# Patient Record
Sex: FEMALE | Race: BLACK OR AFRICAN AMERICAN | NOT HISPANIC OR LATINO | Employment: OTHER | URBAN - METROPOLITAN AREA
[De-identification: names, ages, dates, MRNs, and addresses within clinical notes are randomized per-mention and may not be internally consistent; named-entity substitution may affect disease eponyms.]

---

## 2017-09-06 ENCOUNTER — HOSPITAL ENCOUNTER (INPATIENT)
Facility: HOSPITAL | Age: 72
LOS: 12 days | Discharge: NURSING, REHAB OR LONG-TERM CARE | DRG: 097 | End: 2017-09-18
Attending: EMERGENCY MEDICINE | Admitting: INTERNAL MEDICINE
Payer: MEDICARE

## 2017-09-06 DIAGNOSIS — R93.0 ABNORMAL CT OF THE HEAD: ICD-10-CM

## 2017-09-06 DIAGNOSIS — I50.9 CONGESTIVE HEART FAILURE, UNSPECIFIED CONGESTIVE HEART FAILURE CHRONICITY, UNSPECIFIED CONGESTIVE HEART FAILURE TYPE: ICD-10-CM

## 2017-09-06 DIAGNOSIS — R41.82 ALTERED MENTAL STATUS, UNSPECIFIED ALTERED MENTAL STATUS TYPE: Primary | ICD-10-CM

## 2017-09-06 DIAGNOSIS — I50.9 CHF (CONGESTIVE HEART FAILURE): ICD-10-CM

## 2017-09-06 DIAGNOSIS — I10 HTN (HYPERTENSION), MALIGNANT: ICD-10-CM

## 2017-09-06 DIAGNOSIS — G93.40 ACUTE ENCEPHALOPATHY: ICD-10-CM

## 2017-09-06 DIAGNOSIS — N39.0 URINARY TRACT INFECTION WITHOUT HEMATURIA, SITE UNSPECIFIED: ICD-10-CM

## 2017-09-06 DIAGNOSIS — R56.9 SEIZURE: ICD-10-CM

## 2017-09-06 DIAGNOSIS — E11.8 TYPE 2 DIABETES MELLITUS WITH COMPLICATION: ICD-10-CM

## 2017-09-06 DIAGNOSIS — E11.8 TYPE 2 DIABETES MELLITUS WITH COMPLICATION, WITHOUT LONG-TERM CURRENT USE OF INSULIN: ICD-10-CM

## 2017-09-06 DIAGNOSIS — N39.0 URINARY TRACT INFECTION WITHOUT HEMATURIA: ICD-10-CM

## 2017-09-06 LAB
ALBUMIN SERPL BCP-MCNC: 3.4 G/DL
ALP SERPL-CCNC: 67 U/L
ALT SERPL W/O P-5'-P-CCNC: 18 U/L
ANION GAP SERPL CALC-SCNC: 10 MMOL/L
AST SERPL-CCNC: 32 U/L
BASOPHILS # BLD AUTO: 0.02 K/UL
BASOPHILS NFR BLD: 0.3 %
BILIRUB SERPL-MCNC: 0.8 MG/DL
BUN SERPL-MCNC: 14 MG/DL
CALCIUM SERPL-MCNC: 9.5 MG/DL
CHLORIDE SERPL-SCNC: 101 MMOL/L
CO2 SERPL-SCNC: 28 MMOL/L
CREAT SERPL-MCNC: 0.8 MG/DL
DIFFERENTIAL METHOD: NORMAL
EOSINOPHIL # BLD AUTO: 0 K/UL
EOSINOPHIL NFR BLD: 0.5 %
ERYTHROCYTE [DISTWIDTH] IN BLOOD BY AUTOMATED COUNT: 14.1 %
EST. GFR  (AFRICAN AMERICAN): >60 ML/MIN/1.73 M^2
EST. GFR  (NON AFRICAN AMERICAN): >60 ML/MIN/1.73 M^2
GLUCOSE SERPL-MCNC: 113 MG/DL
HCT VFR BLD AUTO: 38.5 %
HGB BLD-MCNC: 12.8 G/DL
INR PPP: 1.1
LYMPHOCYTES # BLD AUTO: 1.7 K/UL
LYMPHOCYTES NFR BLD: 23.9 %
MCH RBC QN AUTO: 28.8 PG
MCHC RBC AUTO-ENTMCNC: 33.2 G/DL
MCV RBC AUTO: 87 FL
MONOCYTES # BLD AUTO: 1 K/UL
MONOCYTES NFR BLD: 13 %
NEUTROPHILS # BLD AUTO: 4.5 K/UL
NEUTROPHILS NFR BLD: 62 %
PLATELET # BLD AUTO: 163 K/UL
PMV BLD AUTO: 10.6 FL
POTASSIUM SERPL-SCNC: 3.9 MMOL/L
PROT SERPL-MCNC: 7.9 G/DL
PROTHROMBIN TIME: 12 SEC
RBC # BLD AUTO: 4.45 M/UL
SODIUM SERPL-SCNC: 139 MMOL/L
TROPONIN I SERPL DL<=0.01 NG/ML-MCNC: 0.04 NG/ML
TSH SERPL DL<=0.005 MIU/L-ACNC: 2.34 UIU/ML
WBC # BLD AUTO: 7.29 K/UL

## 2017-09-06 PROCEDURE — 96368 THER/DIAG CONCURRENT INF: CPT | Mod: 59

## 2017-09-06 PROCEDURE — 96365 THER/PROPH/DIAG IV INF INIT: CPT

## 2017-09-06 PROCEDURE — 84443 ASSAY THYROID STIM HORMONE: CPT

## 2017-09-06 PROCEDURE — 96372 THER/PROPH/DIAG INJ SC/IM: CPT

## 2017-09-06 PROCEDURE — 80053 COMPREHEN METABOLIC PANEL: CPT

## 2017-09-06 PROCEDURE — 87040 BLOOD CULTURE FOR BACTERIA: CPT

## 2017-09-06 PROCEDURE — 63600175 PHARM REV CODE 636 W HCPCS: Performed by: STUDENT IN AN ORGANIZED HEALTH CARE EDUCATION/TRAINING PROGRAM

## 2017-09-06 PROCEDURE — 85610 PROTHROMBIN TIME: CPT

## 2017-09-06 PROCEDURE — 12000002 HC ACUTE/MED SURGE SEMI-PRIVATE ROOM

## 2017-09-06 PROCEDURE — 99285 EMERGENCY DEPT VISIT HI MDM: CPT | Mod: 25

## 2017-09-06 PROCEDURE — 63600175 PHARM REV CODE 636 W HCPCS: Performed by: INTERNAL MEDICINE

## 2017-09-06 PROCEDURE — 99285 EMERGENCY DEPT VISIT HI MDM: CPT | Mod: ,,, | Performed by: EMERGENCY MEDICINE

## 2017-09-06 PROCEDURE — 84484 ASSAY OF TROPONIN QUANT: CPT

## 2017-09-06 PROCEDURE — 99223 1ST HOSP IP/OBS HIGH 75: CPT | Mod: ,,, | Performed by: INTERNAL MEDICINE

## 2017-09-06 PROCEDURE — 85025 COMPLETE CBC W/AUTO DIFF WBC: CPT

## 2017-09-06 PROCEDURE — 93010 ELECTROCARDIOGRAM REPORT: CPT | Mod: ,,, | Performed by: INTERNAL MEDICINE

## 2017-09-06 RX ORDER — AMOXICILLIN 250 MG
1 CAPSULE ORAL 2 TIMES DAILY
Status: DISCONTINUED | OUTPATIENT
Start: 2017-09-06 | End: 2017-09-06

## 2017-09-06 RX ORDER — FUROSEMIDE 80 MG/1
80 TABLET ORAL 2 TIMES DAILY
Status: ON HOLD | COMMUNITY
End: 2017-09-25

## 2017-09-06 RX ORDER — ONDANSETRON 2 MG/ML
4 INJECTION INTRAMUSCULAR; INTRAVENOUS EVERY 6 HOURS PRN
Status: DISCONTINUED | OUTPATIENT
Start: 2017-09-07 | End: 2017-09-18 | Stop reason: HOSPADM

## 2017-09-06 RX ORDER — ALLOPURINOL 100 MG/1
100 TABLET ORAL 3 TIMES DAILY
COMMUNITY

## 2017-09-06 RX ORDER — IPRATROPIUM BROMIDE AND ALBUTEROL SULFATE 2.5; .5 MG/3ML; MG/3ML
3 SOLUTION RESPIRATORY (INHALATION) EVERY 4 HOURS PRN
Status: DISCONTINUED | OUTPATIENT
Start: 2017-09-06 | End: 2017-09-18 | Stop reason: HOSPADM

## 2017-09-06 RX ORDER — POLYETHYLENE GLYCOL 3350 17 G/17G
17 POWDER, FOR SOLUTION ORAL 2 TIMES DAILY PRN
Status: DISCONTINUED | OUTPATIENT
Start: 2017-09-06 | End: 2017-09-06

## 2017-09-06 RX ORDER — CARVEDILOL 3.12 MG/1
6.25 TABLET ORAL 2 TIMES DAILY
Status: DISCONTINUED | OUTPATIENT
Start: 2017-09-06 | End: 2017-09-07

## 2017-09-06 RX ORDER — ALLOPURINOL 100 MG/1
100 TABLET ORAL 3 TIMES DAILY
Status: DISCONTINUED | OUTPATIENT
Start: 2017-09-06 | End: 2017-09-06

## 2017-09-06 RX ORDER — ALBUTEROL SULFATE 90 UG/1
2 AEROSOL, METERED RESPIRATORY (INHALATION) EVERY 6 HOURS PRN
COMMUNITY

## 2017-09-06 RX ORDER — POTASSIUM CHLORIDE 750 MG/1
10 TABLET, EXTENDED RELEASE ORAL DAILY
COMMUNITY

## 2017-09-06 RX ORDER — OMEPRAZOLE 20 MG/1
20 CAPSULE, DELAYED RELEASE ORAL DAILY
COMMUNITY

## 2017-09-06 RX ORDER — BACLOFEN 10 MG/1
10 TABLET ORAL 3 TIMES DAILY
COMMUNITY

## 2017-09-06 RX ORDER — ONDANSETRON 8 MG/1
8 TABLET, ORALLY DISINTEGRATING ORAL EVERY 8 HOURS PRN
Status: DISCONTINUED | OUTPATIENT
Start: 2017-09-06 | End: 2017-09-06

## 2017-09-06 RX ORDER — ENOXAPARIN SODIUM 100 MG/ML
40 INJECTION SUBCUTANEOUS EVERY 24 HOURS
Status: DISCONTINUED | OUTPATIENT
Start: 2017-09-06 | End: 2017-09-18 | Stop reason: HOSPADM

## 2017-09-06 RX ORDER — OXYCODONE HYDROCHLORIDE 5 MG/1
5 TABLET ORAL EVERY 6 HOURS PRN
Status: DISCONTINUED | OUTPATIENT
Start: 2017-09-06 | End: 2017-09-06

## 2017-09-06 RX ORDER — MIDAZOLAM HYDROCHLORIDE 1 MG/ML
1 INJECTION INTRAMUSCULAR; INTRAVENOUS EVERY 4 HOURS PRN
Status: DISCONTINUED | OUTPATIENT
Start: 2017-09-07 | End: 2017-09-18 | Stop reason: HOSPADM

## 2017-09-06 RX ORDER — BACLOFEN 10 MG/1
10 TABLET ORAL 3 TIMES DAILY
Status: DISCONTINUED | OUTPATIENT
Start: 2017-09-06 | End: 2017-09-06

## 2017-09-06 RX ORDER — PANTOPRAZOLE SODIUM 40 MG/1
40 TABLET, DELAYED RELEASE ORAL DAILY
Status: DISCONTINUED | OUTPATIENT
Start: 2017-09-07 | End: 2017-09-06

## 2017-09-06 RX ORDER — LOSARTAN POTASSIUM 100 MG/1
100 TABLET ORAL DAILY
COMMUNITY

## 2017-09-06 RX ORDER — PROMETHAZINE HYDROCHLORIDE 12.5 MG/1
12.5 TABLET ORAL EVERY 6 HOURS PRN
Status: DISCONTINUED | OUTPATIENT
Start: 2017-09-06 | End: 2017-09-06

## 2017-09-06 RX ORDER — ACETAMINOPHEN 500 MG
500 TABLET ORAL EVERY 6 HOURS PRN
Status: DISCONTINUED | OUTPATIENT
Start: 2017-09-06 | End: 2017-09-06

## 2017-09-06 RX ORDER — LEVOTHYROXINE SODIUM 100 UG/1
100 TABLET ORAL DAILY
Status: ON HOLD | COMMUNITY
End: 2017-09-25

## 2017-09-06 RX ORDER — CARVEDILOL PHOSPHATE 20 MG/1
20 CAPSULE, EXTENDED RELEASE ORAL 2 TIMES DAILY
Status: ON HOLD | COMMUNITY
End: 2017-09-25 | Stop reason: HOSPADM

## 2017-09-06 RX ORDER — GLIPIZIDE 5 MG/1
5 TABLET, FILM COATED, EXTENDED RELEASE ORAL
COMMUNITY

## 2017-09-06 RX ORDER — FUROSEMIDE 40 MG/1
80 TABLET ORAL 2 TIMES DAILY
Status: DISCONTINUED | OUTPATIENT
Start: 2017-09-06 | End: 2017-09-06

## 2017-09-06 RX ORDER — LEVOTHYROXINE SODIUM 50 UG/1
100 TABLET ORAL DAILY
Status: DISCONTINUED | OUTPATIENT
Start: 2017-09-07 | End: 2017-09-06

## 2017-09-06 RX ORDER — POTASSIUM CHLORIDE 750 MG/1
10 CAPSULE, EXTENDED RELEASE ORAL DAILY
Status: DISCONTINUED | OUTPATIENT
Start: 2017-09-07 | End: 2017-09-18 | Stop reason: HOSPADM

## 2017-09-06 RX ORDER — LOSARTAN POTASSIUM 50 MG/1
100 TABLET ORAL DAILY
Status: DISCONTINUED | OUTPATIENT
Start: 2017-09-07 | End: 2017-09-06

## 2017-09-06 RX ADMIN — CEFTRIAXONE 1 G: 1 INJECTION, SOLUTION INTRAVENOUS at 07:09

## 2017-09-06 RX ADMIN — ENOXAPARIN SODIUM 40 MG: 100 INJECTION SUBCUTANEOUS at 10:09

## 2017-09-06 NOTE — ED TRIAGE NOTES
Pt presents from [facility] in [facility] with a diagnosis of altered mental status and new onset seizures. Pt was admitted on 9/4/17 with AMS and then developed seizure activity while hospitalized. Pt had approximately - seizures today. No seizures were witnessed during transport. Pt was also recently diagnosed with a UTI.

## 2017-09-06 NOTE — ED PROVIDER NOTES
Encounter Date: 9/6/2017    SCRIBE #1 NOTE: I, Sheila Saleh, am scribing for, and in the presence of,  Dr. Galeana. I have scribed the following portions of the note - the Resident attestation.       History     Chief Complaint   Patient presents with    Altered Mental Status     The patient is 70 y/o F with DM, HTN, GERD, gout, asthma, who was transferred to [facility] from [facility] with new-onset seizures while being hospitalized for AMS.  She presented to the [facility] on 9/4 with a new-onset AMS, in the work up that was done for her, a UTI and subacute R parietal ischemia were found. She was started on IV Rocephin and another brain CT and MRI were repeated, each time with poor quality 2/2 movement artifact. While in the hospital (this morning) she developed focal seizures x2, and was started on IV keppra and transferred to us based on family's demands. Lab results from [facility] did not show any electrolyte abnormality or leukocytosis. INR was 1.1.            Review of patient's allergies indicates:  No Known Allergies  Past Medical History:   Diagnosis Date    CVA (cerebral vascular accident)     Diabetes     GERD (gastroesophageal reflux disease)     Gout     Hypertension      No past surgical history on file.  No family history on file.  Social History   Substance Use Topics    Smoking status: Not on file    Smokeless tobacco: Not on file    Alcohol use Not on file     Review of Systems   Unable to perform ROS: Mental status change       Physical Exam     Initial Vitals   BP Pulse Resp Temp SpO2   -- -- -- -- --      MAP       --         Physical Exam    Vitals reviewed.  Constitutional: She appears lethargic. She is Obese . She is uncooperative. No distress.   HENT:   Head: Normocephalic and atraumatic.   Eyes: EOM are normal. Pupils are equal, round, and reactive to light.   Neck: Normal range of motion.   Cardiovascular: Normal rate, regular rhythm and normal  heart sounds.   No murmur heard.  Pulses:       Radial pulses are 2+ on the right side, and 2+ on the left side.        Dorsalis pedis pulses are 2+ on the right side, and 2+ on the left side.   Pulmonary/Chest: Breath sounds normal. No respiratory distress. She has no wheezes. She has no rales.   Respiratory and cardiovascular is not optimal 2/2 body habitus and uncooperativeness   Abdominal: Soft. There is no tenderness.   Musculoskeletal: She exhibits tenderness (on BL hips).   TTP on BL hips, pain on passive leg raise   Neurological: She appears lethargic. GCS eye subscore is 4. GCS verbal subscore is 3. GCS motor subscore is 6.   The patient is awake, not oriented to time, place, person, acknowledges my presence and questions, responds with incomprehensible words or by repeating yes. She only follows one-step commands. She is uncooperative with the exam, therefore sensation and strength is not assessable, but seems to have weakness in extremities, lower>upper extremities since she is not actively moving them.          ED Course   Procedures  Labs Reviewed   COMPREHENSIVE METABOLIC PANEL - Abnormal; Notable for the following:        Result Value    Glucose 113 (*)     Albumin 3.4 (*)     All other components within normal limits    Narrative:     ADD-ON PT-INR #[id number] PER DENILSON ACOSTA MD 17:43  17:43    09/06/2017 09/06/2017    TROPONIN I - Abnormal; Notable for the following:     Troponin I 0.043 (*)     All other components within normal limits    Narrative:     ADD-ON PT-INR #[id number] PER DENILSON ACOSTA MD 17:43  17:43    09/06/2017 09/06/2017    CULTURE, BLOOD   CULTURE, BLOOD   CBC W/ AUTO DIFFERENTIAL   TSH   PROTIME-INR   PROTIME-INR    Narrative:     ADD-ON PT-INR #[id number] PER DENILSON ACOSTA MD 17:43  17:43    09/06/2017 09/06/2017    URINALYSIS, REFLEX TO URINE CULTURE   TSH             Medical Decision Making:   History:   Old Medical Records: I decided to obtain old medical  records.  Initial Assessment:   The patient is 70 y/o F with DM, HTN, GERD, gout, asthma, who was transferred to [facility] from [facility] with new-onset focal seizures on a base of recent onset AMS probably 2/2 R parietal lobe ischemia and UTI. She has received IV keppra and Rocephin. On exam she is afebrile, lethargic with GCS of 12-13/15. Will obtain EKG, CXR, CBC, electrolytes, and a brain CT scan WO, and re-evaluate after receiving the results.  Clinical Tests:   Lab Tests: Ordered and Reviewed  Radiological Study: Ordered and Reviewed  Medical Tests: Ordered and Reviewed  Other:   I have discussed this case with another health care provider.       APC / Resident Notes:   6:00 PM  The patient is stable  First set of vital signs:  /62  HR 66  satting at 99%     6:26 PM  Troponin is 0.043  CBC and electrolytes are unremarkable  6:49 PM  CXR reveals cardiomegaly and increased interstitial markings suggestive of pulmonary edema     Eliz Sahni MD  PGY-1, Internal Medicine  09/06/2017             Scribe Attestation:   Scribe #1: I performed the above scribed service and the documentation accurately describes the services I performed. I attest to the accuracy of the note.    Attending Attestation:   Physician Attestation Statement for Resident:  As the supervising MD   Physician Attestation Statement: I have personally seen and examined this patient.   I agree with the above history. -: 5:49 PM  72 yo woman hx DM, HTN, asthma, and obesity presents as transfer from outside facility  for AMS. Previous presentation on 9/4 with AMS found to have UTI as well as possible stroke. CT head and MRI revealed subacute right parietal infract, limited secondary to motion artefact. Given rocephin for UTI. This morning had two sz started on keppra at that point. Family concerned over patient's care and recommend transfer to [facility]. Pt arrives to ED with paper charts and no family. Hx limited secondary to  patient's clinical condition.   As the supervising MD I agree with the above PE.   -: Pt is awake and alert, oriented x 0, follows simple commands, limited vocabulary. Abdomen: soft and non tender, bowel sounds present. Cardio: normal heart sounds. Lungs: appropriate respiratory effort, lung sounds diminished secondary to limited body habitus. Neuro: RUE weakness, BLE weakness Eyes: Pupils 3 mm bilaterally and reactive.   As the supervising MD I agree with the above treatment, course, plan, and disposition.   -: 70 yo woman with AMS which I believe is multi factorial, likely subacute stroke as well as UTI. Brief review of records from outside hospital recommend no acute emergent abnormalities. We will continue her treatment with Rocephin, obtain cultures and imaging, and admit to medicine.   I have reviewed and agree with the residents interpretation of the following: EKG, CT scans, x-rays and lab data.  I have reviewed the following: old records at this facility, EKG reports, x-ray reports and CT reports.          Physician Attestation for Scribe:  Physician Attestation Statement for Scribe #1: I, Dr. Galeana, reviewed documentation, as scribed by Sheila Saleh in my presence, and it is both accurate and complete.                 ED Course      Clinical Impression:   The primary encounter diagnosis was Altered mental status, unspecified altered mental status type. A diagnosis of Urinary tract infection without hematuria, site unspecified was also pertinent to this visit.                           Navjot Galeana MD  09/06/17 2028

## 2017-09-06 NOTE — ED NOTES
Patient unable to tell me her name or . Patient is very confused, but still able to follow simple commands. Report on this patient is that she was DX with a UTI and a CVA recently, and she was transferred for further evaluation. Patient in NAD.

## 2017-09-06 NOTE — ED NOTES
Patient is resting on stretcher with call bell within reach, bed locked in the low position, and side rails up x2 for safety. Patient is in NAD. RR spontaneous, even, and unlabored. Will continue to monitor.

## 2017-09-06 NOTE — ED NOTES
Appearance: Patient resting comfortably and in no acute distress.   Cardiac: Heart sounds audible and without abnormalities noted. Patient has a normal rate and regular rhythm.  Neuro/LOC: Patient disoriented x4. Unable to even state name/, location, or time.  Respiratory: Breath sounds audible, equal and clear bilaterally. Airway is open and patent, respirations are spontaneous, patient has a normal effort and rate, no accessory muscle use noted.  Skin: Intact, warm and dry. Color is consistent with ethnicity. Normal skin turgor and moist mucous membranes.  Gastro: Bowel sounds hypoactive, but audible x4 quadrants. No tenderness and no distention are present.  Musculoskeletal: Patient moving all extremities spontaneously    -Patient identifiers verified and correct for this patient.  -Patient resting with bedrails up x2 for safety, bed locked in low position, and call bell within reach.

## 2017-09-07 PROBLEM — E11.8 TYPE 2 DIABETES MELLITUS WITH COMPLICATION: Status: ACTIVE | Noted: 2017-09-07

## 2017-09-07 PROBLEM — G93.40 ACUTE ENCEPHALOPATHY: Status: ACTIVE | Noted: 2017-09-07

## 2017-09-07 PROBLEM — R93.0 ABNORMAL CT OF THE HEAD: Status: ACTIVE | Noted: 2017-09-07

## 2017-09-07 PROBLEM — R56.9 SEIZURE: Status: ACTIVE | Noted: 2017-09-07

## 2017-09-07 PROBLEM — I10 HTN (HYPERTENSION), MALIGNANT: Status: ACTIVE | Noted: 2017-09-07

## 2017-09-07 LAB
AMPHET+METHAMPHET UR QL: NEGATIVE
AORTIC VALVE REGURGITATION: ABNORMAL
BACTERIA #/AREA URNS AUTO: ABNORMAL /HPF
BARBITURATES UR QL SCN>200 NG/ML: NEGATIVE
BENZODIAZ UR QL SCN>200 NG/ML: NORMAL
BILIRUB UR QL STRIP: NEGATIVE
BZE UR QL SCN: NEGATIVE
CANNABINOIDS UR QL SCN: NEGATIVE
CLARITY UR REFRACT.AUTO: CLEAR
COLOR UR AUTO: YELLOW
CREAT UR-MCNC: 129 MG/DL
CRP SERPL-MCNC: 43.6 MG/L
ERYTHROCYTE [SEDIMENTATION RATE] IN BLOOD BY WESTERGREN METHOD: 39 MM/HR
ESTIMATED PA SYSTOLIC PRESSURE: 26.63
ETHANOL UR-MCNC: <10 MG/DL
GLUCOSE UR QL STRIP: NEGATIVE
HGB UR QL STRIP: ABNORMAL
KETONES UR QL STRIP: ABNORMAL
LACTATE SERPL-SCNC: 1.2 MMOL/L
LEUKOCYTE ESTERASE UR QL STRIP: ABNORMAL
METHADONE UR QL SCN>300 NG/ML: NEGATIVE
MICROSCOPIC COMMENT: ABNORMAL
NITRITE UR QL STRIP: NEGATIVE
OPIATES UR QL SCN: NEGATIVE
PCP UR QL SCN>25 NG/ML: NEGATIVE
PH UR STRIP: 5 [PH] (ref 5–8)
POCT GLUCOSE: 127 MG/DL (ref 70–110)
POCT GLUCOSE: 95 MG/DL (ref 70–110)
PROT UR QL STRIP: NEGATIVE
RBC #/AREA URNS AUTO: 1 /HPF (ref 0–4)
RETIRED EF AND QEF - SEE NOTES: 55 (ref 55–65)
SP GR UR STRIP: 1.01 (ref 1–1.03)
SQUAMOUS #/AREA URNS AUTO: 0 /HPF
TOXICOLOGY INFORMATION: NORMAL
URN SPEC COLLECT METH UR: ABNORMAL
UROBILINOGEN UR STRIP-ACNC: NEGATIVE EU/DL
VIT B12 SERPL-MCNC: >2000 PG/ML
WBC #/AREA URNS AUTO: 8 /HPF (ref 0–5)
YEAST UR QL AUTO: ABNORMAL

## 2017-09-07 PROCEDURE — 95813 EEG EXTND MNTR 61-119 MIN: CPT | Mod: 26,,, | Performed by: PSYCHIATRY & NEUROLOGY

## 2017-09-07 PROCEDURE — 25000003 PHARM REV CODE 250: Performed by: INTERNAL MEDICINE

## 2017-09-07 PROCEDURE — 96368 THER/DIAG CONCURRENT INF: CPT | Mod: 59

## 2017-09-07 PROCEDURE — 95813 EEG EXTND MNTR 61-119 MIN: CPT

## 2017-09-07 PROCEDURE — 25000003 PHARM REV CODE 250: Performed by: PHYSICIAN ASSISTANT

## 2017-09-07 PROCEDURE — 82607 VITAMIN B-12: CPT

## 2017-09-07 PROCEDURE — 93306 TTE W/DOPPLER COMPLETE: CPT

## 2017-09-07 PROCEDURE — 11000001 HC ACUTE MED/SURG PRIVATE ROOM

## 2017-09-07 PROCEDURE — 80307 DRUG TEST PRSMV CHEM ANLYZR: CPT

## 2017-09-07 PROCEDURE — 63600175 PHARM REV CODE 636 W HCPCS: Performed by: INTERNAL MEDICINE

## 2017-09-07 PROCEDURE — 84207 ASSAY OF VITAMIN B-6: CPT

## 2017-09-07 PROCEDURE — 99233 SBSQ HOSP IP/OBS HIGH 50: CPT | Mod: ,,, | Performed by: HOSPITALIST

## 2017-09-07 PROCEDURE — 93306 TTE W/DOPPLER COMPLETE: CPT | Mod: 26,,, | Performed by: INTERNAL MEDICINE

## 2017-09-07 PROCEDURE — 96375 TX/PRO/DX INJ NEW DRUG ADDON: CPT | Mod: 59

## 2017-09-07 PROCEDURE — 85651 RBC SED RATE NONAUTOMATED: CPT

## 2017-09-07 PROCEDURE — 96376 TX/PRO/DX INJ SAME DRUG ADON: CPT | Mod: 59

## 2017-09-07 PROCEDURE — 86255 FLUORESCENT ANTIBODY SCREEN: CPT | Mod: 59

## 2017-09-07 PROCEDURE — 92610 EVALUATE SWALLOWING FUNCTION: CPT

## 2017-09-07 PROCEDURE — 84425 ASSAY OF VITAMIN B-1: CPT

## 2017-09-07 PROCEDURE — 51702 INSERT TEMP BLADDER CATH: CPT

## 2017-09-07 PROCEDURE — 86140 C-REACTIVE PROTEIN: CPT

## 2017-09-07 PROCEDURE — 83605 ASSAY OF LACTIC ACID: CPT

## 2017-09-07 PROCEDURE — 81001 URINALYSIS AUTO W/SCOPE: CPT

## 2017-09-07 PROCEDURE — 96365 THER/PROPH/DIAG IV INF INIT: CPT

## 2017-09-07 PROCEDURE — 84484 ASSAY OF TROPONIN QUANT: CPT

## 2017-09-07 PROCEDURE — 99223 1ST HOSP IP/OBS HIGH 75: CPT | Mod: ,,, | Performed by: PSYCHIATRY & NEUROLOGY

## 2017-09-07 PROCEDURE — 63600175 PHARM REV CODE 636 W HCPCS: Performed by: PHYSICIAN ASSISTANT

## 2017-09-07 PROCEDURE — 93005 ELECTROCARDIOGRAM TRACING: CPT

## 2017-09-07 PROCEDURE — 82747 ASSAY OF FOLIC ACID RBC: CPT

## 2017-09-07 PROCEDURE — 36415 COLL VENOUS BLD VENIPUNCTURE: CPT

## 2017-09-07 RX ORDER — METOPROLOL TARTRATE 1 MG/ML
5 INJECTION, SOLUTION INTRAVENOUS EVERY 6 HOURS
Status: COMPLETED | OUTPATIENT
Start: 2017-09-07 | End: 2017-09-07

## 2017-09-07 RX ORDER — MIDAZOLAM HYDROCHLORIDE 1 MG/ML
1 INJECTION INTRAMUSCULAR; INTRAVENOUS ONCE
Status: DISCONTINUED | OUTPATIENT
Start: 2017-09-07 | End: 2017-09-18 | Stop reason: HOSPADM

## 2017-09-07 RX ORDER — MIDAZOLAM HYDROCHLORIDE 1 MG/ML
1 INJECTION INTRAMUSCULAR; INTRAVENOUS ONCE
Status: COMPLETED | OUTPATIENT
Start: 2017-09-07 | End: 2017-09-07

## 2017-09-07 RX ORDER — SODIUM CHLORIDE 450 MG/100ML
INJECTION, SOLUTION INTRAVENOUS CONTINUOUS
Status: DISCONTINUED | OUTPATIENT
Start: 2017-09-07 | End: 2017-09-08

## 2017-09-07 RX ORDER — LORAZEPAM 2 MG/ML
2 INJECTION INTRAMUSCULAR EVERY 4 HOURS PRN
Status: DISCONTINUED | OUTPATIENT
Start: 2017-09-07 | End: 2017-09-18 | Stop reason: HOSPADM

## 2017-09-07 RX ORDER — ASPIRIN 300 MG/1
300 SUPPOSITORY RECTAL DAILY
Status: DISCONTINUED | OUTPATIENT
Start: 2017-09-07 | End: 2017-09-09

## 2017-09-07 RX ORDER — GLUCAGON 1 MG
1 KIT INJECTION
Status: DISCONTINUED | OUTPATIENT
Start: 2017-09-07 | End: 2017-09-18 | Stop reason: HOSPADM

## 2017-09-07 RX ORDER — INSULIN ASPART 100 [IU]/ML
0-5 INJECTION, SOLUTION INTRAVENOUS; SUBCUTANEOUS EVERY 6 HOURS PRN
Status: DISCONTINUED | OUTPATIENT
Start: 2017-09-07 | End: 2017-09-18 | Stop reason: HOSPADM

## 2017-09-07 RX ORDER — LEVETIRACETAM 5 MG/ML
500 INJECTION INTRAVASCULAR EVERY 12 HOURS
Status: DISCONTINUED | OUTPATIENT
Start: 2017-09-07 | End: 2017-09-10

## 2017-09-07 RX ORDER — LEVOTHYROXINE SODIUM ANHYDROUS 100 UG/5ML
50 INJECTION, POWDER, LYOPHILIZED, FOR SOLUTION INTRAVENOUS DAILY
Status: DISCONTINUED | OUTPATIENT
Start: 2017-09-07 | End: 2017-09-09

## 2017-09-07 RX ADMIN — ACYCLOVIR SODIUM 1500 MG: 50 INJECTION, SOLUTION INTRAVENOUS at 02:09

## 2017-09-07 RX ADMIN — ACYCLOVIR SODIUM 1500 MG: 50 INJECTION, SOLUTION INTRAVENOUS at 09:09

## 2017-09-07 RX ADMIN — CEFTRIAXONE 2 G: 2 INJECTION, SOLUTION INTRAVENOUS at 12:09

## 2017-09-07 RX ADMIN — ASPIRIN 300 MG: 300 SUPPOSITORY RECTAL at 08:09

## 2017-09-07 RX ADMIN — MIDAZOLAM HYDROCHLORIDE 1 MG: 1 INJECTION, SOLUTION INTRAMUSCULAR; INTRAVENOUS at 01:09

## 2017-09-07 RX ADMIN — CEFTRIAXONE 2 G: 2 INJECTION, SOLUTION INTRAVENOUS at 06:09

## 2017-09-07 RX ADMIN — METOPROLOL TARTRATE 5 MG: 5 INJECTION INTRAVENOUS at 06:09

## 2017-09-07 RX ADMIN — LEVETIRACETAM 500 MG: 5 INJECTION INTRAVENOUS at 02:09

## 2017-09-07 RX ADMIN — ENOXAPARIN SODIUM 40 MG: 100 INJECTION SUBCUTANEOUS at 05:09

## 2017-09-07 RX ADMIN — LEVOTHYROXINE SODIUM ANHYDROUS 50 MCG: 100 INJECTION, POWDER, LYOPHILIZED, FOR SOLUTION INTRAVENOUS at 08:09

## 2017-09-07 RX ADMIN — POTASSIUM CHLORIDE 10 MEQ: 750 CAPSULE, EXTENDED RELEASE ORAL at 08:09

## 2017-09-07 RX ADMIN — METOPROLOL TARTRATE 5 MG: 5 INJECTION INTRAVENOUS at 05:09

## 2017-09-07 RX ADMIN — METOPROLOL TARTRATE 5 MG: 5 INJECTION INTRAVENOUS at 12:09

## 2017-09-07 RX ADMIN — LEVETIRACETAM 500 MG: 5 INJECTION INTRAVENOUS at 08:09

## 2017-09-07 RX ADMIN — LEVETIRACETAM 500 MG: 5 INJECTION INTRAVENOUS at 09:09

## 2017-09-07 RX ADMIN — ACYCLOVIR SODIUM 1500 MG: 50 INJECTION, SOLUTION INTRAVENOUS at 08:09

## 2017-09-07 RX ADMIN — MIDAZOLAM HYDROCHLORIDE 1 MG: 1 INJECTION, SOLUTION INTRAMUSCULAR; INTRAVENOUS at 12:09

## 2017-09-07 RX ADMIN — SODIUM CHLORIDE: 0.45 INJECTION, SOLUTION INTRAVENOUS at 03:09

## 2017-09-07 NOTE — PT/OT/SLP EVAL
"Speech Language Pathology  Evaluation  Clinical Evaluation of Swallow      Blue Cassidy   MRN: [id number]   Admitting Diagnosis: AMS    Diet recommendations: Solid Diet Level: Dental Soft  Liquid Diet Level: Thin Feed only when awake/alert, HOB to 90 degrees and Meds whole 1 at a time    SLP Treatment Date: 09/07/17  Speech Start Time: 0947     Speech Stop Time: 1001     Speech Total (min): 14 min       TREATMENT BILLABLE MINUTES:  Eval Swallow and Oral Function 14    Diagnosis: AMS      Past Medical History:   Diagnosis Date    CHF (congestive heart failure)     COPD (chronic obstructive pulmonary disease)     CVA (cerebral vascular accident)     Diabetes     GERD (gastroesophageal reflux disease)     Gout     Hypertension     Thyroid disease     UTI (urinary tract infection)      Past Surgical History:   Procedure Laterality Date    HYSTERECTOMY      JOINT REPLACEMENT      right knee    FL REMOVAL GALLBLADDER      Cholecystectomy    THYROID SURGERY         Has the patient been evaluated by SLP for swallowing? : Yes  Keep patient NPO?: No   General Precautions: Standard, aspiration, fall, NPO    Current Respiratory Status: nasal cannula     Social Hx: Patient lives with her daughter.    Prior diet: regular.    Occupational/hobbies/homemaking: none stated.    Communicated w/ RN prior to and following evaluation.    Subjective:  "Come on."  Pt stated when asked if she was agreeable to po trials  Patient goals: to eat and drink.    Pain/Comfort  Pain Rating 1: 0/10  Pain Rating Post-Intervention 1: 0/10    Objective:   Patient found with: peripheral IV, holley catheter, oxygen    Oral Musculature Evaluation  Oral Musculature: unable to assess due to poor participation/comprehension  Dentition: edentulous  Mucosal Quality: adequate  Lingual Strength and Mobility: WFL  Buccal Strength and Mobility: WFL  Volitional Cough: adequate  Volitional Swallow: timely  Voice Prior to PO Intake: clear     Bedside " Swallow Eval:  Consistencies Assessed: Thin liquids single ice chips x2, tsp sips x2, straw sips x4, Puree tsp bites x3 and Solids self fed bites x3 - half of a cracker  Oral Phase: anterior loss, decreased closure around utensil and excess chewing due to pt missing teeth  Pharyngeal Phase: WFL, no overt clinical  signs/symptoms of aspiration and no overt clinical signs/symptoms of pharyngeal dysphagia    Additional Treatment:  Education was provided to pt and family re: SLP role, eval results, diet recs, s/s of aspiration, risk of aspiration, aspiration precautions, and SLP POC.  They indicated good understanding of the information provided.  Pt's whiteboard was updated w/ aspiration precautions.    FIM:                                 Assessment:  Blue Cassidy is a 71 y.o. female with a medical diagnosis of  AMS and presents with a functional oropharyngeal swallow.  She would benefit from ongoing Skilled Speech services for monitoring of diet tolerance and swallow stability.    Do you have any cultural, spiritual, Druze conflicts, given your current situation?: none     Discharge recommendations: Discharge Facility/Level Of Care Needs: other (see comments) (pending pt/ot recs)     Goals:    SLP Goals        Problem: SLP Goal    Goal Priority Disciplines Outcome   SLP Goal     SLP Ongoing (interventions implemented as appropriate)   Description:  Speech Language Pathology Goals  Goals expected to be met by 9/14    1.  Pt will tolerate Dental Soft diet w/thin liquids w/ no overt s/s of aspiration.                         Plan:   Patient to be seen Therapy Frequency: 5 x/week   Plan of Care expires: 10/06/17  Plan of Care reviewed with: patient, family  SLP Follow-up?: Yes              Zoila Santo CCC-SLP  09/07/2017

## 2017-09-07 NOTE — PROGRESS NOTES
"[facility] Hospital Medicine  Progress Note    Patient Name: Blue Cassidy  MRN: [id number]  Patient Class: IP- Inpatient   Admission Date: 9/6/2017  Length of Stay: 1 days  Attending Physician: Lien Alba MD  Primary Care Provider: Primary [facility] Medicine Team: [facility] HOSP MED B Lien Alba MD    Subjective:     Principal Problem:Acute encephalopathy    HPI:  Ms. Cassidy is a 72yo lady with a past medical history of   She now comes as a transfer from [facility] after being admitted there on 9/4/17 with acute altered mental status with agitation.  She had a CT head done there at admission which revealed, "right parietal lobe ischemia."  She was also found to have a, "slightly abnormal UA" and was started on Rocephin 1g iv q24 hours.  She was admitted to the IM service and Neurology was consulted, who recommended an MRI brain.  This was done, but was of such a poor quality that is was un-interpretable.  The night after she was admitted she, "had two focal seizures.  The patient was placed on IV Keppra and an EEG was obtained this morning."  She also had to be given 2mg of iv haldol to control some combativeness.  Since receiving all of these therapies, she has been lethargic and unable to take anything by mouth.       Hospital Course:  9/7 - pt more alert, still not back to baseline mental status accord to family,  Neurology saw pt this am,  2 am, labs collected,  EEG ordered    Interval History: woke up this am and talking, but still confused    Review of Systems   Constitutional: Positive for fatigue. Negative for chills, diaphoresis and fever.   HENT: Positive for trouble swallowing. Negative for congestion and sinus pain.    Respiratory: Negative for cough and shortness of breath.    Cardiovascular: Negative for chest pain and leg swelling.   Gastrointestinal: Negative for abdominal distention, abdominal pain, blood in stool, constipation, " diarrhea and vomiting.   Genitourinary: Negative for difficulty urinating.   Musculoskeletal: Negative for back pain, gait problem, joint swelling, myalgias, neck pain and neck stiffness.   Skin: Negative for rash.   Neurological: Negative for tremors, weakness, numbness and headaches.   Psychiatric/Behavioral: Positive for agitation, behavioral problems and confusion.     Objective:     Vital Signs (Most Recent):  Temp: 98.2 °F (36.8 °C) (09/07/17 1211)  Pulse: 62 (09/07/17 1211)  Resp: 18 (09/07/17 1211)  BP: (!) 153/81 (09/07/17 1211)  SpO2: (!) 92 % (09/07/17 1211) Vital Signs (24h Range):  Temp:  [97.4 °F (36.3 °C)-98.6 °F (37 °C)] 98.2 °F (36.8 °C)  Pulse:  [62-73] 62  Resp:  [16-22] 18  SpO2:  [92 %-100 %] 92 %  BP: (119-191)/(65-96) 153/81     Weight: (!) 143.2 kg (315 lb 11.2 oz)  Body mass index is 54.19 kg/m².    Intake/Output Summary (Last 24 hours) at 09/07/17 1307  Last data filed at 09/07/17 0602   Gross per 24 hour   Intake              150 ml   Output             3050 ml   Net            -2900 ml      Physical Exam   Constitutional: She is oriented to person, place, and time. She appears well-developed and well-nourished. She appears lethargic.   obese   HENT:   Head: Normocephalic and atraumatic.   Mouth/Throat: Oropharynx is clear and moist.   Eyes: Conjunctivae and EOM are normal. Pupils are equal, round, and reactive to light. No scleral icterus.   Neck: Normal range of motion. Neck supple. No thyromegaly present.   Cardiovascular: Normal rate, regular rhythm and normal heart sounds.    Pulmonary/Chest: Effort normal and breath sounds normal. No respiratory distress. She has no wheezes. She has no rales.   Abdominal: Soft. Bowel sounds are normal. She exhibits no distension and no mass. There is no tenderness. There is no rebound and no guarding. No hernia.   Musculoskeletal: Normal range of motion. She exhibits edema and deformity.   Lymphadenopathy:     She has no cervical adenopathy.    Neurological: She is oriented to person, place, and time. She has normal strength. She appears lethargic. GCS eye subscore is 4. GCS verbal subscore is 4. GCS motor subscore is 5.   Psychiatric: She has a normal mood and affect. Her behavior is normal. Her speech is delayed. Cognition and memory are impaired. She exhibits abnormal recent memory and abnormal remote memory. She is inattentive.       Significant Labs:   CBC:   Recent Labs  Lab 09/06/17  1724   WBC 7.29   HGB 12.8   HCT 38.5        CMP:   Recent Labs  Lab 09/06/17  1724      K 3.9      CO2 28   *   BUN 14   CREATININE 0.8   CALCIUM 9.5   PROT 7.9   ALBUMIN 3.4*   BILITOT 0.8   ALKPHOS 67   AST 32   ALT 18   ANIONGAP 10   EGFRNONAA >60.0       Significant Imaging: I have reviewed and interpreted all pertinent imaging results/findings within the past 24 hours.    Assessment/Plan:      * Acute encephalopathy    AMS: (Rapid change within a week)   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) - little improvement per daughters after the start of Acyclovir   - ?? Seizure   - Paraneoplastic process   - Other infection (UTI)  - on empiric acyclovir     On IVFs for hydration, npo     - Fall precaution   - Check B12, MMA, NH3, B1, B6, HIV, RPR, ESR, CRP, TPO, ROXANN  - collected  - Urine drug screen - ordered  - EEG  - ordered  - Autoimmune encephalitis panel in serum - ordered  - MRI brain with and without contrast   - LP w/ CSF analysis:   · Cell count/dif, protein, glucose, ACE    · Infectious panel: arbovirus, CMV, HBV, EBV, HSV, HHV-6, HIV, AMMON Virus(PML), [place] equine, Varicella zoster, West Nile, VDRL CSF, crypto, CJD 14-3-3    · Culture: bacterial/fungal cx  · Paraneoplastic panel   ·   Patient need to be sedated for MRI and LP.   Would need anaesthesia help considering patient need sedation and difficult LP due to body habitus     Abnormal CT head with chronic microvascular ischemic changes  -1.2 cm area  of hypodensity in the right parietal lobe without obvious cytotoxic edema, given irregular margins and confinement to white matter.    -Findings most likely represent components of chronic small vessel ischemic disease.  -Nonspecific hypodensity in the right cerebellar hemisphere.  MRI of the brain without and with contrast obtained for further evaluation        Seizure    New onset  keppra   eeg  Neurology consulted          Type 2 diabetes mellitus with complication    Recent Labs      09/07/17   1214   POCTGLUCOSE  127*     On SS          HTN (hypertension), malignant    -Start iv lopressor 5mg iv q6 hours  -Telemetry  -2D Echo with cfd    9/7 - 153/81        Abnormal CT of the head    Focal finding on the right          Urinary tract infection without hematuria    Rocephin started          Altered mental status    Suspect seizure with viral encephalitis  keppra started              VTE Risk Mitigation         Ordered     enoxaparin injection 40 mg  Daily     Route:  Subcutaneous        09/06/17 1954     Medium Risk of VTE  Once      09/06/17 1954     Place sequential compression device  Until discontinued      09/06/17 1954     Place ROSITA hose  Until discontinued      09/06/17 1954              Lien Alba MD  Department of Hospital Medicine   [facility]

## 2017-09-07 NOTE — PROGRESS NOTES
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 Seizure   - Paraneoplastic process   - Other infection (UTI)     Plan:   - Fall precaution   - Check B12, MMA, NH3, B1, B6, HIV, RPR, ESR, CRP  - Urine drug screen  - EEG   - Autoimmune encephalitis panel in serum   - MRI brain with and without contrast   - LP w/ CSF analysis:   · Cell count/dif, protein, glucose, ACE    · Infectious panel: arbovirus, CMV, HBV, EBV, HSV, HHV-6, HIV, AMMON Virus(PML), [place] equine, Varicella zoster, West Nile, VDRL CSF, crypto, CJD 14-3-3    · Culture: bacterial/fungal cx  · Paraneoplastic panel   - Patient need to be sedated for MRI and LP. Would need anaesthesia help considering patient need sedation and difficult LP due to body habitus   - Case discussed with Dr Maurice  - Will follow          Elliott Rose MD  Neurology Resident   [facility] [address] [place] [address]  Pager: [phone]

## 2017-09-07 NOTE — H&P
"[facility] Medicine  History & Physical    Patient Name: Blue Cassidy  MRN: [id number]  Admission Date: 9/6/2017  Attending Physician: Lien Alba MD  Primary Care Provider: No primary care provider on file.    Hospital Medicine [place] [facility] [place] [facility] [place] MARY ANN Larios MD     Patient information was obtained from relative(s), past medical records and ER records.     Subjective:     Principal Problem:    Chief Complaint:   Chief Complaint   Patient presents with    Altered Mental Status        HPI: Ms. Cassidy is a 70yo lady with a past medical history of   She now comes as a transfer from [facility] after being admitted there on 9/4/17 with acute altered mental status with agitation.  She had a CT head done there at admission which revealed, "right parietal lobe ischemia."  She was also found to have a, "slightly abnormal UA" and was started on Rocephin 1g iv q24 hours.  She was admitted to the IM service and Neurology was consulted, who recommended an MRI brain.  This was done, but was of such a poor quality that is was un-interpretable.  The night after she was admitted she, "had two focal seizures.  The patient was placed on IV Keppra and an EEG was obtained this morning."  She also had to be given 2mg of iv haldol to control some combativeness.  Since receiving all of these therapies, she has been lethargic and unable to take anything by mouth.    Past Medical History:   Diagnosis Date    CVA (cerebral vascular accident)     Diabetes     GERD (gastroesophageal reflux disease)     Gout     Hypertension        No past surgical history on file.    Review of patient's allergies indicates:  No Known Allergies    Home medications:  Coreg 25mg po bid  Ventolin HFA 90mcg inhaler 2 puffs q4 hours prn  KCl 20meq po qday  Omeprazole 20mg po qday  Losartan 100mg po qday  Levothyroxine 100mcg po qday  Glipizide 5mg po qday  Lasix 80mg po qday  Baclofen 10mg po "   Allopurinol 100mg po TID    Hospital Medications  Albuterol nebs q4 prn  Lispro (Humalog) 8U TID  Haldol 2mg IV q4 hours prn   Lovenox 40mg sq qday  Rocephin 1g iv qday  B12 1000mcg IM qday  Keppra 500mg iv bid      Family History     None        Social History Main Topics    Smoking status: Not on file    Smokeless tobacco: Not on file    Alcohol use Not on file    Drug use: Unknown    Sexual activity: Not on file     Review of Systems     Objective:     Vital Signs (Most Recent):  Temp: 98.2 °F (36.8 °C) (09/06/17 1632)  Pulse: 68 (09/07/17 0011)  Resp: 16 (09/07/17 0011)  BP: (!) 185/90 (09/07/17 0011)  SpO2: 97 % (09/07/17 0011) Vital Signs (24h Range):  Temp:  [98.2 °F (36.8 °C)] 98.2 °F (36.8 °C)  Pulse:  [64-68] 68  Resp:  [16-22] 16  SpO2:  [97 %-100 %] 97 %  BP: (146-191)/(65-96) 185/90     Weight: (!) 150 kg (330 lb 11 oz)  There is no height or weight on file to calculate BMI.    Physical Exam   Constitutional: She appears well-developed and well-nourished. She appears lethargic. No distress.   HENT:   Head: Normocephalic and atraumatic.   Mouth/Throat: No oropharyngeal exudate.   Eyes: Conjunctivae and EOM are normal. Pupils are equal, round, and reactive to light. Right eye exhibits no discharge. Left eye exhibits no discharge. No scleral icterus.   Neck: Normal range of motion. Neck supple. No JVD present. No tracheal deviation present. No thyromegaly present.   Cardiovascular: Normal rate, regular rhythm, normal heart sounds and intact distal pulses.  Exam reveals no gallop and no friction rub.    No murmur heard.  Pulmonary/Chest: Accessory muscle usage present. No stridor. No tachypnea and no bradypnea. No respiratory distress. She has no decreased breath sounds. She has no wheezes. She has rhonchi. She has no rales. She exhibits no tenderness.   Abdominal: Soft. Bowel sounds are normal. She exhibits distension. She exhibits no mass. There is tenderness in the periumbilical area. There is no  rigidity, no rebound and no guarding.   Genitourinary:   Genitourinary Comments: Jones in place   Musculoskeletal: Normal range of motion. She exhibits no edema or tenderness.   Lymphadenopathy:     She has no cervical adenopathy.   No peripheral edema   Neurological: She appears lethargic. She is disoriented. She displays normal reflexes. No cranial nerve deficit. She exhibits normal muscle tone. Coordination abnormal. She displays no Babinski's sign on the right side. She displays no Babinski's sign on the left side.   Withdraws to pain in all extremities   Skin: Skin is warm and dry. No rash noted. She is not diaphoretic. No erythema. No pallor.   Dry, crinkling of the skin of the lower legs   Psychiatric: Her affect is inappropriate. She is agitated, slowed and withdrawn. She is not actively hallucinating. Cognition and memory are impaired. She is noncommunicative. She exhibits abnormal recent memory and abnormal remote memory. She is inattentive.   Nursing note and vitals reviewed.      Significant Labs:   Recent Results (from the past 24 hour(s))   CBC auto differential    Collection Time: 09/06/17  5:24 PM   Result Value Ref Range    WBC 7.29 3.90 - 12.70 K/uL    RBC 4.45 4.00 - 5.40 M/uL    Hemoglobin 12.8 12.0 - 16.0 g/dL    Hematocrit 38.5 37.0 - 48.5 %    MCV 87 82 - 98 fL    MCH 28.8 27.0 - 31.0 pg    MCHC 33.2 32.0 - 36.0 g/dL    RDW 14.1 11.5 - 14.5 %    Platelets 163 150 - 350 K/uL    MPV 10.6 9.2 - 12.9 fL    Gran # 4.5 1.8 - 7.7 K/uL    Lymph # 1.7 1.0 - 4.8 K/uL    Mono # 1.0 0.3 - 1.0 K/uL    Eos # 0.0 0.0 - 0.5 K/uL    Baso # 0.02 0.00 - 0.20 K/uL    Gran% 62.0 38.0 - 73.0 %    Lymph% 23.9 18.0 - 48.0 %    Mono% 13.0 4.0 - 15.0 %    Eosinophil% 0.5 0.0 - 8.0 %    Basophil% 0.3 0.0 - 1.9 %    Differential Method Automated    Comprehensive metabolic panel    Collection Time: 09/06/17  5:24 PM   Result Value Ref Range    Sodium 139 136 - 145 mmol/L    Potassium 3.9 3.5 - 5.1 mmol/L    Chloride 101  95 - 110 mmol/L    CO2 28 23 - 29 mmol/L    Glucose 113 (H) 70 - 110 mg/dL    BUN, Bld 14 8 - 23 mg/dL    Creatinine 0.8 0.5 - 1.4 mg/dL    Calcium 9.5 8.7 - 10.5 mg/dL    Total Protein 7.9 6.0 - 8.4 g/dL    Albumin 3.4 (L) 3.5 - 5.2 g/dL    Total Bilirubin 0.8 0.1 - 1.0 mg/dL    Alkaline Phosphatase 67 55 - 135 U/L    AST 32 10 - 40 U/L    ALT 18 10 - 44 U/L    Anion Gap 10 8 - 16 mmol/L    eGFR if African American >60.0 >60 mL/min/1.73 m^2    eGFR if non African American >60.0 >60 mL/min/1.73 m^2   Troponin I    Collection Time: 09/06/17  5:24 PM   Result Value Ref Range    Troponin I 0.043 (H) 0.000 - 0.026 ng/mL   Protime-INR    Collection Time: 09/06/17  5:24 PM   Result Value Ref Range    Prothrombin Time 12.0 9.0 - 12.5 sec    INR 1.1 0.8 - 1.2   TSH    Collection Time: 09/06/17  5:24 PM   Result Value Ref Range    TSH 2.344 0.400 - 4.000 uIU/mL         Significant Imaging:   X-Ray Chest AP Portable 09/06/2017 Chest Pain          RESULTS:  Chest AP portable     Indication:Chest pain     Comparison:None     Findings:  The cardiomediastinal silhouette is enlarged, noting calcification of the aortic arch.  There is no pleural effusion.  The trachea is midline.  The lungs are symmetrically expanded bilaterally with patchy increased interstitial and parenchymal attenuation primarily in a perihilar distribution, suggesting edema. No large focal consolidation seen.  There is no pneumothorax.  The osseous structures are remarkable for degenerative changes of the spine and shoulders.  IMPRESSION:      Cardiomegaly with edema, correlation advised.           Electronically signed by: ANNE MARIE BRADSHAW MD  Date:                                            09/06/17  Time:                                           18:34           Signed By:  Anne Marie Bradshaw MD on 9/6/2017  6:34 PM         CT Head Without Contrast 09/06/2017 AMS and CVA + new onset seizure          RESULTS:  CT head without contrast     CLINICAL INDICATION:  AMS and CVA + new onset seizure     TECHNIQUE: Axial CT images obtained throughout the region of the head without the use of intravenous contrast. Axial, sagittal and coronal reconstructions were performed.     COMPARISON: No available priors     FINDINGS:  The ventricles are normal in size without evidence of hydrocephalus.  There are no extra-axial fluid collections.  There is no evidence of intracranial hemorrhage.  There is no evidence of mass effect.     There is periventricular white matter hypoattenuation with one additional area of white matter hypoattenuation most seen on series 1 image 21, measuring 1.2 cm. No apparent cytotoxic edema as the lesion appears confined to the white matter, with an irregular margin.   Questionable area of hypoattenuation in the right cerebellum best seen on series 1 image 9.      The cranium appears intact. Incidental note of hyperostosis frontalis. Mastoid air cells and paranasal sinuses are essentially clear.  The orbits and intraorbital contents are within normal limits.  IMPRESSION:      1.2 cm area of hypodensity in the right parietal lobe without obvious cytotoxic edema, given irregular margins and confinement to white matter.  Findings most likely represent components of chronic small vessel ischemic disease.     Nonspecific hypodensity in the right cerebellar hemisphere.  MRI of the brain without and with contrast obtained for further evaluation.     No evidence of intracranial hemorrhage or territorial infarction.  ______________________________________      Electronically signed by resident: TREVOR CONN  Date:                                            09/06/17  Time:                                           18:48                 As the supervising and teaching physician, I personally reviewed the images and resident's interpretation and I agree with the findings.              Electronically signed by: KEILA ALONSO MD  Date:                                             09/06/17  Time:                                           18:57           Signed By:  Kyler Acosta MD on 9/6/2017  6:57 PM                   Assessment/Plan:     Acute encephalopathy  -Unsure of etiology, though this does not seem consistent with UTI  -Given her CT head with multiple hypodensities, this is worrisome for subacute CVA  -?HSV encephalitis ?HTN encephalopathy ?Paraneoplastic limbic encephalitis ?other metabolic encephalopathy  -Check B12, NH3, B1, B6, HIV, RPR, ESR, CRP  -Urine drug screen  -If MRI with contrast and MRA are unrevealing for stroke, consider LP   -No fever, meningismus, etc., to suggest CNS infection   -Given parietal lobe changes would start IV acyclovir empirically (10mg/kg q8 hours) until MRI back along with Rocephin 2g iv q12 hours  -NPO due to failing swallow study bedside (she is too agitated for NGT)  -If MRI reveals CVA, consult neurovascular stroke  -ASA 300mg NV qday     New onset seizure  -EEG  -Neuro checks q4 hours, telemetry  -She is able to transiently focus and mumble few correct answers, so do not suspect status  -MRI/MRA brain  -IV Keppra 500mg q12 hours  -Seizure precautions  -Check lactic acid    Abnormal CT head with chronic microvascular ischemic changes  -1.2 cm area of hypodensity in the right parietal lobe without obvious cytotoxic edema, given irregular margins and confinement to white matter.    -Findings most likely represent components of chronic small vessel ischemic disease.  -Nonspecific hypodensity in the right cerebellar hemisphere.  MRI of the brain without and with contrast obtained for further evaluation  -Will give Versed 1mg iv for MRI brain stat    Diabetes mellitus 2  -SSI protocol for NPO patients    Malignant essential hypertension with heart failure  -Start iv lopressor 5mg iv q6 hours  -Telemetry  -2D Echo with cfd    Subacute UTI  -Check UA and cultures here  -Rocephin    VTE Risk Mitigation         Ordered     enoxaparin injection 40 mg   Daily     Route:  Subcutaneous        09/06/17 1954     Medium Risk of VTE  Once      09/06/17 1954     Place sequential compression device  Until discontinued      09/06/17 1954     Place ROSITA hose  Until discontinued      09/06/17 1954            MARY ANN Larios MD  Department of [facility] Medicine   [facility]

## 2017-09-07 NOTE — HPI
Ms. Cassidy is a 71 year old lady with a past medical history of HTN, DM, [facility], COPD and thyroid disease transferred to [facility] main [facility] for the evaluation of AMS. She was in her usual state of health this Sunday when she was ready to drive out and while sitting in the car she fell and her neighbor reported that she was awake after she fell but was confused and was not responding appropriately. Her daughter that lives with her came within 30 minutes and reported that she was talking out of her head and she took her to the ED in [place]. She was diagnosed with UTI and discharged on antibiotics. Next day her condition get worse. Daughter reported that she was talking out of her dead addressing her son who passed away a while ago and also note slurring of speech. She was also grabbing stuff that was not there. By now she become very weak and unable to ambulate at all which she was doing until day before. Family took her to the hospital where she was diagnosed with possible stroke with worsening of her condition and family decided her to transfer to [facility]. She was started on Acyclovir and ceftriaxone. This morning per family she is little better. When I saw her she was sleeping but I was able to woke her up. She recognized her one daughter out of three in the room. She followed simple commands after taking little time. Most of her speech this morning was hard to understand for me and her daughters.

## 2017-09-07 NOTE — PLAN OF CARE
Problem: Patient Care Overview  Goal: Plan of Care Review  Outcome: Ongoing (interventions implemented as appropriate)  Plan of care discussed with patient and family. No distress or pain observed. Cont on IV fluid and antibiotic therapy tolerating well.  Oriented to room. VSS, call light in reach, bed low, side rails up x2. Bed alarm on for fall precautions. Will cont to monitor.

## 2017-09-07 NOTE — HOSPITAL COURSE
Presented to [facility] 9/4 with acute change in mental status and agitation. Transferred to [facility] on 9/7 and started on empiric acyclovir 9/7 for suspected HSV encephalitis that has since resulted negative. LP on 9/8-CSF with 2 WBC, 133 RBC, 270 protein, 73 glucose. MRI Brain 9/8 with chronic microvascular ischemic changes and small area of cystic encephalomalacia in the deep right frontal lobe c/w remote lacunar infarct.     9/11-Daughter reports mental status continues to fluctuate, but per team her mental status has improved since admission.   9/12- Has not received any Seroquel yet, awaiting 24 hr vEEG, HSV in CSF resulted negative  9/7 - pt more alert, still not back to baseline mental status accord to family,  Neurology saw pt this am,  2 am, labs collected,  EEG ordered  9/8 - spoke w anesthesia fellow yesterday,  Place another consutlt for anesthesia today for LP and MRI under sedation,  Spoke to neurology fellow and he is going to set up procedures.  9/9- up in chair, verbal and interacting with caretakers, eating a ookie by herself.  LP + protein, 270, MRI - consistent w chronic microvasc disease, and old small stroke w encephalomalasia right fromtal lobe and cerebro volume loss.   K 3.2  9/10 - developed acute urinary retention, holley placed, discussed care of kishan Hall and Malgorzata, her family present.  Work excuse given to Malgorzata Whalen.  Pt still confused, still waitng on HSV and other lab. See palliative care note below  9/11 - family at bedside reports pt did get agitated but they were able to hansal it.  Will add seroquel prn    HSV was negative, no seizure activity noted. Will transfer to SNF for both chronic and acute debility of unceratin etiology.

## 2017-09-07 NOTE — PROGRESS NOTES
Patient admitted for altered mental status, seizures, acute encephalopathy. Concerns for CVA. Pt is not a candidate for DMHFP.    Removed from hf list.

## 2017-09-07 NOTE — PLAN OF CARE
09/07/17 1030   Discharge Assessment   Assessment Type Discharge Planning Assessment   Confirmed/corrected address and phone number on facesheet? Yes   Assessment information obtained from? Caregiver   Expected Length of Stay (days) 3   Communicated expected length of stay with patient/caregiver yes   Prior to hospitilization cognitive status: Alert/Oriented   Prior to hospitalization functional status: Independent   Current cognitive status: Not Oriented to Person;Not Oriented to Place;Not Oriented to Time   Current Functional Status: Needs Assistance   Facility Arrived From: [facility]   Lives With other relative(s)  (neice)   Able to Return to Prior Arrangements unable to determine at this time (comments)   Is patient able to care for self after discharge? Unable to determine at this time (comments)   Patient's perception of discharge disposition home or selfcare   Readmission Within The Last 30 Days no previous admission in last 30 days   Patient currently being followed by outpatient case management? No   Patient currently receives any other outside agency services? No   Equipment Currently Used at Home none   Do you have any problems affording any of your prescribed medications? No   Is the patient taking medications as prescribed? yes   Does the patient have transportation home? Yes   Transportation Available car;family or friend will provide   Discharge Plan A Home with family   Discharge Plan B Home Health   Patient/Family In Agreement With Plan yes   Met with multiple family members at bedside and explained role of . Will follow for d/c needs.

## 2017-09-07 NOTE — SUBJECTIVE & OBJECTIVE
Past Medical History:   Diagnosis Date    CHF (congestive heart failure)     COPD (chronic obstructive pulmonary disease)     CVA (cerebral vascular accident)     Diabetes     GERD (gastroesophageal reflux disease)     Gout     Hypertension     Thyroid disease     UTI (urinary tract infection)        Past Surgical History:   Procedure Laterality Date    HYSTERECTOMY      JOINT REPLACEMENT      right knee    AK REMOVAL GALLBLADDER      Cholecystectomy    THYROID SURGERY         Review of patient's allergies indicates:  No Known Allergies    Current Neurological Medications:     No current facility-administered medications on file prior to encounter.      No current outpatient prescriptions on file prior to encounter.     Family History     None        Social History Main Topics    Smoking status: Former Smoker     Years: 15.00     Types: Cigarettes    Smokeless tobacco: Former User      Comment: greater than 18 years    Alcohol use No    Drug use: No    Sexual activity: Not Currently     Review of Systems   Unable to perform ROS: Mental status change     Objective:     Vital Signs (Most Recent):  Temp: 97.4 °F (36.3 °C) (09/07/17 0746)  Pulse: 64 (09/07/17 0746)  Resp: 18 (09/07/17 0746)  BP: (!) 143/67 (09/07/17 0746)  SpO2: 99 % (09/07/17 0746) Vital Signs (24h Range):  Temp:  [97.4 °F (36.3 °C)-98.6 °F (37 °C)] 97.4 °F (36.3 °C)  Pulse:  [64-73] 64  Resp:  [16-22] 18  SpO2:  [97 %-100 %] 99 %  BP: (119-191)/(65-96) 143/67     Weight: (!) 143.2 kg (315 lb 11.2 oz)  Body mass index is 54.19 kg/m².    Physical Exam   Constitutional: She appears well-developed and well-nourished.   HENT:   Head: Normocephalic and atraumatic.   Right Ear: External ear normal.   Left Ear: External ear normal.   Eyes: Conjunctivae are normal.   Right eye droopy    Neck: Neck supple.   Pulmonary/Chest: Effort normal and breath sounds normal.   Abdominal: Soft. Bowel sounds are normal.   Musculoskeletal: She exhibits  edema and tenderness.   Neurological: She has a normal Finger-Nose-Finger Test.   Reflex Scores:       Tricep reflexes are 1+ on the right side and 1+ on the left side.       Bicep reflexes are 1+ on the right side and 1+ on the left side.       Brachioradialis reflexes are 1+ on the right side and 1+ on the left side.       Patellar reflexes are 1+ on the right side and 1+ on the left side.       Achilles reflexes are 1+ on the right side and 1+ on the left side.  Skin: Skin is warm.   Psychiatric: Her speech is slurred.       NEUROLOGICAL EXAMINATION:     MENTAL STATUS   Oriented to person.   Disoriented to place.   Disoriented to time.   Attention: decreased. Concentration: decreased.   Speech: slurred   Level of consciousness: drowsy  Knowledge: poor.   Unable to name object. Unable to read. Unable to repeat.        Only oriented to her one daughter. Follow simple commands like stick your tongue out, raised your arms and legs. Not oriented to time and place.      CRANIAL NERVES        Pupil equal and reactive, tracking. No cranial nerve abnormalities noted on my exam. Except right eye little ptosis.      MOTOR EXAM   Muscle bulk: normal  Overall muscle tone: normal       Moving all her extremities spontaneously. Upper more than lower extremities. Unablr to check exact strength.       REFLEXES     Reflexes   Right brachioradialis: 1+  Left brachioradialis: 1+  Right biceps: 1+  Left biceps: 1+  Right triceps: 1+  Left triceps: 1+  Right patellar: 1+  Left patellar: 1+  Right achilles: 1+  Left achilles: 1+  Right : 1+  Left : 1+  Right plantar: equivocal  Left plantar: equivocal  Right ankle clonus: absent  Left ankle clonus: absent    SENSORY EXAM        Responded to pain. Unable to check other modalities.      GAIT AND COORDINATION      Coordination   Finger to nose coordination: normal       Gait not checked. AMS unable to ambulate for last one week.        Significant Labs:   CBC:   Recent Labs  Lab  09/06/17  1724   WBC 7.29   HGB 12.8   HCT 38.5        CMP:   Recent Labs  Lab 09/06/17  1724   *      K 3.9      CO2 28   BUN 14   CREATININE 0.8   CALCIUM 9.5   PROT 7.9   ALBUMIN 3.4*   BILITOT 0.8   ALKPHOS 67   AST 32   ALT 18   ANIONGAP 10   EGFRNONAA >60.0     Urine Culture: No results for input(s): LABURIN in the last 48 hours.    Significant Imaging:     CT Brain:  1.2 cm area of hypodensity in the right parietal lobe without obvious cytotoxic edema, given irregular margins and confinement to white matter.  Findings most likely represent components of chronic small vessel ischemic disease.  Nonspecific hypodensity in the right cerebellar hemisphere.  MRI of the brain without and with contrast obtained for further evaluation.  No evidence of intracranial hemorrhage or territorial infarction.    MRI Brain without contrast:  No evidence of acute infarction.  Markedly limited examination.

## 2017-09-07 NOTE — ASSESSMENT & PLAN NOTE
AMS: (Rapid change within a week)   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) - little improvement per daughters after the start of Acyclovir   - ?? Seizure   - Paraneoplastic process   - Other infection (UTI)  - on empiric acyclovir     On IVFs for hydration, npo     - Fall precaution   - Check B12, MMA, NH3, B1, B6, HIV, RPR, ESR, CRP, TPO, ROXANN  - collected  - Urine drug screen - ordered  - EEG  - ordered  - Autoimmune encephalitis panel in serum - ordered  - MRI brain with and without contrast   - LP w/ CSF analysis:   · Cell count/dif, protein, glucose, ACE    · Infectious panel: arbovirus, CMV, HBV, EBV, HSV, HHV-6, HIV, AMMON Virus(PML), [place] equine, Varicella zoster, West Nile, VDRL CSF, crypto, CJD 14-3-3    · Culture: bacterial/fungal cx  · Paraneoplastic panel   ·   Patient need to be sedated for MRI and LP.   Would need anaesthesia help considering patient need sedation and difficult LP due to body habitus     Abnormal CT head with chronic microvascular ischemic changes  -1.2 cm area of hypodensity in the right parietal lobe without obvious cytotoxic edema, given irregular margins and confinement to white matter.    -Findings most likely represent components of chronic small vessel ischemic disease.  -Nonspecific hypodensity in the right cerebellar hemisphere.  MRI of the brain without and with contrast obtained for further evaluation

## 2017-09-07 NOTE — PHYSICIAN QUERY
PT Name: Blue Cassidy  MR #: [id number]     Physician Query Form - Medication-Correlation for Diagnosis      CDS/: Garrison Ruff               Contact information: Ex [phone]     This form is a permanent document in the medical record.     Query Date: September 7, 2017      By submitting this query, we are merely seeking further clarification of documentation.  Please utilize your independent clinical judgment when addressing the question(s) below.    The medical record contains the following:  The patient has an order for the following medication(s):  Levothyroxine 50 mcg QD           Please provider the corresponding diagnosis or diagnoses that support(s) the use of the medication(s)   Physician Use Only          hypothyroidism

## 2017-09-07 NOTE — PT/OT/SLP PROGRESS
Physical Therapy      Blue Cassidy  MRN: [id number]    Patient not seen today secondary to off floor at echo. Will follow-up at next possible time.    Brayan Presley, PT

## 2017-09-07 NOTE — PROCEDURES
DATE OF SERVICE:  09/07/2017    ELECTROENCEPHALOGRAM REPORT EXTENDED RECORDING    METHODOLOGY:  Electroencephalographic (EEG) is recorded with electrodes placed   according to the International 10-20 placement system.  Thirty two (32) channels   of digital signal (sampling rate of 512/sec), including T1 and T2, were   simultaneously recorded from the scalp and may include EKG, EMG, and/or eye   monitors.  Recording band pass was 0.1 to 512 Hz.  Digital video recording of   the patient is simultaneously recorded with the EEG.  The patient is instructed   to report clinical symptoms which may occur during the recording session.  EEG   and video recording are stored and archived in digital format.  Activation   procedures, which include photic stimulation, hyperventilation and instructing   patients to perform simple tasks, are done in selected patients.    The EEG is displayed on a monitor screen and can be reviewed using different   montages.  Computer-assisted analysis is employed to detect spike and   electrographic seizure activity.   The entire record is submitted for computer   analysis.  The entire recording is visually reviewed, and the times identified   by computer analysis as being spikes or seizures are reviewed again.    Compressed spectral analysis (CSA) is also performed on the activity recorded   from each individual channel.  This is displayed as a power display of   frequencies from 0 to 30 Hz over time.   The CSA is reviewed looking for   asymmetries in power between homologous areas of the scalp, then compared with   the original EEG recording.    Nextiva software was also utilized in the review of this study.  This software   suite analyzes the EEG recording in multiple domains.  Coherence and rhythmicity   are computed to identify EEG sections which may contain organized seizures.    Each channel undergoes analysis to detect the presence of spike and sharp waves   which have special and  morphological characteristics of epileptic activity.  The   routine EEG recording is converted from special into frequency domain.  This is   then displayed comparing homologous areas to identify areas of significant   asymmetry.  Algorithm to identify non-cortically generated artifact is used to   separate artifact from the EEG.    RECORDING TIMES:  Duration is 2 hours and 7 minutes.    EEG FINDINGS:  The background of this study initially contained relatively low   voltage in the background with superimposed beta activity at 20 to 25 Hz being   the most evident frequency.  Clinically, the patient appears to be drowsy at   this point, lying in bed with a 2-liter nasal cannula.  Over the next few   minutes, the patient becomes a bit more aroused and movement artifact begins to   be seen frontally.  A few faster frequencies are then seen on the EEG.  The   patient answers questions incorrectly, but does interact with the examiner.  A   few scattered alpha frequencies are seen upon a slow delta background with   little variability throughout the record.  The patient then becomes drowsy again   midway through the record and central beta and possibly poorly formed sleep   spindle activity is then seen for several pages.    At the end of the record, the patient arouses slightly with a return to a mixed   beta frequency background upon a low amplitude suppressed delta background.    There was little variability throughout this record, even little variability   between waking and sleep states.  The entire record was fairly low voltage with   a low amount of cerebral activity appreciated.  There were no asymmetries, focal   findings, epileptiform discharges, or electrographic seizures.  There was an   overall disorganized and suppressed element to this study suggesting an   encephalopathy.    INTERPRETATION:  Abnormal EEG due to diffuse suppression and disorganization of   the background, suggesting a moderate  encephalopathy.  There were no focal   features and no evidence of seizure activity.  There did appear to be some   relatively normal sleep captured in the middle of the study as well.      NBB/HN  dd: 09/07/2017 15:49:47 (CDT)  td: 09/07/2017 18:55:27 (CDT)  Doc ID   #[id number]  Job ID #[id number]    CC:

## 2017-09-07 NOTE — CONSULTS
[facility]  Neurology  Consult Note    Patient Name: Blue Cassiyd  MRN: [id number]  Admission Date: 9/6/2017  Hospital Length of Stay: 1 days  Code Status: Full Code   Attending Provider: Lien Alba MD   Consulting Provider: Rose Morrison II, MD  Primary Care Physician: Primary Doctor No  Principal Problem:<principal problem not specified>    Inpatient consult to Neurology  Consult performed by: ROSE MORRISON II  Consult ordered by: CATHLEEN FLOWERS  Reason for consult: AMS        Subjective:     Chief Complaint:  AMS     HPI:   Ms. Cassidy is a 71 year old lady with a past medical history of HTN, DM, CCF, COPD and thyroid disease transferred to [facility] main [facility] for the evaluation of AMS. She was in her usual state of health this Sunday when she was ready to drive out and while sitting in the car she fell and her neighbor reported that she was awake after she fell but was confused and was not responding appropriately. Her daughter that lives with her came within 30 minutes and reported that she was talking out of her head and she took her to the ED in [place]. She was diagnosed with UTI and discharged on antibiotics. Next day her condition get worse. Daughter reported that she was talking out of her dead addressing her son who passed away a while ago and also note slurring of speech. She was also grabbing stuff that was not there. By now she become very weak and unable to ambulate at all which she was doing until day before. Family took her to the hospital where she was diagnosed with possible stroke with worsening of her condition and family decided her to transfer to [facility]. She was started on Acyclovir and ceftriaxone. This morning per family she is little better. When I saw her she was sleeping but I was able to woke her up. She recognized her one daughter out of three in the room. She followed simple commands after taking little time. Most of her speech this morning was  hard to understand for me and her daughters.              Past Medical History:   Diagnosis Date    CHF (congestive heart failure)     COPD (chronic obstructive pulmonary disease)     CVA (cerebral vascular accident)     Diabetes     GERD (gastroesophageal reflux disease)     Gout     Hypertension     Thyroid disease     UTI (urinary tract infection)        Past Surgical History:   Procedure Laterality Date    HYSTERECTOMY      JOINT REPLACEMENT      right knee    [place] REMOVAL GALLBLADDER      Cholecystectomy    THYROID SURGERY         Review of patient's allergies indicates:  No Known Allergies    Current Neurological Medications:     No current facility-administered medications on file prior to encounter.      No current outpatient prescriptions on file prior to encounter.     Family History     None        Social History Main Topics    Smoking status: Former Smoker     Years: 15.00     Types: Cigarettes    Smokeless tobacco: Former User      Comment: greater than 18 years    Alcohol use No    Drug use: No    Sexual activity: Not Currently     Review of Systems   Unable to perform ROS: Mental status change     Objective:     Vital Signs (Most Recent):  Temp: 97.4 °F (36.3 °C) (09/07/17 0746)  Pulse: 64 (09/07/17 0746)  Resp: 18 (09/07/17 0746)  BP: (!) 143/67 (09/07/17 0746)  SpO2: 99 % (09/07/17 0746) Vital Signs (24h Range):  Temp:  [97.4 °F (36.3 °C)-98.6 °F (37 °C)] 97.4 °F (36.3 °C)  Pulse:  [64-73] 64  Resp:  [16-22] 18  SpO2:  [97 %-100 %] 99 %  BP: (119-191)/(65-96) 143/67     Weight: (!) 143.2 kg (315 lb 11.2 oz)  Body mass index is 54.19 kg/m².    Physical Exam   Constitutional: She appears well-developed and well-nourished.   HENT:   Head: Normocephalic and atraumatic.   Right Ear: External ear normal.   Left Ear: External ear normal.   Eyes: Conjunctivae are normal.   Right eye droopy    Neck: Neck supple.   Pulmonary/Chest: Effort normal and breath sounds normal.   Abdominal: Soft.  Bowel sounds are normal.   Musculoskeletal: She exhibits edema and tenderness.   Neurological: She has a normal Finger-Nose-Finger Test.   Reflex Scores:       Tricep reflexes are 1+ on the right side and 1+ on the left side.       Bicep reflexes are 1+ on the right side and 1+ on the left side.       Brachioradialis reflexes are 1+ on the right side and 1+ on the left side.       Patellar reflexes are 1+ on the right side and 1+ on the left side.       Achilles reflexes are 1+ on the right side and 1+ on the left side.  Skin: Skin is warm.   Psychiatric: Her speech is slurred.       NEUROLOGICAL EXAMINATION:     MENTAL STATUS   Oriented to person.   Disoriented to place.   Disoriented to time.   Attention: decreased. Concentration: decreased.   Speech: slurred   Level of consciousness: drowsy  Knowledge: poor.   Unable to name object. Unable to read. Unable to repeat.        Only oriented to her one daughter. Follow simple commands like stick your tongue out, raised your arms and legs. Not oriented to time and place.      CRANIAL NERVES        Pupil equal and reactive, tracking. No cranial nerve abnormalities noted on my exam. Except right eye little ptosis.      MOTOR EXAM   Muscle bulk: normal  Overall muscle tone: normal       Moving all her extremities spontaneously. Upper more than lower extremities. Unablr to check exact strength.       REFLEXES     Reflexes   Right brachioradialis: 1+  Left brachioradialis: 1+  Right biceps: 1+  Left biceps: 1+  Right triceps: 1+  Left triceps: 1+  Right patellar: 1+  Left patellar: 1+  Right achilles: 1+  Left achilles: 1+  Right : 1+  Left : 1+  Right plantar: equivocal  Left plantar: equivocal  Right ankle clonus: absent  Left ankle clonus: absent    SENSORY EXAM        Responded to pain. Unable to check other modalities.      GAIT AND COORDINATION      Coordination   Finger to nose coordination: normal       Gait not checked. AMS unable to ambulate for last one  week.        Significant Labs:   CBC:   Recent Labs  Lab 09/06/17  1724   WBC 7.29   HGB 12.8   HCT 38.5        CMP:   Recent Labs  Lab 09/06/17  1724   *      K 3.9      CO2 28   BUN 14   CREATININE 0.8   CALCIUM 9.5   PROT 7.9   ALBUMIN 3.4*   BILITOT 0.8   ALKPHOS 67   AST 32   ALT 18   ANIONGAP 10   EGFRNONAA >60.0     Urine Culture: No results for input(s): LABURIN in the last 48 hours.    Significant Imaging:     CT Brain:  1.2 cm area of hypodensity in the right parietal lobe without obvious cytotoxic edema, given irregular margins and confinement to white matter.  Findings most likely represent components of chronic small vessel ischemic disease.  Nonspecific hypodensity in the right cerebellar hemisphere.  MRI of the brain without and with contrast obtained for further evaluation.  No evidence of intracranial hemorrhage or territorial infarction.    MRI Brain without contrast:  No evidence of acute infarction.  Markedly limited examination.         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 Seizure   - Paraneoplastic process   - Other infection (UTI)     Plan:   - Fall precaution   - Check B12, MMA, NH3, B1, B6, HIV, RPR, ESR, CRP, TPO, ROXANN   - Urine drug screen  - EEG   - Hold Keppra   - Autoimmune encephalitis panel in serum   - MRI brain with and without contrast   - LP w/ CSF analysis:   · Cell count/dif, protein, glucose, ACE    · Infectious panel: arbovirus, CMV, HBV, EBV, HSV, HHV-6, HIV, AMMON Virus(PML), [place] equine, Varicella zoster, West Nile, VDRL CSF, crypto, CJD 14-3-3    · Culture: bacterial/fungal cx  · Paraneoplastic panel   - Patient need to be sedated for MRI and LP. Would need anaesthesia help considering patient need sedation and difficult LP due to body habitus   -  Case discussed with Dr Maurice  - Will follow              VTE Risk Mitigation         Ordered     enoxaparin injection 40 mg  Daily     Route:  Subcutaneous        09/06/17 1954     Medium Risk of VTE  Once      09/06/17 1954     Place sequential compression device  Until discontinued      09/06/17 1954     Place ROSITA hose  Until discontinued      09/06/17 1954        Thank you for your consult. I will follow-up with patient. Please contact us if you have any additional questions.    Elliott Rose MD  Neurology Resident   [facility] [address] [place] [address]  Pager: [phone]

## 2017-09-07 NOTE — HPI
"Ms. Cassidy is a 70yo lady with a past medical history of   She now comes as a transfer from [facility] after being admitted there on 9/4/17 with acute altered mental status with agitation.  She had a CT head done there at admission which revealed, "right parietal lobe ischemia."  She was also found to have a, "slightly abnormal UA" and was started on Rocephin 1g iv q24 hours.  She was admitted to the IM service and Neurology was consulted, who recommended an MRI brain.  This was done, but was of such a poor quality that is was un-interpretable.  The night after she was admitted she, "had two focal seizures.  The patient was placed on IV Keppra and an EEG was obtained this morning."  She also had to be given 2mg of iv haldol to control some combativeness.  Since receiving all of these therapies, she has been lethargic and unable to take anything by mouth.     "

## 2017-09-07 NOTE — ED NOTES
IM MD notified that patient has failed dysphagia screen and refused all oral medications. MD verbalized understanding stated he will come down to see the patient.

## 2017-09-07 NOTE — ASSESSMENT & PLAN NOTE
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 Seizure   - Paraneoplastic process   - Other infection (UTI)     Plan:   - Fall precaution   - Check B12, MMA, NH3, B1, B6, HIV, RPR, ESR, CRP  - Urine drug screen  - EEG   - Autoimmune encephalitis panel in serum   - MRI brain with and without contrast   - LP w/ CSF analysis:   · Cell count/dif, protein, glucose, ACE    · Infectious panel: arbovirus, CMV, HBV, EBV, HSV, HHV-6, HIV, AMMON Virus(PML), [place] equine, Varicella zoster, West Nile, VDRL CSF, crypto, CJD 14-3-3    · Culture: bacterial/fungal cx  · Paraneoplastic panel   - Patient need to be sedated for MRI and LP. Would need anaesthesia help considering patient need sedation and difficult LP due to body habitus   - Case discussed with Dr Maurice  - Will follow

## 2017-09-07 NOTE — PHYSICIAN QUERY
PT Name: Blue Cassidy  MR #: [id number]     Physician Query Form - Documentation Clarification      CDS/: Garrison Ruff               Contact information: Ex [phone]    This form is a permanent document in the medical record.     Query Date: September 7, 2017    By submitting this query, we are merely seeking further clarification of documentation. Please utilize your independent clinical judgment when addressing the question(s) below.    The Medical record reflects the following:    Supporting Clinical Findings Location in Medical Record     Malignant essential hypertension with heart failure   -Start iv lopressor 5mg iv q6 hours   -Telemetry   -2D Echo with cfd            H/P 9/6                                                                                      Doctor, Please specify diagnosis or diagnoses associated with above clinical findings.    Provider Use Only    1.  (     )  Hypertensive Emergency    2.  (      )  Hypertensive Urgency    3.  (  x   )  Other    I feel uncomfortable billing for this, because BPs do not consistant with admitting doctors dx.                                                                                                                       [  ] Clinically undetermined

## 2017-09-07 NOTE — PT/OT/SLP PROGRESS
Occupational Therapy      Blue Cassidy  MRN: [id number]    Patient not seen today secondary to Other (Comment) (Patient having brain scan). Will follow-up 9/8/17.    Elizabeth Gilliam OT  9/7/2017

## 2017-09-07 NOTE — SUBJECTIVE & OBJECTIVE
Interval History: woke up this am and talking, but still confused    Review of Systems   Constitutional: Positive for fatigue. Negative for chills, diaphoresis and fever.   HENT: Positive for trouble swallowing. Negative for congestion and sinus pain.    Respiratory: Negative for cough and shortness of breath.    Cardiovascular: Negative for chest pain and leg swelling.   Gastrointestinal: Negative for abdominal distention, abdominal pain, blood in stool, constipation, diarrhea and vomiting.   Genitourinary: Negative for difficulty urinating.   Musculoskeletal: Negative for back pain, gait problem, joint swelling, myalgias, neck pain and neck stiffness.   Skin: Negative for rash.   Neurological: Negative for tremors, weakness, numbness and headaches.   Psychiatric/Behavioral: Positive for agitation, behavioral problems and confusion.     Objective:     Vital Signs (Most Recent):  Temp: 98.2 °F (36.8 °C) (09/07/17 1211)  Pulse: 62 (09/07/17 1211)  Resp: 18 (09/07/17 1211)  BP: (!) 153/81 (09/07/17 1211)  SpO2: (!) 92 % (09/07/17 1211) Vital Signs (24h Range):  Temp:  [97.4 °F (36.3 °C)-98.6 °F (37 °C)] 98.2 °F (36.8 °C)  Pulse:  [62-73] 62  Resp:  [16-22] 18  SpO2:  [92 %-100 %] 92 %  BP: (119-191)/(65-96) 153/81     Weight: (!) 143.2 kg (315 lb 11.2 oz)  Body mass index is 54.19 kg/m².    Intake/Output Summary (Last 24 hours) at 09/07/17 1307  Last data filed at 09/07/17 0602   Gross per 24 hour   Intake              150 ml   Output             3050 ml   Net            -2900 ml      Physical Exam   Constitutional: She is oriented to person, place, and time. She appears well-developed and well-nourished. She appears lethargic.   obese   HENT:   Head: Normocephalic and atraumatic.   Mouth/Throat: Oropharynx is clear and moist.   Eyes: Conjunctivae and EOM are normal. Pupils are equal, round, and reactive to light. No scleral icterus.   Neck: Normal range of motion. Neck supple. No thyromegaly present.    Cardiovascular: Normal rate, regular rhythm and normal heart sounds.    Pulmonary/Chest: Effort normal and breath sounds normal. No respiratory distress. She has no wheezes. She has no rales.   Abdominal: Soft. Bowel sounds are normal. She exhibits no distension and no mass. There is no tenderness. There is no rebound and no guarding. No hernia.   Musculoskeletal: Normal range of motion. She exhibits edema and deformity.   Lymphadenopathy:     She has no cervical adenopathy.   Neurological: She is oriented to person, place, and time. She has normal strength. She appears lethargic. GCS eye subscore is 4. GCS verbal subscore is 4. GCS motor subscore is 5.   Psychiatric: She has a normal mood and affect. Her behavior is normal. Her speech is delayed. Cognition and memory are impaired. She exhibits abnormal recent memory and abnormal remote memory. She is inattentive.       Significant Labs:   CBC:   Recent Labs  Lab 09/06/17  1724   WBC 7.29   HGB 12.8   HCT 38.5        CMP:   Recent Labs  Lab 09/06/17  1724      K 3.9      CO2 28   *   BUN 14   CREATININE 0.8   CALCIUM 9.5   PROT 7.9   ALBUMIN 3.4*   BILITOT 0.8   ALKPHOS 67   AST 32   ALT 18   ANIONGAP 10   EGFRNONAA >60.0       Significant Imaging: I have reviewed and interpreted all pertinent imaging results/findings within the past 24 hours.

## 2017-09-07 NOTE — PLAN OF CARE
Problem: SLP Goal  Goal: SLP Goal  Speech Language Pathology Goals  Goals expected to be met by 9/14    1.  Pt will tolerate Dental Soft diet w/thin liquids w/ no overt s/s of aspiration.      Outcome: Ongoing (interventions implemented as appropriate)  Clinical Swallow Evaluation completed.  REC:  Pt resume po intake w/ Dental Soft diet w/ thin liquids, oral meds whole 1 at a time, aspiration precautions, supervision w/ meals.  Results reviewed w/ RN.  Will review w/ team.  Cont ST per POC.    Zoila Santo CCC-SLP  9/7/2017

## 2017-09-08 ENCOUNTER — SURGERY (OUTPATIENT)
Age: 72
End: 2017-09-08

## 2017-09-08 ENCOUNTER — ANESTHESIA (OUTPATIENT)
Dept: ENDOSCOPY | Facility: HOSPITAL | Age: 72
DRG: 097 | End: 2017-09-08
Payer: MEDICARE

## 2017-09-08 ENCOUNTER — ANESTHESIA EVENT (OUTPATIENT)
Dept: ENDOSCOPY | Facility: HOSPITAL | Age: 72
DRG: 097 | End: 2017-09-08
Payer: MEDICARE

## 2017-09-08 PROBLEM — I50.9 CHF (CONGESTIVE HEART FAILURE): Status: ACTIVE | Noted: 2017-09-08

## 2017-09-08 PROBLEM — E87.6 HYPOKALEMIA: Status: ACTIVE | Noted: 2017-09-08

## 2017-09-08 LAB
ANION GAP SERPL CALC-SCNC: 10 MMOL/L
BASOPHILS # BLD AUTO: 0.02 K/UL
BASOPHILS NFR BLD: 0.3 %
BUN SERPL-MCNC: 16 MG/DL
CALCIUM SERPL-MCNC: 8.8 MG/DL
CHLORIDE SERPL-SCNC: 102 MMOL/L
CLARITY CSF: CLEAR
CO2 SERPL-SCNC: 28 MMOL/L
COLOR CSF: COLORLESS
CREAT SERPL-MCNC: 1.2 MG/DL
DIFFERENTIAL METHOD: ABNORMAL
EOSINOPHIL # BLD AUTO: 0.1 K/UL
EOSINOPHIL NFR BLD: 2.2 %
ERYTHROCYTE [DISTWIDTH] IN BLOOD BY AUTOMATED COUNT: 13.8 %
EST. GFR  (AFRICAN AMERICAN): 51.8 ML/MIN/1.73 M^2
EST. GFR  (NON AFRICAN AMERICAN): 44.9 ML/MIN/1.73 M^2
FOLATE RBC-MCNC: 430 NG/ML
GLUCOSE CSF-MCNC: 73 MG/DL
GLUCOSE SERPL-MCNC: 131 MG/DL
HCT VFR BLD AUTO: 38.1 %
HGB BLD-MCNC: 12.3 G/DL
INDIA INK PREP CSF: NORMAL
LYMPHOCYTES # BLD AUTO: 0.8 K/UL
LYMPHOCYTES NFR BLD: 12.2 %
LYMPHOCYTES NFR CSF MANUAL: 86 %
MAGNESIUM SERPL-MCNC: 1.7 MG/DL
MCH RBC QN AUTO: 28.7 PG
MCHC RBC AUTO-ENTMCNC: 32.3 G/DL
MCV RBC AUTO: 89 FL
MONOCYTES # BLD AUTO: 1.2 K/UL
MONOCYTES NFR BLD: 18.8 %
MONOS+MACROS NFR CSF MANUAL: 14 %
NEUTROPHILS # BLD AUTO: 4.3 K/UL
NEUTROPHILS NFR BLD: 66.3 %
PHOSPHATE SERPL-MCNC: 3.1 MG/DL
PLATELET # BLD AUTO: 151 K/UL
PMV BLD AUTO: 10.7 FL
POCT GLUCOSE: 103 MG/DL (ref 70–110)
POCT GLUCOSE: 120 MG/DL (ref 70–110)
POCT GLUCOSE: 213 MG/DL (ref 70–110)
POCT GLUCOSE: 235 MG/DL (ref 70–110)
POTASSIUM SERPL-SCNC: 3.2 MMOL/L
PROT CSF-MCNC: 270 MG/DL
RBC # BLD AUTO: 4.29 M/UL
RBC # CSF: 133 /CU MM
SODIUM SERPL-SCNC: 140 MMOL/L
SPECIMEN VOL CSF: 3.8 ML
TROPONIN I SERPL DL<=0.01 NG/ML-MCNC: 0.03 NG/ML
WBC # BLD AUTO: 6.48 K/UL
WBC # CSF: 2 /CU MM

## 2017-09-08 PROCEDURE — 87205 SMEAR GRAM STAIN: CPT

## 2017-09-08 PROCEDURE — 63600175 PHARM REV CODE 636 W HCPCS: Performed by: PAIN MEDICINE

## 2017-09-08 PROCEDURE — 86592 SYPHILIS TEST NON-TREP QUAL: CPT

## 2017-09-08 PROCEDURE — 87070 CULTURE OTHR SPECIMN AEROBIC: CPT

## 2017-09-08 PROCEDURE — 36415 COLL VENOUS BLD VENIPUNCTURE: CPT

## 2017-09-08 PROCEDURE — 87799 DETECT AGENT NOS DNA QUANT: CPT

## 2017-09-08 PROCEDURE — 89051 BODY FLUID CELL COUNT: CPT

## 2017-09-08 PROCEDURE — 71000033 HC RECOVERY, INTIAL HOUR

## 2017-09-08 PROCEDURE — 87210 SMEAR WET MOUNT SALINE/INK: CPT

## 2017-09-08 PROCEDURE — 86403 PARTICLE AGGLUT ANTBDY SCRN: CPT

## 2017-09-08 PROCEDURE — 87798 DETECT AGENT NOS DNA AMP: CPT | Mod: 91

## 2017-09-08 PROCEDURE — 86256 FLUORESCENT ANTIBODY TITER: CPT | Mod: 91

## 2017-09-08 PROCEDURE — 82164 ANGIOTENSIN I ENZYME TEST: CPT

## 2017-09-08 PROCEDURE — 86651 ENCEPHALITIS CALIFORN ANTBDY: CPT

## 2017-09-08 PROCEDURE — 84157 ASSAY OF PROTEIN OTHER: CPT

## 2017-09-08 PROCEDURE — 87798 DETECT AGENT NOS DNA AMP: CPT

## 2017-09-08 PROCEDURE — 25000003 PHARM REV CODE 250: Performed by: PAIN MEDICINE

## 2017-09-08 PROCEDURE — 87533 HHV-6 DNA QUANT: CPT

## 2017-09-08 PROCEDURE — 80048 BASIC METABOLIC PNL TOTAL CA: CPT

## 2017-09-08 PROCEDURE — 94760 N-INVAS EAR/PLS OXIMETRY 1: CPT

## 2017-09-08 PROCEDURE — 63600175 PHARM REV CODE 636 W HCPCS: Performed by: INTERNAL MEDICINE

## 2017-09-08 PROCEDURE — 99233 SBSQ HOSP IP/OBS HIGH 50: CPT | Mod: ,,, | Performed by: HOSPITALIST

## 2017-09-08 PROCEDURE — 25000003 PHARM REV CODE 250: Performed by: INTERNAL MEDICINE

## 2017-09-08 PROCEDURE — D9220A PRA ANESTHESIA: Mod: CRNA,,, | Performed by: NURSE ANESTHETIST, CERTIFIED REGISTERED

## 2017-09-08 PROCEDURE — 82962 GLUCOSE BLOOD TEST: CPT

## 2017-09-08 PROCEDURE — A9585 GADOBUTROL INJECTION: HCPCS | Performed by: HOSPITALIST

## 2017-09-08 PROCEDURE — 99233 SBSQ HOSP IP/OBS HIGH 50: CPT | Mod: ,,, | Performed by: PSYCHIATRY & NEUROLOGY

## 2017-09-08 PROCEDURE — 84100 ASSAY OF PHOSPHORUS: CPT

## 2017-09-08 PROCEDURE — 25000003 PHARM REV CODE 250: Performed by: PHYSICIAN ASSISTANT

## 2017-09-08 PROCEDURE — 37000008 HC ANESTHESIA 1ST 15 MINUTES

## 2017-09-08 PROCEDURE — 25500020 PHARM REV CODE 255: Performed by: HOSPITALIST

## 2017-09-08 PROCEDURE — 83735 ASSAY OF MAGNESIUM: CPT

## 2017-09-08 PROCEDURE — 87529 HSV DNA AMP PROBE: CPT

## 2017-09-08 PROCEDURE — 11000001 HC ACUTE MED/SURG PRIVATE ROOM

## 2017-09-08 PROCEDURE — 86788 WEST NILE VIRUS AB IGM: CPT

## 2017-09-08 PROCEDURE — 85025 COMPLETE CBC W/AUTO DIFF WBC: CPT

## 2017-09-08 PROCEDURE — 27000221 HC OXYGEN, UP TO 24 HOURS

## 2017-09-08 PROCEDURE — 86789 WEST NILE VIRUS ANTIBODY: CPT

## 2017-09-08 PROCEDURE — 37000009 HC ANESTHESIA EA ADD 15 MINS

## 2017-09-08 PROCEDURE — D9220A PRA ANESTHESIA: Mod: ANES,,, | Performed by: ANESTHESIOLOGY

## 2017-09-08 PROCEDURE — 71000039 HC RECOVERY, EACH ADD'L HOUR

## 2017-09-08 PROCEDURE — 009U3ZX DRAINAGE OF SPINAL CANAL, PERCUTANEOUS APPROACH, DIAGNOSTIC: ICD-10-PCS | Performed by: ANESTHESIOLOGY

## 2017-09-08 PROCEDURE — 82945 GLUCOSE OTHER FLUID: CPT

## 2017-09-08 RX ORDER — PHENYLEPHRINE HYDROCHLORIDE 10 MG/ML
INJECTION INTRAVENOUS
Status: DISCONTINUED | OUTPATIENT
Start: 2017-09-08 | End: 2017-09-08

## 2017-09-08 RX ORDER — GADOBUTROL 604.72 MG/ML
10 INJECTION INTRAVENOUS
Status: COMPLETED | OUTPATIENT
Start: 2017-09-08 | End: 2017-09-08

## 2017-09-08 RX ORDER — PROPOFOL 10 MG/ML
VIAL (ML) INTRAVENOUS
Status: DISCONTINUED | OUTPATIENT
Start: 2017-09-08 | End: 2017-09-08

## 2017-09-08 RX ORDER — EPINEPHRINE 0.1 MG/ML
INJECTION INTRAVENOUS
Status: DISCONTINUED | OUTPATIENT
Start: 2017-09-08 | End: 2017-09-08

## 2017-09-08 RX ORDER — POTASSIUM CHLORIDE 20 MEQ/1
40 TABLET, EXTENDED RELEASE ORAL
Status: DISPENSED | OUTPATIENT
Start: 2017-09-08 | End: 2017-09-08

## 2017-09-08 RX ORDER — SUCCINYLCHOLINE CHLORIDE 20 MG/ML
INJECTION INTRAMUSCULAR; INTRAVENOUS
Status: DISCONTINUED | OUTPATIENT
Start: 2017-09-08 | End: 2017-09-08

## 2017-09-08 RX ORDER — SODIUM CHLORIDE 9 MG/ML
INJECTION, SOLUTION INTRAVENOUS CONTINUOUS PRN
Status: DISCONTINUED | OUTPATIENT
Start: 2017-09-08 | End: 2017-09-08

## 2017-09-08 RX ORDER — ACETAMINOPHEN 325 MG/1
650 TABLET ORAL EVERY 6 HOURS PRN
Status: DISCONTINUED | OUTPATIENT
Start: 2017-09-08 | End: 2017-09-18 | Stop reason: HOSPADM

## 2017-09-08 RX ORDER — FENTANYL CITRATE 50 UG/ML
INJECTION, SOLUTION INTRAMUSCULAR; INTRAVENOUS
Status: DISCONTINUED | OUTPATIENT
Start: 2017-09-08 | End: 2017-09-08

## 2017-09-08 RX ADMIN — FENTANYL CITRATE 50 MCG: 50 INJECTION, SOLUTION INTRAMUSCULAR; INTRAVENOUS at 02:09

## 2017-09-08 RX ADMIN — POTASSIUM CHLORIDE 10 MEQ: 750 CAPSULE, EXTENDED RELEASE ORAL at 09:09

## 2017-09-08 RX ADMIN — CEFTRIAXONE 2 G: 2 INJECTION, SOLUTION INTRAVENOUS at 12:09

## 2017-09-08 RX ADMIN — ACYCLOVIR SODIUM 1500 MG: 50 INJECTION, SOLUTION INTRAVENOUS at 05:09

## 2017-09-08 RX ADMIN — EPINEPHRINE 10 MCG: 0.1 INJECTION, SOLUTION ENDOTRACHEAL; INTRACARDIAC; INTRAVENOUS at 03:09

## 2017-09-08 RX ADMIN — ASPIRIN 300 MG: 300 SUPPOSITORY RECTAL at 09:09

## 2017-09-08 RX ADMIN — SODIUM CHLORIDE: 0.9 INJECTION, SOLUTION INTRAVENOUS at 03:09

## 2017-09-08 RX ADMIN — LEVOTHYROXINE SODIUM ANHYDROUS 50 MCG: 100 INJECTION, POWDER, LYOPHILIZED, FOR SOLUTION INTRAVENOUS at 09:09

## 2017-09-08 RX ADMIN — PHENYLEPHRINE HYDROCHLORIDE 200 MCG: 10 INJECTION INTRAVENOUS at 03:09

## 2017-09-08 RX ADMIN — LEVETIRACETAM 500 MG: 5 INJECTION INTRAVENOUS at 09:09

## 2017-09-08 RX ADMIN — LEVETIRACETAM 500 MG: 5 INJECTION INTRAVENOUS at 10:09

## 2017-09-08 RX ADMIN — MIDAZOLAM HYDROCHLORIDE 1 MG: 1 INJECTION, SOLUTION INTRAMUSCULAR; INTRAVENOUS at 02:09

## 2017-09-08 RX ADMIN — GADOBUTROL 10 ML: 604.72 INJECTION INTRAVENOUS at 04:09

## 2017-09-08 RX ADMIN — PROPOFOL 150 MG: 10 INJECTION, EMULSION INTRAVENOUS at 03:09

## 2017-09-08 RX ADMIN — CEFTRIAXONE 2 G: 2 INJECTION, SOLUTION INTRAVENOUS at 01:09

## 2017-09-08 RX ADMIN — SUCCINYLCHOLINE CHLORIDE 120 MG: 20 INJECTION, SOLUTION INTRAMUSCULAR; INTRAVENOUS at 03:09

## 2017-09-08 RX ADMIN — ACYCLOVIR SODIUM 1500 MG: 50 INJECTION, SOLUTION INTRAVENOUS at 04:09

## 2017-09-08 NOTE — PT/OT/SLP PROGRESS
Speech Language Pathology  Attempted    Blue Cassidy  MRN: [id number]    Patient not seen today secondary to pt npo pending procedures. Will follow-up at a later date and time as pt is able to participate.  Thank you.    Zoila Santo CCC-SLP   9/8/2017

## 2017-09-08 NOTE — ASSESSMENT & PLAN NOTE
Recent Labs      09/07/17   1214  09/07/17   1732  09/07/17   2120  09/08/17   1145   [id number]  127*  95  103  213*     On SS

## 2017-09-08 NOTE — ASSESSMENT & PLAN NOTE
9/8- atempting to set up LP and MRI under sedation with anesthesia    AMS: (Rapid change within a week)   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) - little improvement per daughters after the start of Acyclovir   - ?? Seizure   - Paraneoplastic process   - Other infection (UTI)  - on empiric acyclovir     On IVFs for hydration, npo     - Fall precaution   - Check B12, MMA, NH3, B1, B6, HIV, RPR, ESR, CRP, TPO, ROXANN  - collected  - Urine drug screen - ordered  - EEG  - ordered  - Autoimmune encephalitis panel in serum - ordered  - MRI brain with and without contrast   - LP w/ CSF analysis:   · Cell count/dif, protein, glucose, ACE    · Infectious panel: arbovirus, CMV, HBV, EBV, HSV, HHV-6, HIV, AMMON Virus(PML), [place] equine, Varicella zoster, West Nile, VDRL CSF, crypto, CJD 14-3-3    · Culture: bacterial/fungal cx  · Paraneoplastic panel   ·   Patient need to be sedated for MRI and LP.   Would need anaesthesia help considering patient need sedation and difficult LP due to body habitus     Abnormal CT head with chronic microvascular ischemic changes  -1.2 cm area of hypodensity in the right parietal lobe without obvious cytotoxic edema, given irregular margins and confinement to white matter.    -Findings most likely represent components of chronic small vessel ischemic disease.  -Nonspecific hypodensity in the right cerebellar hemisphere.  MRI of the brain without and with contrast obtained for further evaluation

## 2017-09-08 NOTE — PROGRESS NOTES
[facility]  Neurology  Progress Note    Patient Name: Blue Cassidy  MRN: [id number]  Admission Date: 9/6/2017  Hospital Length of Stay: 2 days  Code Status: Full Code   Attending Provider: Lien Alba MD  Primary Care Physician: Primary Doctor No   Principal Problem:Acute encephalopathy    Interval History:  9/8/2018: No cute events overnight. No change in mental status.       Past Medical History:   Diagnosis Date    CHF (congestive heart failure)     COPD (chronic obstructive pulmonary disease)     CVA (cerebral vascular accident)     Diabetes     GERD (gastroesophageal reflux disease)     Gout     Hypertension     Thyroid disease     UTI (urinary tract infection)        Past Surgical History:   Procedure Laterality Date    HYSTERECTOMY      JOINT REPLACEMENT      right knee    [place] REMOVAL GALLBLADDER      Cholecystectomy    THYROID SURGERY         Review of patient's allergies indicates:  No Known Allergies    Current Neurological Medications:     No current facility-administered medications on file prior to encounter.      No current outpatient prescriptions on file prior to encounter.     Family History     None        Social History Main Topics    Smoking status: Former Smoker     Years: 15.00     Types: Cigarettes    Smokeless tobacco: Former User      Comment: greater than 18 years    Alcohol use No    Drug use: No    Sexual activity: Not Currently     Review of Systems   Unable to perform ROS: Mental status change     Objective:     Vital Signs (Most Recent):  Temp: 97.7 °F (36.5 °C) (09/08/17 1144)  Pulse: 70 (09/08/17 1144)  Resp: 18 (09/08/17 1144)  BP: (!) 140/65 (09/08/17 1144)  SpO2: 96 % (09/08/17 1144) Vital Signs (24h Range):  Temp:  [97.4 °F (36.3 °C)-98.9 °F (37.2 °C)] 97.7 °F (36.5 °C)  Pulse:  [60-75] 70  Resp:  [18] 18  SpO2:  [93 %-98 %] 96 %  BP: (140-175)/(65-91) 140/65     Weight: (!) 145.3 kg (320 lb 4.8 oz)  Body mass index is 54.98  kg/m².    Physical Exam   Constitutional: She appears well-developed and well-nourished.   HENT:   Head: Normocephalic and atraumatic.   Right Ear: External ear normal.   Left Ear: External ear normal.   Eyes: Conjunctivae are normal.   Right eye droopy    Neck: Neck supple.   Pulmonary/Chest: Effort normal and breath sounds normal.   Abdominal: Soft. Bowel sounds are normal.   Musculoskeletal: She exhibits edema and tenderness.   Neurological: She has a normal Finger-Nose-Finger Test.   Reflex Scores:       Tricep reflexes are 1+ on the right side and 1+ on the left side.       Bicep reflexes are 1+ on the right side and 1+ on the left side.       Brachioradialis reflexes are 1+ on the right side and 1+ on the left side.       Patellar reflexes are 1+ on the right side and 1+ on the left side.       Achilles reflexes are 1+ on the right side and 1+ on the left side.  Skin: Skin is warm.   Psychiatric: Her speech is slurred.       NEUROLOGICAL EXAMINATION:     MENTAL STATUS   Oriented to person.   Disoriented to place.   Disoriented to time.   Attention: decreased. Concentration: decreased.   Speech: slurred   Level of consciousness: drowsy  Knowledge: poor.   Unable to name object. Unable to read. Unable to repeat.        Only oriented to her one daughter. Follow simple commands like stick your tongue out, raised your arms and legs. Not oriented to time and place.      CRANIAL NERVES        Pupil equal and reactive, tracking. No cranial nerve abnormalities noted on my exam. Except right eye little ptosis.      MOTOR EXAM   Muscle bulk: normal  Overall muscle tone: normal       Moving all her extremities spontaneously. Upper more than lower extremities. Unablr to check exact strength.       REFLEXES     Reflexes   Right brachioradialis: 1+  Left brachioradialis: 1+  Right biceps: 1+  Left biceps: 1+  Right triceps: 1+  Left triceps: 1+  Right patellar: 1+  Left patellar: 1+  Right achilles: 1+  Left achilles:  1+  Right : 1+  Left : 1+  Right plantar: equivocal  Left plantar: equivocal  Right ankle clonus: absent  Left ankle clonus: absent    SENSORY EXAM        Responded to pain. Unable to check other modalities.      GAIT AND COORDINATION      Coordination   Finger to nose coordination: normal       Gait not checked. AMS unable to ambulate for last one week.        Significant Labs:   CBC:     Recent Labs  Lab 09/06/17  1724 09/08/17  0525   WBC 7.29 6.48   HGB 12.8 12.3   HCT 38.5 38.1    151     CMP:     Recent Labs  Lab 09/06/17  1724 09/08/17  0525   * 131*    140   K 3.9 3.2*    102   CO2 28 28   BUN 14 16   CREATININE 0.8 1.2   CALCIUM 9.5 8.8   MG  --  1.7   PROT 7.9  --    ALBUMIN 3.4*  --    BILITOT 0.8  --    ALKPHOS 67  --    AST 32  --    ALT 18  --    ANIONGAP 10 10   EGFRNONAA >60.0 44.9*     Urine Culture: No results for input(s): LABURIN in the last 48 hours.    Significant Imaging:     CT Brain:  1.2 cm area of hypodensity in the right parietal lobe without obvious cytotoxic edema, given irregular margins and confinement to white matter.  Findings most likely represent components of chronic small vessel ischemic disease.  Nonspecific hypodensity in the right cerebellar hemisphere.  MRI of the brain without and with contrast obtained for further evaluation.  No evidence of intracranial hemorrhage or territorial infarction.    MRI Brain without contrast:  No evidence of acute infarction.  Markedly limited examination.       EEG:  Abnormal EEG due to diffuse suppression and disorganization of   the background, suggesting a moderate encephalopathy.  There were no focal   features and no evidence of seizure activity.     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 Seizure   -  Paraneoplastic process   - Other infection (UTI)     Plan:   - Fall precaution   - Check B12, MMA, NH3, B1, B6, HIV, RPR, ESR, CRP  - Urine drug screen  - Autoimmune encephalitis panel in serum   - MRI brain with and without contrast   - LP w/ CSF analysis:   · Cell count/dif, protein, glucose, ACE    · Infectious panel: arbovirus, CMV, HBV, EBV, HSV, HHV-6, HIV, AMMON Virus(PML), [place] equine, Varicella zoster, West Nile, VDRL CSF, crypto, CJD 14-3-3    · Culture: bacterial/fungal cx  · Paraneoplastic panel   - Hold Keppra   - Continue Acyclovir    - Coordinated with anaesthesia regarding LP and MRI done at the same time.    - Case discussed with Dr Maurice  - Will follow              VTE Risk Mitigation         Ordered     enoxaparin injection 40 mg  Daily     Route:  Subcutaneous        09/06/17 1954     Medium Risk of VTE  Once      09/06/17 1954     Place sequential compression device  Until discontinued      09/06/17 1954     Place ROSITA hose  Until discontinued      09/06/17 1954          Rose Gallagher II, MD  Neurology  [facility]

## 2017-09-08 NOTE — PLAN OF CARE
Problem: Patient Care Overview  Goal: Plan of Care Review  Outcome: Ongoing (interventions implemented as appropriate)  POC reviewed with pt and family . Pt/family verbalized understanding. Questions and concerns addressed. Pt went for 2D ECHO today but was unable to perform due to pt uncooperation and confusion . Pt is schedule for MRI and LP with sedation;Pt is very confused . Will continue to monitor. See flowsheet for full assessment and vs info.    Problem: Fall Risk (Adult)  Goal: Identify Related Risk Factors and Signs and Symptoms  Related risk factors and signs and symptoms are identified upon initiation of Human Response Clinical Practice Guideline (CPG)   Outcome: Ongoing (interventions implemented as appropriate)  Pt remained free from falls throughout shift.    Problem: Diabetes, Type 2 (Adult)  Goal: Signs and Symptoms of Listed Potential Problems Will be Absent, Minimized or Managed (Diabetes, Type 2)  Signs and symptoms of listed potential problems will be absent, minimized or managed by discharge/transition of care (reference Diabetes, Type 2 (Adult) CPG).   Outcome: Ongoing (interventions implemented as appropriate)  Pt blood glucose levels varied throughout day from 150-90's; remained NPO throughout day; will continue to monitor.

## 2017-09-08 NOTE — SUBJECTIVE & OBJECTIVE
Interval History: woke up this am and talking, but still confused    Review of Systems   Constitutional: Positive for fatigue. Negative for chills, diaphoresis and fever.   HENT: Positive for trouble swallowing. Negative for congestion and sinus pain.    Respiratory: Negative for cough and shortness of breath.    Cardiovascular: Negative for chest pain and leg swelling.   Gastrointestinal: Negative for abdominal distention, abdominal pain, blood in stool, constipation, diarrhea and vomiting.   Genitourinary: Negative for difficulty urinating.   Musculoskeletal: Negative for back pain, gait problem, joint swelling, myalgias, neck pain and neck stiffness.   Skin: Negative for rash.   Neurological: Negative for tremors, weakness, numbness and headaches.   Psychiatric/Behavioral: Positive for agitation, behavioral problems and confusion.     Objective:     Vital Signs (Most Recent):  Temp: 98.9 °F (37.2 °C) (09/08/17 0900)  Pulse: 68 (09/08/17 0900)  Resp: 18 (09/08/17 0900)  BP: (!) 166/77 (09/08/17 0900)  SpO2: 97 % (09/08/17 0900) Vital Signs (24h Range):  Temp:  [97.4 °F (36.3 °C)-98.9 °F (37.2 °C)] 98.9 °F (37.2 °C)  Pulse:  [60-75] 68  Resp:  [18] 18  SpO2:  [92 %-98 %] 97 %  BP: (145-175)/(69-91) 166/77     Weight: (!) 145.3 kg (320 lb 4.8 oz)  Body mass index is 54.98 kg/m².    Intake/Output Summary (Last 24 hours) at 09/08/17 1149  Last data filed at 09/08/17 0300   Gross per 24 hour   Intake              480 ml   Output             1000 ml   Net             -520 ml      Physical Exam   Constitutional: She is oriented to person, place, and time. She appears well-developed and well-nourished. She appears lethargic.   obese   HENT:   Head: Normocephalic and atraumatic.   Mouth/Throat: Oropharynx is clear and moist.   Eyes: Conjunctivae and EOM are normal. Pupils are equal, round, and reactive to light. No scleral icterus.   Neck: Normal range of motion. Neck supple. No thyromegaly present.   Cardiovascular:  Normal rate, regular rhythm and normal heart sounds.    Pulmonary/Chest: Effort normal and breath sounds normal. No respiratory distress. She has no wheezes. She has no rales.   Abdominal: Soft. Bowel sounds are normal. She exhibits no distension and no mass. There is no tenderness. There is no rebound and no guarding. No hernia.   Musculoskeletal: Normal range of motion. She exhibits edema and deformity.   Lymphadenopathy:     She has no cervical adenopathy.   Neurological: She is oriented to person, place, and time. She has normal strength. She appears lethargic. GCS eye subscore is 4. GCS verbal subscore is 4. GCS motor subscore is 5.   Psychiatric: She has a normal mood and affect. Her behavior is normal. Her speech is delayed. Cognition and memory are impaired. She exhibits abnormal recent memory and abnormal remote memory. She is inattentive.       Significant Labs:   CBC:     Recent Labs  Lab 09/06/17  1724 09/08/17  0525   WBC 7.29 6.48   HGB 12.8 12.3   HCT 38.5 38.1    151     CMP:     Recent Labs  Lab 09/06/17  1724 09/08/17  0525    140   K 3.9 3.2*    102   CO2 28 28   * 131*   BUN 14 16   CREATININE 0.8 1.2   CALCIUM 9.5 8.8   PROT 7.9  --    ALBUMIN 3.4*  --    BILITOT 0.8  --    ALKPHOS 67  --    AST 32  --    ALT 18  --    ANIONGAP 10 10   EGFRNONAA >60.0 44.9*       Significant Imaging: I have reviewed and interpreted all pertinent imaging results/findings within the past 24 hours.

## 2017-09-08 NOTE — SUBJECTIVE & OBJECTIVE
Past Medical History:   Diagnosis Date    CHF (congestive heart failure)     COPD (chronic obstructive pulmonary disease)     CVA (cerebral vascular accident)     Diabetes     GERD (gastroesophageal reflux disease)     Gout     Hypertension     Thyroid disease     UTI (urinary tract infection)        Past Surgical History:   Procedure Laterality Date    HYSTERECTOMY      JOINT REPLACEMENT      right knee    [place] REMOVAL GALLBLADDER      Cholecystectomy    THYROID SURGERY         Review of patient's allergies indicates:  No Known Allergies    Current Neurological Medications:     No current facility-administered medications on file prior to encounter.      No current outpatient prescriptions on file prior to encounter.     Family History     None        Social History Main Topics    Smoking status: Former Smoker     Years: 15.00     Types: Cigarettes    Smokeless tobacco: Former User      Comment: greater than 18 years    Alcohol use No    Drug use: No    Sexual activity: Not Currently     Review of Systems   Unable to perform ROS: Mental status change     Objective:     Vital Signs (Most Recent):  Temp: 97.7 °F (36.5 °C) (09/08/17 1144)  Pulse: 70 (09/08/17 1144)  Resp: 18 (09/08/17 1144)  BP: (!) 140/65 (09/08/17 1144)  SpO2: 96 % (09/08/17 1144) Vital Signs (24h Range):  Temp:  [97.4 °F (36.3 °C)-98.9 °F (37.2 °C)] 97.7 °F (36.5 °C)  Pulse:  [60-75] 70  Resp:  [18] 18  SpO2:  [93 %-98 %] 96 %  BP: (140-175)/(65-91) 140/65     Weight: (!) 145.3 kg (320 lb 4.8 oz)  Body mass index is 54.98 kg/m².    Physical Exam   Constitutional: She appears well-developed and well-nourished.   HENT:   Head: Normocephalic and atraumatic.   Right Ear: External ear normal.   Left Ear: External ear normal.   Eyes: Conjunctivae are normal.   Right eye droopy    Neck: Neck supple.   Pulmonary/Chest: Effort normal and breath sounds normal.   Abdominal: Soft. Bowel sounds are normal.   Musculoskeletal: She exhibits edema  and tenderness.   Neurological: She has a normal Finger-Nose-Finger Test.   Reflex Scores:       Tricep reflexes are 1+ on the right side and 1+ on the left side.       Bicep reflexes are 1+ on the right side and 1+ on the left side.       Brachioradialis reflexes are 1+ on the right side and 1+ on the left side.       Patellar reflexes are 1+ on the right side and 1+ on the left side.       Achilles reflexes are 1+ on the right side and 1+ on the left side.  Skin: Skin is warm.   Psychiatric: Her speech is slurred.       NEUROLOGICAL EXAMINATION:     MENTAL STATUS   Oriented to person.   Disoriented to place.   Disoriented to time.   Attention: decreased. Concentration: decreased.   Speech: slurred   Level of consciousness: drowsy  Knowledge: poor.   Unable to name object. Unable to read. Unable to repeat.        Only oriented to her one daughter. Follow simple commands like stick your tongue out, raised your arms and legs. Not oriented to time and place.      CRANIAL NERVES        Pupil equal and reactive, tracking. No cranial nerve abnormalities noted on my exam. Except right eye little ptosis.      MOTOR EXAM   Muscle bulk: normal  Overall muscle tone: normal       Moving all her extremities spontaneously. Upper more than lower extremities. Unablr to check exact strength.       REFLEXES     Reflexes   Right brachioradialis: 1+  Left brachioradialis: 1+  Right biceps: 1+  Left biceps: 1+  Right triceps: 1+  Left triceps: 1+  Right patellar: 1+  Left patellar: 1+  Right achilles: 1+  Left achilles: 1+  Right : 1+  Left : 1+  Right plantar: equivocal  Left plantar: equivocal  Right ankle clonus: absent  Left ankle clonus: absent    SENSORY EXAM        Responded to pain. Unable to check other modalities.      GAIT AND COORDINATION      Coordination   Finger to nose coordination: normal       Gait not checked. AMS unable to ambulate for last one week.        Significant Labs:   CBC:     Recent Labs  Lab  09/06/17  1724 09/08/17  0525   WBC 7.29 6.48   HGB 12.8 12.3   HCT 38.5 38.1    151     CMP:     Recent Labs  Lab 09/06/17  1724 09/08/17  0525   * 131*    140   K 3.9 3.2*    102   CO2 28 28   BUN 14 16   CREATININE 0.8 1.2   CALCIUM 9.5 8.8   MG  --  1.7   PROT 7.9  --    ALBUMIN 3.4*  --    BILITOT 0.8  --    ALKPHOS 67  --    AST 32  --    ALT 18  --    ANIONGAP 10 10   EGFRNONAA >60.0 44.9*     Urine Culture: No results for input(s): LABURIN in the last 48 hours.    Significant Imaging:     CT Brain:  1.2 cm area of hypodensity in the right parietal lobe without obvious cytotoxic edema, given irregular margins and confinement to white matter.  Findings most likely represent components of chronic small vessel ischemic disease.  Nonspecific hypodensity in the right cerebellar hemisphere.  MRI of the brain without and with contrast obtained for further evaluation.  No evidence of intracranial hemorrhage or territorial infarction.    MRI Brain without contrast:  No evidence of acute infarction.  Markedly limited examination.

## 2017-09-08 NOTE — PHYSICIAN QUERY
"PT Name: Blue Cassidy  MR #: [id number]    Physician Query Form - Heart  Condition Clarification     CDS/: Garrison Ruff               Contact information: [phone]  This form is a permanent document in the medical record.     Query Date: September 8, 2017    By submitting this query, we are merely seeking further clarification of documentation. Please utilize your independent clinical judgment when addressing the question(s) below.    The medical record contains the following   Indicators     Supporting Clinical Findings Location in Medical Record    BNP      EF      Radiology findings     x Echo Results CONCLUSIONS :     1 - Normal left ventricular systolic function (EF 55-60%).   2 - Indeterminate LV diastolic function.   3 - Normal right ventricular systolic function .   4 - The estimated PA systolic pressure is greater than 27 mmHg.   5 - Mild to moderate aortic regurgitation.   6 - Mild pulmonic regurgitation.   7 - Enlarged ascending aorta measuering 4.1 cm.     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                               [  ] Acute Diastolic Heart Failure ( EF > 40)*  [  ] Acute on Chronic Diastolic Heart Failure( EF > 40)*  [  ] Chronic Diastolic Heart Failure (EF > 40)*  [  ] Other Type of Heart Failure (please specify type): _________________________  [ x ] Heart Failure Ruled Out  [  ] Other (please specify): ___________________________________  [  ] Clinically Undetermined            *American Heart Association                                                                                                    "       Please document in your progress notes daily for the duration of treatment until resolved and include in your discharge summary.

## 2017-09-08 NOTE — HOSPITAL COURSE
9/8/2017: No cute events overnight. No change in mental status.    9/9/2017:Much better this morning. Was sitting in chair. Following commands and recognized her daughter.   9/10/2017:Much better this morning. Was sitting in chair. Following commands and recognized her daughter. Did not sleep last night and was agitated.

## 2017-09-08 NOTE — ASSESSMENT & PLAN NOTE
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 Seizure   - Paraneoplastic process   - Other infection (UTI)     Plan:   - Fall precaution   - Check B12, MMA, NH3, B1, B6, HIV, RPR, ESR, CRP  - Urine drug screen  - EEG   - Autoimmune encephalitis panel in serum   - MRI brain with and without contrast   - LP w/ CSF analysis:   · Cell count/dif, protein, glucose, ACE    · Infectious panel: arbovirus, CMV, HBV, EBV, HSV, HHV-6, HIV, AMMON Virus(PML), [place] equine, Varicella zoster, West Nile, VDRL CSF, crypto, CJD 14-3-3    · Culture: bacterial/fungal cx  · Paraneoplastic panel   - Hold Keppra   - Continue Acyclovir    - Coordinated with anaesthesia regarding LP and MRI done at the same time.    - Case discussed with Dr Maurice  - Will follow

## 2017-09-08 NOTE — PLAN OF CARE
Problem: Patient Care Overview  Goal: Plan of Care Review  Outcome: Ongoing (interventions implemented as appropriate)  POC reviewed with pt and family. Pt/family verbalized understanding. Questions and concerns addressed.  Pt has been off the floor for MRI/LP/ECHO since 1200 noon. Will continue to monitor. See flowsheet for full assessment and vs info.

## 2017-09-08 NOTE — PLAN OF CARE
Problem: Patient Care Overview  Goal: Plan of Care Review  Outcome: Ongoing (interventions implemented as appropriate)  Plan of care discussed with pt and family. No questions or concerns this shift. Alert and oriented to person, place, situation. Pt was given a diet order earlier this shift and tolerated PO fluids/food without any difficulty swallowing.  NPO after midnight for tests in a.m. More talkative and cooperative. Will cont to monitor. Call light in reach, family at bedside.     Problem: Infection, Risk/Actual (Adult)  Goal: Identify Related Risk Factors and Signs and Symptoms  Related risk factors and signs and symptoms are identified upon initiation of Human Response Clinical Practice Guideline (CPG)   Outcome: Ongoing (interventions implemented as appropriate)  VSS, afebrile this shift, IV fluids and antibiotics given tolerating well, lab values being monitored. Will cont to monitor.     Problem: Fall Risk (Adult)  Goal: Identify Related Risk Factors and Signs and Symptoms  Related risk factors and signs and symptoms are identified upon initiation of Human Response Clinical Practice Guideline (CPG)   Outcome: Ongoing (interventions implemented as appropriate)  Educated pt and family on fall precautions. Acknowledged understanding. Fall precautions in place at present. Family remain at bedside. Will cont to monitor.     Problem: Diabetes, Type 2 (Adult)  Goal: Signs and Symptoms of Listed Potential Problems Will be Absent, Minimized or Managed (Diabetes, Type 2)  Signs and symptoms of listed potential problems will be absent, minimized or managed by discharge/transition of care (reference Diabetes, Type 2 (Adult) CPG).   Outcome: Ongoing (interventions implemented as appropriate)  Pt and family provided with diabetes education. No questions or concerns at present. Blood glucose checked with sliding scale insulin provided as needed. No s/s of hyper/hypoglycemia at present. Will cont to monitor.

## 2017-09-08 NOTE — TRANSFER OF CARE
"Anesthesia Transfer of Care Note    Patient: Blue Cassidy    Procedure(s) Performed: Procedure(s) (LRB):  PUNCTURE-LUMBAR  - RADIOLOGY PERFORMED (N/A)    Patient location: PACU    Anesthesia Type: general    Transport from OR: Transported from OR on 6-10 L/min O2 by face mask with adequate spontaneous ventilation    Post pain: adequate analgesia    Post assessment: tolerated procedure well and no apparent anesthetic complications    Post vital signs: stable    Level of consciousness: awake and alert    Nausea/Vomiting: no nausea/vomiting    Complications: none    Transfer of care protocol was followed      Last vitals:   Visit Vitals  BP (!) 180/81 (BP Location: Left arm, Patient Position: Lying)   Pulse 71   Temp 36.4 °C (97.6 °F) (Axillary)   Resp (!) 22   Ht 5' 4" (1.626 m)   Wt (!) 145.3 kg (320 lb 4.8 oz)   LMP  (LMP Unknown)   SpO2 100%   Breastfeeding? No   BMI 54.98 kg/m²     "

## 2017-09-08 NOTE — NURSING TRANSFER
Nursing Transfer Note      9/8/2017     Transfer To: [facility]    Transfer via bed    Transfer with pump    Transported by Nurse and PCT    Medicines sent: Rocephin    Chart send with patient: Yes    Notified: daughter    Patient reassessed at: 09/08/17, 1302    Upon arrival to floor: call bell in reach

## 2017-09-08 NOTE — PROGRESS NOTES
"[facility] Hospital Medicine  Progress Note    Patient Name: Blue Cassidy  MRN: [id number]  Patient Class: IP- Inpatient   Admission Date: 9/6/2017  Length of Stay: 2 days  Attending Physician: Lien Alba MD  Primary Care Provider: Primary Doctor [facility] Medicine Team: [facility]OS[facility] MED B Lien Alba MD    Subjective:     Principal Problem:Acute encephalopathy    HPI:  Ms. Cassidy is a 70yo lady with a past medical history of   She now comes as a transfer from [facility] after being admitted there on 9/4/17 with acute altered mental status with agitation.  She had a CT head done there at admission which revealed, "right parietal lobe ischemia."  She was also found to have a, "slightly abnormal UA" and was started on Rocephin 1g iv q24 hours.  She was admitted to the IM service and Neurology was consulted, who recommended an MRI brain.  This was done, but was of such a poor quality that is was un-interpretable.  The night after she was admitted she, "had two focal seizures.  The patient was placed on IV Keppra and an EEG was obtained this morning."  She also had to be given 2mg of iv haldol to control some combativeness.  Since receiving all of these therapies, she has been lethargic and unable to take anything by mouth.       Hospital Course:  9/7 - pt more alert, still not back to baseline mental status accord to family,  Neurology saw pt this am,  2 am, labs collected,  EEG ordered  9/8 - spoke w anesthesia fellow yesterday,  Place another consutlt for anesthesia today for LP and MRI under sedation,  Spoke to neurology fellow and he is going to set up procedures.    Interval History: woke up this am and talking, but still confused    Review of Systems   Constitutional: Positive for fatigue. Negative for chills, diaphoresis and fever.   HENT: Positive for trouble swallowing. Negative for congestion and sinus pain.    Respiratory: Negative for cough and " shortness of breath.    Cardiovascular: Negative for chest pain and leg swelling.   Gastrointestinal: Negative for abdominal distention, abdominal pain, blood in stool, constipation, diarrhea and vomiting.   Genitourinary: Negative for difficulty urinating.   Musculoskeletal: Negative for back pain, gait problem, joint swelling, myalgias, neck pain and neck stiffness.   Skin: Negative for rash.   Neurological: Negative for tremors, weakness, numbness and headaches.   Psychiatric/Behavioral: Positive for agitation, behavioral problems and confusion.     Objective:     Vital Signs (Most Recent):  Temp: 98.9 °F (37.2 °C) (09/08/17 0900)  Pulse: 68 (09/08/17 0900)  Resp: 18 (09/08/17 0900)  BP: (!) 166/77 (09/08/17 0900)  SpO2: 97 % (09/08/17 0900) Vital Signs (24h Range):  Temp:  [97.4 °F (36.3 °C)-98.9 °F (37.2 °C)] 98.9 °F (37.2 °C)  Pulse:  [60-75] 68  Resp:  [18] 18  SpO2:  [92 %-98 %] 97 %  BP: (145-175)/(69-91) 166/77     Weight: (!) 145.3 kg (320 lb 4.8 oz)  Body mass index is 54.98 kg/m².    Intake/Output Summary (Last 24 hours) at 09/08/17 1149  Last data filed at 09/08/17 0300   Gross per 24 hour   Intake              480 ml   Output             1000 ml   Net             -520 ml      Physical Exam   Constitutional: She is oriented to person, place, and time. She appears well-developed and well-nourished. She appears lethargic.   obese   HENT:   Head: Normocephalic and atraumatic.   Mouth/Throat: Oropharynx is clear and moist.   Eyes: Conjunctivae and EOM are normal. Pupils are equal, round, and reactive to light. No scleral icterus.   Neck: Normal range of motion. Neck supple. No thyromegaly present.   Cardiovascular: Normal rate, regular rhythm and normal heart sounds.    Pulmonary/Chest: Effort normal and breath sounds normal. No respiratory distress. She has no wheezes. She has no rales.   Abdominal: Soft. Bowel sounds are normal. She exhibits no distension and no mass. There is no tenderness. There is no  rebound and no guarding. No hernia.   Musculoskeletal: Normal range of motion. She exhibits edema and deformity.   Lymphadenopathy:     She has no cervical adenopathy.   Neurological: She is oriented to person, place, and time. She has normal strength. She appears lethargic. GCS eye subscore is 4. GCS verbal subscore is 4. GCS motor subscore is 5.   Psychiatric: She has a normal mood and affect. Her behavior is normal. Her speech is delayed. Cognition and memory are impaired. She exhibits abnormal recent memory and abnormal remote memory. She is inattentive.       Significant Labs:   CBC:     Recent Labs  Lab 09/06/17  1724 09/08/17  0525   WBC 7.29 6.48   HGB 12.8 12.3   HCT 38.5 38.1    151     CMP:     Recent Labs  Lab 09/06/17  1724 09/08/17  0525    140   K 3.9 3.2*    102   CO2 28 28   * 131*   BUN 14 16   CREATININE 0.8 1.2   CALCIUM 9.5 8.8   PROT 7.9  --    ALBUMIN 3.4*  --    BILITOT 0.8  --    ALKPHOS 67  --    AST 32  --    ALT 18  --    ANIONGAP 10 10   EGFRNONAA >60.0 44.9*       Significant Imaging: I have reviewed and interpreted all pertinent imaging results/findings within the past 24 hours.    Assessment/Plan:      * Acute encephalopathy    9/8- atempting to set up LP and MRI under sedation with anesthesia    AMS: (Rapid change within a week)   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) - little improvement per daughters after the start of Acyclovir   - ?? Seizure   - Paraneoplastic process   - Other infection (UTI)  - on empiric acyclovir     On IVFs for hydration, npo     - Fall precaution   - Check B12, MMA, NH3, B1, B6, HIV, RPR, ESR, CRP, TPO, ROXANN  - collected  - Urine drug screen - ordered  - EEG  - ordered  - Autoimmune encephalitis panel in serum - ordered  - MRI brain with and without contrast   - LP w/ CSF analysis:   · Cell count/dif, protein, glucose, ACE    · Infectious panel: arbovirus, CMV, HBV, EBV, HSV, HHV-6, HIV, AMMON Virus(PML),  [place] equine, Varicella zoster, West Nile, VDRL CSF, crypto, CJD 14-3-3    · Culture: bacterial/fungal cx  · Paraneoplastic panel   ·   Patient need to be sedated for MRI and LP.   Would need anaesthesia help considering patient need sedation and difficult LP due to body habitus     Abnormal CT head with chronic microvascular ischemic changes  -1.2 cm area of hypodensity in the right parietal lobe without obvious cytotoxic edema, given irregular margins and confinement to white matter.    -Findings most likely represent components of chronic small vessel ischemic disease.  -Nonspecific hypodensity in the right cerebellar hemisphere.  MRI of the brain without and with contrast obtained for further evaluation        Seizure    9/8 - stable in hospital, MS somewhat improved  New onset  keppra   eeg  Neurology consulted          Type 2 diabetes mellitus with complication    Recent Labs      09/07/17   1214  09/07/17   1732  09/07/17   2120  09/08/17   1145   POCTGLUCOSE  127*  95  103  213*     On SS          HTN (hypertension), malignant    -Start iv lopressor 5mg iv q6 hours  -Telemetry  -2D Echo with cfd    9/7 - 153/81        Hypokalemia    9/8 - kadur 40 x 2        Abnormal CT of the head    Focal finding on the right          Urinary tract infection without hematuria    Rocephin started          Altered mental status    Suspect seizure with viral encephalitis  keppra started              VTE Risk Mitigation         Ordered     enoxaparin injection 40 mg  Daily     Route:  Subcutaneous        09/06/17 1954     Medium Risk of VTE  Once      09/06/17 1954     Place sequential compression device  Until discontinued      09/06/17 1954     Place ROSITA hose  Until discontinued      09/06/17 1954              Lien Alba MD  Department of Hospital Medicine   [facility]

## 2017-09-08 NOTE — ANESTHESIA PREPROCEDURE EVALUATION
09/08/2017  Blue Cassidy is a 71 y.o., female with acute encephalopathy, CVA, Morbid obesity, BMI 54, GERD, HTN, COPD, and CHF, EF 55. Here for MRI and LP under general anesthesia due to uncooperative    Anesthesia Evaluation    I have reviewed the Patient Summary Reports.    I have reviewed the Nursing Notes.   I have reviewed the Medications.     Review of Systems  Anesthesia Hx:  No problems with previous Anesthesia  History of prior surgery of interest to airway management or planning: Denies Family Hx of Anesthesia complications.   Denies Personal Hx of Anesthesia complications.   Cardiovascular:   Hypertension CHF ECG has been reviewed.    Pulmonary:   COPD    Renal/:  Renal/ Normal     Hepatic/GI:   GERD    Musculoskeletal:  Musculoskeletal Normal    Neurological:   CVA Seizures Acute encephalopathy   Endocrine:   Diabetes        Physical Exam  General:  Morbid Obesity BMI 54    Airway/Jaw/Neck:  Airway Findings: Mouth Opening: Normal Tongue: Normal  General Airway Assessment: Adult  Unable to assess due to confusion, uncooperative  Jaw/Neck Findings:  Micrognathia: Negative Neck ROM: Normal ROM      Dental:  Dental Findings: In tact   Chest/Lungs:  Chest/Lungs Findings: Clear to auscultation, Normal Respiratory Rate     Heart/Vascular:  Heart Findings: Rate: Normal  Rhythm: Regular Rhythm  Sounds: Normal  Heart murmur: negative    Abdomen:  Abdomen Findings:  Normal, Nontender, Soft       Mental Status:  Mental Status Findings:  Confusion         Anesthesia Plan  Type of Anesthesia, risks & benefits discussed:  Anesthesia Type:  MAC, general  Patient's Preference:   Intra-op Monitoring Plan:   Intra-op Monitoring Plan Comments:   Post Op Pain Control Plan:   Post Op Pain Control Plan Comments:   Induction:   IV  Beta Blocker:  Patient is on a Beta-Blocker and has received one dose within the  past 24 hours (No further documentation required).       Informed Consent: Patient understands risks and agrees with Anesthesia plan.  Questions answered. Anesthesia consent signed with patient.  ASA Score: 3     Day of Surgery Review of History & Physical:    H&P update referred to the provider.     Anesthesia Plan Notes: Will attempt LP with secondary anesthesia staff in MRI. If unsuccessful, will transport intubated for LP with IR guidance.        Ready For Surgery From Anesthesia Perspective.

## 2017-09-09 LAB
ANION GAP SERPL CALC-SCNC: 10 MMOL/L
BACTERIA #/AREA URNS AUTO: ABNORMAL /HPF
BASOPHILS # BLD AUTO: 0.02 K/UL
BASOPHILS NFR BLD: 0.3 %
BILIRUB UR QL STRIP: NEGATIVE
BUN SERPL-MCNC: 17 MG/DL
CALCIUM SERPL-MCNC: 8.9 MG/DL
CHLORIDE SERPL-SCNC: 102 MMOL/L
CLARITY UR REFRACT.AUTO: ABNORMAL
CO2 SERPL-SCNC: 28 MMOL/L
COLOR UR AUTO: YELLOW
CREAT SERPL-MCNC: 1.6 MG/DL
CRYPTOC AG CSF QL LA: NEGATIVE
DIFFERENTIAL METHOD: ABNORMAL
EOSINOPHIL # BLD AUTO: 0.1 K/UL
EOSINOPHIL NFR BLD: 2 %
ERYTHROCYTE [DISTWIDTH] IN BLOOD BY AUTOMATED COUNT: 13.7 %
EST. GFR  (AFRICAN AMERICAN): 36.6 ML/MIN/1.73 M^2
EST. GFR  (NON AFRICAN AMERICAN): 31.7 ML/MIN/1.73 M^2
GLUCOSE SERPL-MCNC: 135 MG/DL
GLUCOSE UR QL STRIP: NEGATIVE
HCT VFR BLD AUTO: 37.8 %
HGB BLD-MCNC: 12.3 G/DL
HGB UR QL STRIP: ABNORMAL
KETONES UR QL STRIP: NEGATIVE
LEUKOCYTE ESTERASE UR QL STRIP: ABNORMAL
LYMPHOCYTES # BLD AUTO: 0.8 K/UL
LYMPHOCYTES NFR BLD: 11.8 %
MAGNESIUM SERPL-MCNC: 1.7 MG/DL
MCH RBC QN AUTO: 28.6 PG
MCHC RBC AUTO-ENTMCNC: 32.5 G/DL
MCV RBC AUTO: 88 FL
MICROSCOPIC COMMENT: ABNORMAL
MONOCYTES # BLD AUTO: 1.1 K/UL
MONOCYTES NFR BLD: 17.6 %
NEUTROPHILS # BLD AUTO: 4.4 K/UL
NEUTROPHILS NFR BLD: 68.3 %
NITRITE UR QL STRIP: NEGATIVE
PH UR STRIP: 5 [PH] (ref 5–8)
PHOSPHATE SERPL-MCNC: 2.9 MG/DL
PLATELET # BLD AUTO: 151 K/UL
PMV BLD AUTO: 10.8 FL
POCT GLUCOSE: 148 MG/DL (ref 70–110)
POCT GLUCOSE: 150 MG/DL (ref 70–110)
POCT GLUCOSE: 163 MG/DL (ref 70–110)
POCT GLUCOSE: 188 MG/DL (ref 70–110)
POCT GLUCOSE: 200 MG/DL (ref 70–110)
POCT GLUCOSE: 226 MG/DL (ref 70–110)
POTASSIUM SERPL-SCNC: 3.2 MMOL/L
PROT UR QL STRIP: NEGATIVE
RBC # BLD AUTO: 4.3 M/UL
RBC #/AREA URNS AUTO: 3 /HPF (ref 0–4)
RPR SER QL: NORMAL
SODIUM SERPL-SCNC: 140 MMOL/L
SP GR UR STRIP: 1.02 (ref 1–1.03)
SQUAMOUS #/AREA URNS AUTO: 5 /HPF
URN SPEC COLLECT METH UR: ABNORMAL
UROBILINOGEN UR STRIP-ACNC: NEGATIVE EU/DL
WBC # BLD AUTO: 6.37 K/UL
WBC #/AREA URNS AUTO: 10 /HPF (ref 0–5)

## 2017-09-09 PROCEDURE — 87086 URINE CULTURE/COLONY COUNT: CPT

## 2017-09-09 PROCEDURE — 84100 ASSAY OF PHOSPHORUS: CPT

## 2017-09-09 PROCEDURE — 25000003 PHARM REV CODE 250: Performed by: HOSPITALIST

## 2017-09-09 PROCEDURE — 85025 COMPLETE CBC W/AUTO DIFF WBC: CPT

## 2017-09-09 PROCEDURE — 25000003 PHARM REV CODE 250: Performed by: PHYSICIAN ASSISTANT

## 2017-09-09 PROCEDURE — 99232 SBSQ HOSP IP/OBS MODERATE 35: CPT | Mod: ,,, | Performed by: HOSPITALIST

## 2017-09-09 PROCEDURE — 63600175 PHARM REV CODE 636 W HCPCS: Performed by: INTERNAL MEDICINE

## 2017-09-09 PROCEDURE — 80048 BASIC METABOLIC PNL TOTAL CA: CPT

## 2017-09-09 PROCEDURE — 86703 HIV-1/HIV-2 1 RESULT ANTBDY: CPT

## 2017-09-09 PROCEDURE — 36415 COLL VENOUS BLD VENIPUNCTURE: CPT

## 2017-09-09 PROCEDURE — 83735 ASSAY OF MAGNESIUM: CPT

## 2017-09-09 PROCEDURE — 99233 SBSQ HOSP IP/OBS HIGH 50: CPT | Mod: ,,, | Performed by: PSYCHIATRY & NEUROLOGY

## 2017-09-09 PROCEDURE — 81001 URINALYSIS AUTO W/SCOPE: CPT

## 2017-09-09 PROCEDURE — 25000003 PHARM REV CODE 250: Performed by: INTERNAL MEDICINE

## 2017-09-09 PROCEDURE — 11000001 HC ACUTE MED/SURG PRIVATE ROOM

## 2017-09-09 PROCEDURE — 97162 PT EVAL MOD COMPLEX 30 MIN: CPT

## 2017-09-09 PROCEDURE — 86592 SYPHILIS TEST NON-TREP QUAL: CPT

## 2017-09-09 PROCEDURE — 97167 OT EVAL HIGH COMPLEX 60 MIN: CPT

## 2017-09-09 RX ORDER — ASPIRIN 325 MG
325 TABLET ORAL DAILY
Status: DISCONTINUED | OUTPATIENT
Start: 2017-09-09 | End: 2017-09-18 | Stop reason: HOSPADM

## 2017-09-09 RX ORDER — POTASSIUM CHLORIDE 20 MEQ/1
40 TABLET, EXTENDED RELEASE ORAL
Status: COMPLETED | OUTPATIENT
Start: 2017-09-09 | End: 2017-09-09

## 2017-09-09 RX ORDER — LEVOTHYROXINE SODIUM 50 UG/1
50 TABLET ORAL
Status: DISCONTINUED | OUTPATIENT
Start: 2017-09-10 | End: 2017-09-18 | Stop reason: HOSPADM

## 2017-09-09 RX ADMIN — ACYCLOVIR SODIUM 1500 MG: 50 INJECTION, SOLUTION INTRAVENOUS at 12:09

## 2017-09-09 RX ADMIN — ONDANSETRON 4 MG: 2 INJECTION INTRAMUSCULAR; INTRAVENOUS at 08:09

## 2017-09-09 RX ADMIN — ONDANSETRON 4 MG: 2 INJECTION INTRAMUSCULAR; INTRAVENOUS at 02:09

## 2017-09-09 RX ADMIN — ACYCLOVIR SODIUM 1500 MG: 50 INJECTION, SOLUTION INTRAVENOUS at 06:09

## 2017-09-09 RX ADMIN — CEFTRIAXONE 2 G: 2 INJECTION, SOLUTION INTRAVENOUS at 03:09

## 2017-09-09 RX ADMIN — ACYCLOVIR SODIUM 1500 MG: 50 INJECTION, SOLUTION INTRAVENOUS at 08:09

## 2017-09-09 RX ADMIN — LEVETIRACETAM 500 MG: 5 INJECTION INTRAVENOUS at 10:09

## 2017-09-09 RX ADMIN — POTASSIUM CHLORIDE 10 MEQ: 750 CAPSULE, EXTENDED RELEASE ORAL at 10:09

## 2017-09-09 RX ADMIN — ENOXAPARIN SODIUM 40 MG: 100 INJECTION SUBCUTANEOUS at 05:09

## 2017-09-09 RX ADMIN — ACYCLOVIR SODIUM 1500 MG: 50 INJECTION, SOLUTION INTRAVENOUS at 02:09

## 2017-09-09 RX ADMIN — POTASSIUM CHLORIDE 40 MEQ: 1500 TABLET, EXTENDED RELEASE ORAL at 11:09

## 2017-09-09 RX ADMIN — LEVETIRACETAM 500 MG: 5 INJECTION INTRAVENOUS at 08:09

## 2017-09-09 RX ADMIN — INSULIN ASPART 2 UNITS: 100 INJECTION, SOLUTION INTRAVENOUS; SUBCUTANEOUS at 12:09

## 2017-09-09 RX ADMIN — ASPIRIN 325 MG ORAL TABLET 325 MG: 325 PILL ORAL at 11:09

## 2017-09-09 RX ADMIN — LEVOTHYROXINE SODIUM ANHYDROUS 50 MCG: 100 INJECTION, POWDER, LYOPHILIZED, FOR SOLUTION INTRAVENOUS at 10:09

## 2017-09-09 RX ADMIN — POTASSIUM CHLORIDE 40 MEQ: 1500 TABLET, EXTENDED RELEASE ORAL at 10:09

## 2017-09-09 NOTE — PLAN OF CARE
Problem: Patient Care Overview  Goal: Plan of Care Review  Outcome: Outcome(s) achieved Date Met: 09/09/17  Pt following plan of care well. On my assessment, pt only appears oriented only to self. Strength equal and intact, PERRLA. Pt with intermittent c/o HA, and nausea (episode of emesis reported). Prn zofran administered x2. Poor oral intake and decreased appetite noted. IV antibiotics continued. K replenished with P.O. Tablets. SSI required as needed. Pt remained free from falls, pt transferred to chair with moderate assistance. Holley catheter d/c'd per MD orders, pt due to void at 2000. Urine specimen obtained prior to holley d/c and sent to lab for evaluation.

## 2017-09-09 NOTE — ASSESSMENT & PLAN NOTE
Assessment:     AMS: (Rapid change within a week) - Improving   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) - Considering elevated protein in CSF and improvement on acyclovir    - Other infection (UTI)     Plan:   - Fall precaution   - Follow Autoimmune encephalitis panel   - MRI brain with and without contrast   - Follow CSF labs   · ACE    · Infectious panel: arbovirus, CMV, HBV, EBV, HSV, HHV-6, HIV, AMMON Virus(PML), [place] equine, Varicella zoster, West Nile, VDRL CSF, crypto, CJD 14-3-3    · Culture: bacterial/fungal cx  · Paraneoplastic panel   - Hold Keppra   - Continue Acyclovir    - Will make a decision on Acyclovir after HSV PCR in CSF   - Case discussed with Dr Maurice  - Will follow

## 2017-09-09 NOTE — PLAN OF CARE
Problem: Occupational Therapy Goal  Goal: Occupational Therapy Goal  Goals to be met by: 9/25/17     Patient will increase functional independence with ADLs by performing:    Feeding with Set-up Assistance in upright position with little to no spilling.  UE Dressing with Set-up Assistance and Stand-by Assistance.  LE Dressing with Set-up Assistance and Moderate Assistance.  Grooming while standing with Contact Guard Assistance.  Toileting from bedside commode with Moderate Assistance for hygiene and clothing management.   Supine to sit with Minimal Assistance.  Stand pivot transfers with Contact Guard Assistance.  Toilet transfer to bedside commode with Contact Guard Assistance.    Outcome: Ongoing (interventions implemented as appropriate)  OT eval completed. The above goals are established to improve functional (I) and mobility.    KAUR Alvarez  9/9/2017  Pager: [phone]

## 2017-09-09 NOTE — ANESTHESIA RELEASE NOTE
"Anesthesia Release from PACU Note    Patient: Blue Cassidy    Procedure(s) Performed: Procedure(s) (LRB):  PUNCTURE-LUMBAR  - RADIOLOGY PERFORMED (N/A)    Anesthesia type: general    Post pain: Adequate analgesia    Post assessment: no apparent anesthetic complications    Last Vitals:   Visit Vitals  BP (!) 155/72   Pulse 71   Temp 36.4 °C (97.6 °F) (Axillary)   Resp 20   Ht 5' 4" (1.626 m)   Wt (!) 145.3 kg (320 lb 4.8 oz)   LMP  (LMP Unknown)   SpO2 100%   Breastfeeding? No   BMI 54.98 kg/m²       Post vital signs: stable    Level of consciousness: awake and alert     Nausea/Vomiting: no nausea/no vomiting    Complications: none    Airway Patency: patent    Respiratory: unassisted, spontaneous ventilation, room air    Cardiovascular: stable and blood pressure at baseline    Hydration: euvolemic  "

## 2017-09-09 NOTE — ASSESSMENT & PLAN NOTE
9/6/17:  1.2 cm area of hypodensity in the right parietal lobe without obvious cytotoxic edema, given irregular margins and confinement to white matter.  Findings most likely represent components of chronic small vessel ischemic disease.  Nonspecific hypodensity in the right cerebellar hemisphere.  MRI of the brain without and with contrast obtained for further evaluation.    9/8/17 MRI - consistent w chronic microvasc disease, and old small stroke w encephalomalasia right fromtal lobe and cerebro volume loss.

## 2017-09-09 NOTE — PROGRESS NOTES
"[facility] Hospital Medicine  Progress Note    Patient Name: Blue Cassidy  MRN: [id number]  Patient Class: IP- Inpatient   Admission Date: 9/6/2017  Length of Stay: 3 days  Attending Physician: Lien Alba MD  Primary Care Provider: Primary Doctor [facility] Medicine Team: [facility] Lien Alba MD    Subjective:     Principal Problem:Acute encephalopathy    HPI:  Ms. Cassidy is a 72yo lady with a past medical history of   She now comes as a transfer from [facility] after being admitted there on 9/4/17 with acute altered mental status with agitation.  She had a CT head done there at admission which revealed, "right parietal lobe ischemia."  She was also found to have a, "slightly abnormal UA" and was started on Rocephin 1g iv q24 hours.  She was admitted to the IM service and Neurology was consulted, who recommended an MRI brain.  This was done, but was of such a poor quality that is was un-interpretable.  The night after she was admitted she, "had two focal seizures.  The patient was placed on IV Keppra and an EEG was obtained this morning."  She also had to be given 2mg of iv haldol to control some combativeness.  Since receiving all of these therapies, she has been lethargic and unable to take anything by mouth.       Hospital Course:  9/7 - pt more alert, still not back to baseline mental status accord to family,  Neurology saw pt this am,  2 am, labs collected,  EEG ordered  9/8 - spoke w anesthesia fellow yesterday,  Place another consutlt for anesthesia today for LP and MRI under sedation,  Spoke to neurology fellow and he is going to set up procedures.  9/9- up in chair, verbal and interacting with caretakers, eating a ookie by herself.  LP + protein, 270, MRI - consistent w chronic microvasc disease, and old small stroke w encephalomalasia right fromtal lobe and cerebro volume loss.   K 3.2    Interval History: woke up this am and talking, " but still confused    Review of Systems   Constitutional: Negative for chills, diaphoresis, fatigue and fever.   HENT: Negative for congestion, sinus pain and trouble swallowing.    Respiratory: Negative for cough and shortness of breath.    Cardiovascular: Negative for chest pain and leg swelling.   Gastrointestinal: Negative for abdominal distention, abdominal pain, blood in stool, constipation, diarrhea and vomiting.   Genitourinary: Negative for difficulty urinating.   Musculoskeletal: Negative for neck pain and neck stiffness.   Skin: Negative for rash.   Neurological: Negative for tremors, weakness, numbness and headaches.   Psychiatric/Behavioral: Negative for agitation, behavioral problems and confusion.     Objective:     Vital Signs (Most Recent):  Temp: 97.7 °F (36.5 °C) (09/09/17 1139)  Pulse: 78 (09/09/17 1139)  Resp: 18 (09/09/17 1139)  BP: (!) 174/73 (informed MD Alba on call with IMB) (09/09/17 1139)  SpO2: (!) 92 % (09/09/17 1139) Vital Signs (24h Range):  Temp:  [97.3 °F (36.3 °C)-98.3 °F (36.8 °C)] 97.7 °F (36.5 °C)  Pulse:  [65-78] 78  Resp:  [18-22] 18  SpO2:  [92 %-100 %] 92 %  BP: (138-188)/() 174/73     Weight: (!) 145.3 kg (320 lb 4.8 oz)  Body mass index is 54.98 kg/m².    Intake/Output Summary (Last 24 hours) at 09/09/17 1146  Last data filed at 09/09/17 0500   Gross per 24 hour   Intake             1550 ml   Output             1250 ml   Net              300 ml      Physical Exam   Constitutional: She is oriented to person, place, and time. She appears well-developed and well-nourished.   obese   HENT:   Head: Normocephalic and atraumatic.   Mouth/Throat: Oropharynx is clear and moist.   Eyes: Conjunctivae and EOM are normal. Pupils are equal, round, and reactive to light. No scleral icterus.   Neck: Normal range of motion. Neck supple. No thyromegaly present.   Cardiovascular: Normal rate, regular rhythm and normal heart sounds.    Pulmonary/Chest: Effort normal and breath sounds  normal. No respiratory distress. She has no wheezes. She has no rales.   Abdominal: Soft. Bowel sounds are normal. She exhibits no distension and no mass. There is no tenderness. There is no rebound and no guarding. No hernia.   Musculoskeletal: Normal range of motion. She exhibits edema. She exhibits no deformity.   Lymphadenopathy:     She has no cervical adenopathy.   Neurological: She is alert and oriented to person, place, and time. She has normal strength.   Psychiatric: She has a normal mood and affect. Her behavior is normal. Her speech is delayed. Cognition and memory are impaired. She exhibits abnormal recent memory and abnormal remote memory. She is attentive.       Significant Labs:   CBC:     Recent Labs  Lab 09/08/17  0525 09/09/17  0643   WBC 6.48 6.37   HGB 12.3 12.3   HCT 38.1 37.8    151     CMP:     Recent Labs  Lab 09/08/17  0525 09/09/17  0642    140   K 3.2* 3.2*    102   CO2 28 28   * 135*   BUN 16 17   CREATININE 1.2 1.6*   CALCIUM 8.8 8.9   ANIONGAP 10 10   EGFRNONAA 44.9* 31.7*       Significant Imaging: I have reviewed and interpreted all pertinent imaging results/findings within the past 24 hours.    Assessment/Plan:      * Acute encephalopathy    9/9 - LP results pending, + protein suggest possible viral etiology  9/8- atempting to set up LP and MRI under sedation with anesthesia    AMS: (Rapid change within a week)   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) - little improvement per daughters after the start of Acyclovir   - ?? Seizure   - Paraneoplastic process   - Other infection (UTI)  - on empiric acyclovir     On IVFs for hydration, npo     - Fall precaution   - Check B12, MMA, NH3, B1, B6, HIV, RPR, ESR, CRP, TPO, ROXANN  - collected  - Urine drug screen - ordered  - EEG  - ordered  - Autoimmune encephalitis panel in serum - ordered  - MRI brain with and without contrast   - LP w/ CSF analysis:   · Cell count/dif, protein, glucose,  ACE    · Infectious panel: arbovirus, CMV, HBV, EBV, HSV, HHV-6, HIV, AMMON Virus(PML), [place] equine, Varicella zoster, West Nile, VDRL CSF, crypto, CJD 14-3-3    · Culture: bacterial/fungal cx  · Paraneoplastic panel   ·   Patient need to be sedated for MRI and LP.   Would need anaesthesia help considering patient need sedation and difficult LP due to body habitus     Abnormal CT head with chronic microvascular ischemic changes  -1.2 cm area of hypodensity in the right parietal lobe without obvious cytotoxic edema, given irregular margins and confinement to white matter.    -Findings most likely represent components of chronic small vessel ischemic disease.  -Nonspecific hypodensity in the right cerebellar hemisphere.  MRI of the brain without and with contrast obtained for further evaluation        Seizure    9/9 - stable in hospital, MS somewhat improved  New onset  keppra   eeg  Neurology consulted          Type 2 diabetes mellitus with complication    Recent Labs      09/07/17   2120  09/08/17   1145  09/08/17   1313  09/08/17   1714  09/09/17   0143  09/09/17   0638   POCTGLUCOSE  103  213*  120*  235*  200*  148*     On SS          HTN (hypertension), malignant    -Start iv lopressor 5mg iv q6 hours  -Telemetry  -2D Echo with cfd    9/7 - 153/81  9/9 - stable        Hypokalemia    9/8 - kadur 40 x 2        Abnormal CT of the head    9/6/17:  1.2 cm area of hypodensity in the right parietal lobe without obvious cytotoxic edema, given irregular margins and confinement to white matter.  Findings most likely represent components of chronic small vessel ischemic disease.  Nonspecific hypodensity in the right cerebellar hemisphere.  MRI of the brain without and with contrast obtained for further evaluation.    9/8/17 MRI - consistent w chronic microvasc disease, and old small stroke w encephalomalasia right fromtal lobe and cerebro volume loss.          Urinary tract infection without hematuria    Rocephin  started  9/9 - day 3, no cultures here, UA was abnormal from outside hospital.  Will dc rocephin and reculture the urine, repeat UA.  No evidece of bacterial meinigitis          Altered mental status    Suspect seizure with viral encephalitis  keppra started              VTE Risk Mitigation         Ordered     enoxaparin injection 40 mg  Daily     Route:  Subcutaneous        09/06/17 1954     Medium Risk of VTE  Once      09/06/17 1954     Place sequential compression device  Until discontinued      09/06/17 1954     Place ROSITA hose  Until discontinued      09/06/17 1954              Lien Alba MD  Department of Hospital Medicine   [facility]

## 2017-09-09 NOTE — ANESTHESIA POSTPROCEDURE EVALUATION
"Anesthesia Post Evaluation    Patient: Blue Cassidy    Procedure(s) Performed: Procedure(s) (LRB):  PUNCTURE-LUMBAR  - RADIOLOGY PERFORMED (N/A)    Final Anesthesia Type: general  Patient location during evaluation: PACU  Patient participation: Yes- Able to Participate  Level of consciousness: awake and alert  Post-procedure vital signs: reviewed and stable  Pain management: adequate  Airway patency: patent  PONV status at discharge: No PONV  Anesthetic complications: no      Cardiovascular status: blood pressure returned to baseline and hemodynamically stable  Respiratory status: unassisted, room air and spontaneous ventilation  Hydration status: euvolemic  Follow-up not needed.        Visit Vitals  BP (!) 155/72   Pulse 71   Temp 36.4 °C (97.6 °F) (Axillary)   Resp 20   Ht 5' 4" (1.626 m)   Wt (!) 145.3 kg (320 lb 4.8 oz)   LMP  (LMP Unknown)   SpO2 100%   Breastfeeding? No   BMI 54.98 kg/m²       Pain/Raoul Score: Pain Assessment Performed: Yes (9/8/2017  7:11 PM)  Presence of Pain: denies (9/8/2017  7:11 PM)  Pain Rating Post Med Admin: 0 (9/8/2017 10:15 AM)  Raoul Score: 10 (9/8/2017  7:11 PM)      "

## 2017-09-09 NOTE — ASSESSMENT & PLAN NOTE
Recent Labs      09/07/17   2120  09/08/17   1145  09/08/17   1313  09/08/17   1714  09/09/17   0143  09/09/17   0638   POCTGLUCOSE  103  213*  120*  235*  200*  148*     On SS

## 2017-09-09 NOTE — PROGRESS NOTES
10cc balloon deflated and holley catheter discontinued per MD order. Prior to discontinuation, holley drained of 500ccs hazy, concentrated urine. Pt tolerated removal well. Pt due to void at 2000. Will continue to monitor.

## 2017-09-09 NOTE — NURSING TRANSFER
Nursing Transfer Note      9/8/2017     Transfer To: 927-A    Transfer via bed    Transfer with room air    Transported by Pct x 2    Medicines sent: Yes    Chart send with patient: Yes    Notified: daughter        Upon arrival to floor: patient oriented to room, call bell in reach and bed in lowest position

## 2017-09-09 NOTE — PLAN OF CARE
Problem: Physical Therapy Goal  Goal: Physical Therapy Goal  Goals to be met by: 2017    Patient will increase functional independence with mobility by performin. Supine to sit with MInimal Assistance  2. Sit to supine with MInimal Assistance  3. Rolling to Left and Right with Minimal Assistance.  4. Sit to stand transfer with Contact Guard Assistance  5. Bed to chair transfer with Contact Guard Assistance using Rolling Walker  6. Gait  x 25 feet with Contact Guard Assistance using Rolling Walker.     Outcome: Ongoing (interventions implemented as appropriate)  Evaluation complete. Goals appropriate to improve functional mobility.    Alphonso Jaquez III, TERRY  2017

## 2017-09-09 NOTE — PT/OT/SLP EVAL
"Occupational Therapy  Evaluation    Blue Cassidy   MRN: [id number]   Admitting Diagnosis: Acute encephalopathy    OT Date of Treatment: 09/09/17   OT Start Time: 0905  OT Stop Time: 0926  OT Total Time (min): 21 min    Billable Minutes:  Evaluation 21 minutes    Diagnosis: Acute encephalopathy   Decreased strength, endurance, balance, ROM, coordination; impaired cognition    Past Medical History:   Diagnosis Date    CHF (congestive heart failure)     COPD (chronic obstructive pulmonary disease)     CVA (cerebral vascular accident)     Diabetes     GERD (gastroesophageal reflux disease)     Gout     Hypertension     Thyroid disease     UTI (urinary tract infection)       Past Surgical History:   Procedure Laterality Date    HYSTERECTOMY      JOINT REPLACEMENT      right knee    [place] REMOVAL GALLBLADDER      Cholecystectomy    THYROID SURGERY         Referring physician: ANGI Alba  Date referred to OT: 9/9/2017    General Precautions: Standard, aspiration, fall    Do you have any cultural, spiritual, Jew conflicts, given your current situation?: none stated     Patient History:  Living Environment  Lives With: child(cierra), adult  Living Arrangements: house  Transportation Available: car, family or friend will provide  Living Environment Comment: Pt lives with daughter in a 1SH with no EDNA, with a tub/shower combo and handicapped toilet. pt uses rollator for ambulation and was (I) in ADLs. Dtr is available 24/7.  Equipment Currently Used at Home: rollator    Prior level of function:   Bed Mobility/Transfers: needs device  Grooming: independent  Bathing: independent  Upper Body Dressing: independent  Lower Body Dressing: independent  Toileting: independent  Home Management Skills: independent  Homemaking Responsibilities: Yes  Mode of Transportation: Family     Dominant hand: right    Subjective:  Communicated with RN prior to session.  "My legs hurt when I walk."  Chief Complaint: pain when moving " her legs  Patient/Family stated goals: get better    Pain/Comfort  Pain Rating 1: 0/10  Pain Rating Post-Intervention 1: 0/10    Objective:  Patient found with: holley catheter, oxygen, pt found supine with HOB elevated ~45 degrees and agreeable to therapy.    Cognitive Exam:  Oriented to: Person  Follows Commands/attention: Follows 2-step commands  Communication: mumbles, but mostly clear  Memory:  Impaired STM  Safety awareness/insight to disability: impaired  Coping skills/emotional control: Appropriate to situation    Visual/perceptual:  Unable to assess 2/2 cognition, appeared intact; pt with eyes closed for much of the session, even while sitting EOB; cues provided to open eyes, pt did not open her eyes.    Physical Exam:  Postural examination/scapula alignment: Rounded shoulder  Skin integrity: Visible skin intact  Edema: None noted     Sensation:   Intact    Upper Extremity Range of Motion:  Right Upper Extremity: WFL  Left Upper Extremity: WFL    Upper Extremity Strength:  Right Upper Extremity: 3-/5  Left Upper Extremity: 3-/5   Strength: Fair    Fine motor coordination:   Intact    Gross motor coordination: impaired    Functional Mobility:  Bed Mobility:  Supine to Sit: Moderate Assistance    Transfers:  Sit <> Stand Assistance: Minimum Assistance  Sit <> Stand Assistive Device: Rolling Walker  Bed <> Chair Technique: Stand Pivot  Bed <> Chair Transfer Assistance: Minimum Assistance  Bed <> Chair Assistive Device: Rolling Walker    Functional Ambulation: Min A stand pivot t/f with RW, EOB to reclienr 90 degrees turn to R; cues for posture, hands on walker, and safety with t/f.    Activities of Daily Living:  Feeding Level of Assistance: Set-up Assistance, Supervision (per daughter, pt fed herself; at time of eval, pt had food on her clothes, indicating she spilled food on herself)  UE Dressing Level of Assistance: Maximum assistance (gown)  LE Dressing Level of Assistance: Maximum assistance  "(socks)    Balance:   Static Sit: GOOD: Takes MODERATE challenges from all directions  Dynamic Sit: GOOD-: Maintains balance through MODERATE excursions of active trunk movement,     Static Stand: POOR+: Needs MINIMAL assist to maintain  Dynamic stand: POOR: N/A    Therapeutic Activities and Exercises:  Pt ed re OT role and POC. Pt performed supine to sit with Mod A. Pt performed strength and ROM testing. Pt requiring max A for dressing tasks. Pt performed sit to stand t/f with Min A and pivoted with RW to recliner with Min A. Pt is able to feed herself, but spills food.    AM-PAC 6 CLICK ADL  How much help from another person does this patient currently need?  1 = Unable, Total/Dependent Assistance  2 = A lot, Maximum/Moderate Assistance  3 = A little, Minimum/Contact Guard/Supervision  4 = None, Modified [place]/Independent    Putting on and taking off regular lower body clothing? : 2  Bathing (including washing, rinsing, drying)?: 2  Toileting, which includes using toilet, bedpan, or urinal? : 2  Putting on and taking off regular upper body clothing?: 2  Taking care of personal grooming such as brushing teeth?: 3  Eating meals?: 3  Total Score: 14    AM-PAC Raw Score CMS "G-Code Modifier Level of Impairment Assistance   6 % Total / Unable   7 - 9 CM 80 - 100% Maximal Assist   10-14 CL 60 - 80% Moderate Assist   15 - 19 CK 40 - 60% Moderate Assist   20 - 22 CJ 20 - 40% Minimal Assist   23 CI 1-20% SBA / CGA   24 CH 0% Independent/ Mod I       Patient left up in chair with all lines intact, call button in reach and daughter present    Assessment:  Blue Cassidy is a 71 y.o. female with a medical diagnosis of Acute encephalopathy and presents with the impairments listed below. Pt only oriented to self, but participates well. Pt requiring Mod A for bed mobility and Min A for transfers. Pt requiring increased assistance for self care at this time mostly 2/2 to cognition, but additionally because of " weakness. Pt would benefit from cont OT to improve strength and endurance required for mobility and self care, as well as to improve problem-solving and sequencing for modification of tasks with re to (I) in ADLs.    Pt evaluation falls under high complexity for evaluation category due to 5+ performance deficits identified with comprehensive assessments and significant modifications/assistance required. An expanded review of history and occupational profile completed in addition to expanded review of physical, cognitive and psychosocial history. Several treatment options considered in care.    Rehab identified problem list/impairments: Rehab identified problem list/impairments: weakness, impaired endurance, impaired cognition, impaired functional mobilty, gait instability, impaired balance, decreased coordination, decreased lower extremity function, decreased safety awareness    Rehab potential is good.    Activity tolerance: Fair    Discharge recommendations: Discharge Facility/Level Of Care Needs: nursing facility, skilled (short stay)     Barriers to discharge: Barriers to Discharge:  (increased assistance req at this time)    Equipment recommendations:  (TBD at next level of care or closer to d/c)     GOALS:    Occupational Therapy Goals        Problem: Occupational Therapy Goal    Goal Priority Disciplines Outcome Interventions   Occupational Therapy Goal     OT, PT/OT Ongoing (interventions implemented as appropriate)    Description:  Goals to be met by: 9/25/17     Patient will increase functional independence with ADLs by performing:    Feeding with Set-up Assistance in upright position with little to no spilling.  UE Dressing with Set-up Assistance and Stand-by Assistance.  LE Dressing with Set-up Assistance and Moderate Assistance.  Grooming while standing with Contact Guard Assistance.  Toileting from bedside commode with Moderate Assistance for hygiene and clothing management.   Supine to sit with  Minimal Assistance.  Stand pivot transfers with Contact Guard Assistance.  Toilet transfer to bedside commode with Contact Guard Assistance.                      PLAN:  Patient to be seen 3 x/week to address the above listed problems via therapeutic activities, self-care/home management, therapeutic exercises, neuromuscular re-education, cognitive retraining  Plan of Care expires: 10/09/17  Plan of Care reviewed with: patient, daughter    KAUR Alvarez  9/9/2017  Pager: [phone]

## 2017-09-09 NOTE — PROGRESS NOTES
[facility]  Neurology  Progress Note    Patient Name: Blue Cassidy  MRN: [id number]  Admission Date: 9/6/2017  Hospital Length of Stay: 3 days  Code Status: Full Code   Attending Provider: Lien Alba MD  Primary Care Physician: Primary Doctor No   Principal Problem:Acute encephalopathy    Interval History:  9/8/2017: No cute events overnight. No change in mental status.    9/9/2017:Much better this morning. Was sitting in chair. Following commands and recognized her daughter.      Past Medical History:   Diagnosis Date    CHF (congestive heart failure)     COPD (chronic obstructive pulmonary disease)     CVA (cerebral vascular accident)     Diabetes     GERD (gastroesophageal reflux disease)     Gout     Hypertension     Thyroid disease     UTI (urinary tract infection)        Past Surgical History:   Procedure Laterality Date    HYSTERECTOMY      JOINT REPLACEMENT      right knee    [place] REMOVAL GALLBLADDER      Cholecystectomy    THYROID SURGERY         Review of patient's allergies indicates:  No Known Allergies    Current Neurological Medications:     No current facility-administered medications on file prior to encounter.      No current outpatient prescriptions on file prior to encounter.     Family History     None        Social History Main Topics    Smoking status: Former Smoker     Years: 15.00     Types: Cigarettes    Smokeless tobacco: Former User      Comment: greater than 18 years    Alcohol use No    Drug use: No    Sexual activity: Not Currently     Review of Systems   Unable to perform ROS: Mental status change     Objective:     Vital Signs (Most Recent):  Temp: 97.7 °F (36.5 °C) (09/09/17 1139)  Pulse: 78 (09/09/17 1139)  Resp: 18 (09/09/17 1139)  BP: (!) 174/73 (informed MD Alba on call with IMB) (09/09/17 1139)  SpO2: (!) 92 % (09/09/17 1139) Vital Signs (24h Range):  Temp:  [97.3 °F (36.3 °C)-98.3 °F (36.8 °C)] 97.7 °F (36.5 °C)  Pulse:  [65-78]  78  Resp:  [18-22] 18  SpO2:  [92 %-100 %] 92 %  BP: (138-188)/() 174/73     Weight: (!) 145.3 kg (320 lb 4.8 oz)  Body mass index is 54.98 kg/m².    Physical Exam   Constitutional: She appears well-developed and well-nourished.   HENT:   Head: Normocephalic and atraumatic.   Right Ear: External ear normal.   Left Ear: External ear normal.   Eyes: Conjunctivae are normal. Pupils are equal, round, and reactive to light.   Right eye droopy    Neck: Neck supple.   Pulmonary/Chest: Effort normal and breath sounds normal.   Abdominal: Soft. Bowel sounds are normal.   Musculoskeletal: She exhibits edema and tenderness.   Neurological: She has a normal Finger-Nose-Finger Test.   Reflex Scores:       Tricep reflexes are 1+ on the right side and 1+ on the left side.       Bicep reflexes are 1+ on the right side and 1+ on the left side.       Brachioradialis reflexes are 1+ on the right side and 1+ on the left side.       Patellar reflexes are 1+ on the right side and 1+ on the left side.       Achilles reflexes are 1+ on the right side and 1+ on the left side.  Skin: Skin is warm.   Psychiatric: Her speech is slurred.       NEUROLOGICAL EXAMINATION:     MENTAL STATUS   Oriented to person.   Oriented to place.   Disoriented to time.   Attention: decreased. Concentration: decreased.   Speech: slurred   Level of consciousness: drowsy  Knowledge: poor.   Unable to name object. Unable to read. Unable to repeat.        Following simple commands and recognized her daughter. Much better      CRANIAL NERVES     CN II   Visual fields full to confrontation.     CN III, IV, VI   Pupils are equal, round, and reactive to light.  CN III: no CN III palsy  CN VI: no CN VI palsy  Nystagmus: none   Diplopia: none  Ophthalmoparesis: none    CN V   Facial sensation intact.     CN VII   Facial expression full, symmetric.     CN IX, X   CN IX normal.     CN XI   CN XI normal.     CN XII   CN XII normal.     MOTOR EXAM   Muscle bulk:  normal  Overall muscle tone: normal       Moving all her extremities spontaneously. Sitting in chair this morning      REFLEXES     Reflexes   Right brachioradialis: 1+  Left brachioradialis: 1+  Right biceps: 1+  Left biceps: 1+  Right triceps: 1+  Left triceps: 1+  Right patellar: 1+  Left patellar: 1+  Right achilles: 1+  Left achilles: 1+  Right : 1+  Left : 1+  Right plantar: equivocal  Left plantar: equivocal  Right ankle clonus: absent  Left ankle clonus: absent    SENSORY EXAM   Vibration normal.        Responded to pain. Unable to check other modalities.      GAIT AND COORDINATION      Coordination   Finger to nose coordination: normal       Gait not checked. AMS unable to ambulate for last one week.        Significant Labs:   CBC:     Recent Labs  Lab 09/08/17  0525 09/09/17  0643   WBC 6.48 6.37   HGB 12.3 12.3   HCT 38.1 37.8    151     CMP:     Recent Labs  Lab 09/08/17  0525 09/09/17  0642   * 135*    140   K 3.2* 3.2*    102   CO2 28 28   BUN 16 17   CREATININE 1.2 1.6*   CALCIUM 8.8 8.9   MG 1.7 1.7   ANIONGAP 10 10   EGFRNONAA 44.9* 31.7*     Urine Culture: No results for input(s): LABURIN in the last 48 hours.    Significant Imaging:     CT Brain:  1.2 cm area of hypodensity in the right parietal lobe without obvious cytotoxic edema, given irregular margins and confinement to white matter.  Findings most likely represent components of chronic small vessel ischemic disease.  Nonspecific hypodensity in the right cerebellar hemisphere.  MRI of the brain without and with contrast obtained for further evaluation.  No evidence of intracranial hemorrhage or territorial infarction.    MRI Brain without contrast:  No evidence of acute infarction.  Markedly limited examination.         Assessment and Plan:     Altered mental status    Assessment:     AMS: (Rapid change within a week) - Improving   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) -  Considering elevated protein in CSF and improvement on acyclovir    - Other infection (UTI)     Plan:   - Fall precaution   - Follow Autoimmune encephalitis panel   - MRI brain with and without contrast   - Follow CSF labs   · ACE    · Infectious panel: arbovirus, CMV, HBV, EBV, HSV, HHV-6, HIV, AMMON Virus(PML), [place] equine, Varicella zoster, West Nile, VDRL CSF, crypto, CJD 14-3-3    · Culture: bacterial/fungal cx  · Paraneoplastic panel   - Hold Keppra   - Continue Acyclovir    - Will make a decision on Acyclovir after HSV PCR in CSF   - Case discussed with Dr Maurice  - Will follow              VTE Risk Mitigation         Ordered     enoxaparin injection 40 mg  Daily     Route:  Subcutaneous        09/06/17 1954     Medium Risk of VTE  Once      09/06/17 1954     Place sequential compression device  Until discontinued      09/06/17 1954     Place ROSITA hose  Until discontinued      09/06/17 1954          Rose Gallagher II, MD  Neurology  [facility]

## 2017-09-09 NOTE — ASSESSMENT & PLAN NOTE
Rocephin started  9/9 - day 3, no cultures here, UA was abnormal from outside hospital.  Will dc rocephin and reculture the urine, repeat UA.  No evidece of bacterial meinigitis

## 2017-09-09 NOTE — SUBJECTIVE & OBJECTIVE
Past Medical History:   Diagnosis Date    CHF (congestive heart failure)     COPD (chronic obstructive pulmonary disease)     CVA (cerebral vascular accident)     Diabetes     GERD (gastroesophageal reflux disease)     Gout     Hypertension     Thyroid disease     UTI (urinary tract infection)        Past Surgical History:   Procedure Laterality Date    HYSTERECTOMY      JOINT REPLACEMENT      right knee    [place] REMOVAL GALLBLADDER      Cholecystectomy    THYROID SURGERY         Review of patient's allergies indicates:  No Known Allergies    Current Neurological Medications:     No current facility-administered medications on file prior to encounter.      No current outpatient prescriptions on file prior to encounter.     Family History     None        Social History Main Topics    Smoking status: Former Smoker     Years: 15.00     Types: Cigarettes    Smokeless tobacco: Former User      Comment: greater than 18 years    Alcohol use No    Drug use: No    Sexual activity: Not Currently     Review of Systems   Unable to perform ROS: Mental status change     Objective:     Vital Signs (Most Recent):  Temp: 97.7 °F (36.5 °C) (09/09/17 1139)  Pulse: 78 (09/09/17 1139)  Resp: 18 (09/09/17 1139)  BP: (!) 174/73 (informed MD Alba on call with IMB) (09/09/17 1139)  SpO2: (!) 92 % (09/09/17 1139) Vital Signs (24h Range):  Temp:  [97.3 °F (36.3 °C)-98.3 °F (36.8 °C)] 97.7 °F (36.5 °C)  Pulse:  [65-78] 78  Resp:  [18-22] 18  SpO2:  [92 %-100 %] 92 %  BP: (138-188)/() 174/73     Weight: (!) 145.3 kg (320 lb 4.8 oz)  Body mass index is 54.98 kg/m².    Physical Exam   Constitutional: She appears well-developed and well-nourished.   HENT:   Head: Normocephalic and atraumatic.   Right Ear: External ear normal.   Left Ear: External ear normal.   Eyes: Conjunctivae are normal. Pupils are equal, round, and reactive to light.   Right eye droopy    Neck: Neck supple.   Pulmonary/Chest: Effort normal and breath  sounds normal.   Abdominal: Soft. Bowel sounds are normal.   Musculoskeletal: She exhibits edema and tenderness.   Neurological: She has a normal Finger-Nose-Finger Test.   Reflex Scores:       Tricep reflexes are 1+ on the right side and 1+ on the left side.       Bicep reflexes are 1+ on the right side and 1+ on the left side.       Brachioradialis reflexes are 1+ on the right side and 1+ on the left side.       Patellar reflexes are 1+ on the right side and 1+ on the left side.       Achilles reflexes are 1+ on the right side and 1+ on the left side.  Skin: Skin is warm.   Psychiatric: Her speech is slurred.       NEUROLOGICAL EXAMINATION:     MENTAL STATUS   Oriented to person.   Oriented to place.   Disoriented to time.   Attention: decreased. Concentration: decreased.   Speech: slurred   Level of consciousness: drowsy  Knowledge: poor.   Unable to name object. Unable to read. Unable to repeat.        Following simple commands and recognized her daughter. Much better      CRANIAL NERVES     CN II   Visual fields full to confrontation.     CN III, IV, VI   Pupils are equal, round, and reactive to light.  CN III: no CN III palsy  CN VI: no CN VI palsy  Nystagmus: none   Diplopia: none  Ophthalmoparesis: none    CN V   Facial sensation intact.     CN VII   Facial expression full, symmetric.     CN IX, X   CN IX normal.     CN XI   CN XI normal.     CN XII   CN XII normal.     MOTOR EXAM   Muscle bulk: normal  Overall muscle tone: normal       Moving all her extremities spontaneously. Sitting in chair this morning      REFLEXES     Reflexes   Right brachioradialis: 1+  Left brachioradialis: 1+  Right biceps: 1+  Left biceps: 1+  Right triceps: 1+  Left triceps: 1+  Right patellar: 1+  Left patellar: 1+  Right achilles: 1+  Left achilles: 1+  Right : 1+  Left : 1+  Right plantar: equivocal  Left plantar: equivocal  Right ankle clonus: absent  Left ankle clonus: absent    SENSORY EXAM   Vibration normal.         Responded to pain. Unable to check other modalities.      GAIT AND COORDINATION      Coordination   Finger to nose coordination: normal       Gait not checked. AMS unable to ambulate for last one week.        Significant Labs:   CBC:     Recent Labs  Lab 09/08/17  0525 09/09/17  0643   WBC 6.48 6.37   HGB 12.3 12.3   HCT 38.1 37.8    151     CMP:     Recent Labs  Lab 09/08/17  0525 09/09/17  0642   * 135*    140   K 3.2* 3.2*    102   CO2 28 28   BUN 16 17   CREATININE 1.2 1.6*   CALCIUM 8.8 8.9   MG 1.7 1.7   ANIONGAP 10 10   EGFRNONAA 44.9* 31.7*     Urine Culture: No results for input(s): LABURIN in the last 48 hours.    Significant Imaging:     CT Brain:  1.2 cm area of hypodensity in the right parietal lobe without obvious cytotoxic edema, given irregular margins and confinement to white matter.  Findings most likely represent components of chronic small vessel ischemic disease.  Nonspecific hypodensity in the right cerebellar hemisphere.  MRI of the brain without and with contrast obtained for further evaluation.  No evidence of intracranial hemorrhage or territorial infarction.    MRI Brain without contrast:  No evidence of acute infarction.  Markedly limited examination.

## 2017-09-09 NOTE — ASSESSMENT & PLAN NOTE
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 Seizure   - Paraneoplastic process   - Other infection (UTI)  - on empiric acyclovir     On IVFs for hydration, npo     - Fall precaution   - Check B12, MMA, NH3, B1, B6, HIV, RPR, ESR, CRP, TPO, ROXANN  - collected  - Urine drug screen - ordered  - EEG  - ordered  - Autoimmune encephalitis panel in serum - ordered  - MRI brain with and without contrast   - LP w/ CSF analysis:   · Cell count/dif, protein, glucose, ACE    · Infectious panel: arbovirus, CMV, HBV, EBV, HSV, HHV-6, HIV, AMMON Virus(PML), [place] equine, Varicella zoster, West Nile, VDRL CSF, crypto, CJD 14-3-3    · Culture: bacterial/fungal cx  · Paraneoplastic panel   ·   Patient need to be sedated for MRI and LP.   Would need anaesthesia help considering patient need sedation and difficult LP due to body habitus     Abnormal CT head with chronic microvascular ischemic changes  -1.2 cm area of hypodensity in the right parietal lobe without obvious cytotoxic edema, given irregular margins and confinement to white matter.    -Findings most likely represent components of chronic small vessel ischemic disease.  -Nonspecific hypodensity in the right cerebellar hemisphere.  MRI of the brain without and with contrast obtained for further evaluation

## 2017-09-09 NOTE — PT/OT/SLP EVAL
Physical Therapy  Evaluation    Blue Cassidy   MRN: [id number]   Admitting Diagnosis: Acute encephalopathy    PT Received On: 09/09/17  PT Start Time: 0905     PT Stop Time: 0927    PT Total Time (min): 22 min       Billable Minutes:  Evaluation 22    Diagnosis: Acute encephalopathy      Past Medical History:   Diagnosis Date    CHF (congestive heart failure)     COPD (chronic obstructive pulmonary disease)     CVA (cerebral vascular accident)     Diabetes     GERD (gastroesophageal reflux disease)     Gout     Hypertension     Thyroid disease     UTI (urinary tract infection)       Past Surgical History:   Procedure Laterality Date    HYSTERECTOMY      JOINT REPLACEMENT      right knee    [place] REMOVAL GALLBLADDER      Cholecystectomy    THYROID SURGERY         Referring physician: Parth  Date referred to PT: 9/8/2017    General Precautions: Standard, aspiration, fall  Orthopedic Precautions: N/A   Braces: N/A       Do you have any cultural, spiritual, Adventism conflicts, given your current situation?: none stated    Patient History:  Lives With: child(cierra), adult  Living Arrangements: house  Home Accessibility:  (no concerns)  Transportation Available: family or friend will provide  Living Environment Comment: Patient lives with daughter in 1-story home no EDNA. Tub/shower combo and handicapped toilet. Rollator/SPC for ambulation and Independent with ADLs including driving PTA. Patient with fall and mental confusion x 1 week. Daughter to assist upon discharge.  Equipment Currently Used at Home: rollator, cane, straight  DME owned (not currently used): none    Previous Level of Function:  Ambulation Skills: independent  Transfer Skills: independent  ADL Skills: independent  Work/Leisure Activity: independent (driving)    Subjective:  Communicated with RN prior to session.  Patient agreeable to PT session. Daughter present. Patient oriented to self only. Able to follow mult-step commands  Chief  Complaint: L knee pain  Patient goals: return home    Pain/Comfort  Pain Rating 1:  (reports L knee pain but does not rate with VAS)      Objective:   Patient found with: oxygen, holley catheter     Cognitive Exam:  Oriented to: Person and birthday only    Follows Commands/attention: Follows multistep  commands  Communication: clear/fluent  Safety awareness/insight to disability: impaired    Physical Exam:  Postural examination/scapula alignment: Rounded shoulder, Head forward and Posterior pelvic tilt    Skin integrity: Visible skin intact  Edema: None noted     Sensation:   Intact to light touch BLE    Lower Extremity Range of Motion:  Right Lower Extremity: WFL  Left Lower Extremity: WFL    Lower Extremity Strength:  Right Lower Extremity: WFL  Left Lower Extremity: WFL     Gross motor coordination: WFL    Functional Mobility:  Bed Mobility:  Rolling/Turning Right: Moderate assistance  Scooting/Bridging: Moderate Assistance  Supine to Sit: Moderate Assistance  Sit to Supine:  (not observed; patient left up in bedside chair. Daughter present. RN notified)    Transfers:  Sit <> Stand Assistance: Minimum Assistance  Sit <> Stand Assistive Device: Rolling Walker  Bed <> Chair Technique: Stand Pivot  Bed <> Chair Assistance: Minimum Assistance  Bed <> Chair Assistive Device: Rolling Walker    Gait:   Gait Distance: 2ft to bedside chair  Assistance 1: Minimum assistance  Gait Assistive Device: Rolling walker  Gait Pattern: swing-to gait  Gait Deviation(s): decreased brii, increased time in double stance, decreased velocity of limb motion, decreased step length, decreased stride length, increased stride width, decreased swing-to-stance ratio, decreased toe-to-floor clearance, foot flat    Balance:   Static Sit: NORMAL: No deviations seen in posture held statically  Dynamic Sit: NORMAL: No deviations seen in posture held dynamically  Static Stand: FAIR: Maintains without assist but unable to take challenges  Dynamic  stand: POOR: N/A Min Assist for rolling walker management     Therapeutic Activities and Exercises:  Patient educated on role of PT/POC.    Re-oriented to place, time, and situation.    Bed mobility with Mod Assist.  Sit<>stand Min Assist using rolling walker  Stand pivot Min Assist using rolling walker  Min Assist provided for hand placement and rolling walker management during transfers    Gait 2 ft to bedside chair Min Assist for rolling walker management.  Verbal cues for bilateral LE stepping. Demonstrates understanding with increased time, and labored movement.    Educated on the importance of participation with therapy. Verbalized understanding. Needs reinforcement.     White board updated: safe to transfer with RN staff    AM-PAC 6 CLICK MOBILITY  How much help from another person does this patient currently need?   1 = Unable, Total/Dependent Assistance  2 = A lot, Maximum/Moderate Assistance  3 = A little, Minimum/Contact Guard/Supervision  4 = None, Modified [place]/Independent    Turning over in bed (including adjusting bedclothes, sheets and blankets)?: 2  Sitting down on and standing up from a chair with arms (e.g., wheelchair, bedside commode, etc.): 3  Moving from lying on back to sitting on the side of the bed?: 2  Moving to and from a bed to a chair (including a wheelchair)?: 3  Need to walk in hospital room?: 2  Climbing 3-5 steps with a railing?: 2  Total Score: 14     AM-PAC Raw Score CMS G-Code Modifier Level of Impairment Assistance   6 % Total / Unable   7 - 9 CM 80 - 100% Maximal Assist   10 - 14 CL 60 - 80% Moderate Assist   15 - 19 CK 40 - 60% Moderate Assist   20 - 22 CJ 20 - 40% Minimal Assist   23 CI 1-20% SBA / CGA   24 CH 0% Independent/ Mod I     Patient left up in chair with all lines intact, call button in reach, RN notified and daughter present.    Assessment:   Blue Cassidy is a 71 y.o. female with a medical diagnosis of Acute encephalopathy and presents with  impaired cognition, generalized weakness, impaired endurance, impaired dynamic balance, gait instability, and decreased safety awareness limiting functional mobility. Bed mobility with Mod Assist. Sit<>Stand and stand pivot transfer to bedside chair with Min Assist for hand placement and rolling walker management. Gait 2ft Min Assist to bedside chair for rolling walker management. Patient has good functional strength; however, functional mobility and safety limited by cognition and mental confusion. To benefit from continued skilled intervention to address deficits prior to transition to SNF to improve safety and overall functional mobility.    History: personal factors and/or comorbidities that impact the plan of care: acute encephalopathy, decreased caregiver support, fall risk, significant decline in functional status  Evaluation of Body Systems: cardiovascular/pulmonary, integumentary, musculoskeletal, neuromuscular, cognition/communication  Functional Outcome Tools: AMPAC, ROM, MMT  Clinical Presentation: evolving    Evaluation Complexity Level: Moderate     Rehab identified problem list/impairments: Rehab identified problem list/impairments: weakness, impaired endurance, impaired self care skills, impaired functional mobilty, gait instability, impaired balance, impaired cognition, decreased lower extremity function, decreased coordination, decreased safety awareness, pain    Rehab potential is good.    Activity tolerance: Fair    Discharge recommendations: Discharge Facility/Level Of Care Needs: nursing facility, skilled (SS)     Barriers to discharge: Barriers to Discharge: Decreased caregiver support (patient requiring increased level of assist at this time)    Equipment recommendations: Equipment Needed After Discharge:  (TBD)     GOALS:    Physical Therapy Goals        Problem: Physical Therapy Goal    Goal Priority Disciplines Outcome Goal Variances Interventions   Physical Therapy Goal     PT/OT, PT  Ongoing (interventions implemented as appropriate)     Description:  Goals to be met by: 2017    Patient will increase functional independence with mobility by performin. Supine to sit with MInimal Assistance  2. Sit to supine with MInimal Assistance  3. Rolling to Left and Right with Minimal Assistance.  4. Sit to stand transfer with Contact Guard Assistance  5. Bed to chair transfer with Contact Guard Assistance using Rolling Walker  6. Gait  x 25 feet with Contact Guard Assistance using Rolling Walker.                       PLAN:    Patient to be seen 3 x/week to address the above listed problems via gait training, therapeutic activities, neuromuscular re-education, therapeutic exercises  Plan of Care expires: 10/09/17  Plan of Care reviewed with: patient, daughter    Alphonso DING Leonid III, PT  2017

## 2017-09-10 PROBLEM — G04.90 ENCEPHALITIS: Status: ACTIVE | Noted: 2017-09-06

## 2017-09-10 PROBLEM — R33.8 ACUTE URINARY RETENTION: Status: ACTIVE | Noted: 2017-09-10

## 2017-09-10 PROBLEM — Z51.5 PALLIATIVE CARE ENCOUNTER: Status: ACTIVE | Noted: 2017-09-10

## 2017-09-10 PROBLEM — R41.82 ALTERED MENTAL STATUS: Status: RESOLVED | Noted: 2017-09-06 | Resolved: 2017-09-10

## 2017-09-10 LAB
ANION GAP SERPL CALC-SCNC: 10 MMOL/L
BACTERIA UR CULT: NO GROWTH
BUN SERPL-MCNC: 14 MG/DL
CALCIUM SERPL-MCNC: 8.8 MG/DL
CHLORIDE SERPL-SCNC: 106 MMOL/L
CO2 SERPL-SCNC: 23 MMOL/L
CREAT SERPL-MCNC: 1.5 MG/DL
EST. GFR  (AFRICAN AMERICAN): 39.6 ML/MIN/1.73 M^2
EST. GFR  (NON AFRICAN AMERICAN): 34.3 ML/MIN/1.73 M^2
GLUCOSE SERPL-MCNC: 109 MG/DL
MAGNESIUM SERPL-MCNC: 1.5 MG/DL
PHOSPHATE SERPL-MCNC: 2.5 MG/DL
POCT GLUCOSE: 139 MG/DL (ref 70–110)
POCT GLUCOSE: 147 MG/DL (ref 70–110)
POCT GLUCOSE: 160 MG/DL (ref 70–110)
POCT GLUCOSE: 164 MG/DL (ref 70–110)
POTASSIUM SERPL-SCNC: 3.8 MMOL/L
SODIUM SERPL-SCNC: 139 MMOL/L
VIT B1 SERPL-MCNC: 47 UG/L (ref 38–122)

## 2017-09-10 PROCEDURE — 25000003 PHARM REV CODE 250: Performed by: INTERNAL MEDICINE

## 2017-09-10 PROCEDURE — 83735 ASSAY OF MAGNESIUM: CPT

## 2017-09-10 PROCEDURE — 80048 BASIC METABOLIC PNL TOTAL CA: CPT

## 2017-09-10 PROCEDURE — 99233 SBSQ HOSP IP/OBS HIGH 50: CPT | Mod: ,,, | Performed by: PSYCHIATRY & NEUROLOGY

## 2017-09-10 PROCEDURE — 63600175 PHARM REV CODE 636 W HCPCS: Performed by: INTERNAL MEDICINE

## 2017-09-10 PROCEDURE — 99233 SBSQ HOSP IP/OBS HIGH 50: CPT | Mod: ,,, | Performed by: HOSPITALIST

## 2017-09-10 PROCEDURE — 25000003 PHARM REV CODE 250: Performed by: HOSPITALIST

## 2017-09-10 PROCEDURE — 11000001 HC ACUTE MED/SURG PRIVATE ROOM

## 2017-09-10 PROCEDURE — 84100 ASSAY OF PHOSPHORUS: CPT

## 2017-09-10 PROCEDURE — 25000003 PHARM REV CODE 250: Performed by: PHYSICIAN ASSISTANT

## 2017-09-10 PROCEDURE — 36415 COLL VENOUS BLD VENIPUNCTURE: CPT

## 2017-09-10 PROCEDURE — 51798 US URINE CAPACITY MEASURE: CPT

## 2017-09-10 RX ORDER — METOPROLOL TARTRATE 1 MG/ML
5 INJECTION, SOLUTION INTRAVENOUS ONCE AS NEEDED
Status: ACTIVE | OUTPATIENT
Start: 2017-09-10 | End: 2017-09-10

## 2017-09-10 RX ORDER — CARVEDILOL 12.5 MG/1
12.5 TABLET ORAL 2 TIMES DAILY
Status: DISCONTINUED | OUTPATIENT
Start: 2017-09-10 | End: 2017-09-15

## 2017-09-10 RX ORDER — LOSARTAN POTASSIUM 25 MG/1
25 TABLET ORAL DAILY
Status: DISCONTINUED | OUTPATIENT
Start: 2017-09-10 | End: 2017-09-11

## 2017-09-10 RX ORDER — CARVEDILOL 3.12 MG/1
3.12 TABLET ORAL 2 TIMES DAILY
Status: DISCONTINUED | OUTPATIENT
Start: 2017-09-10 | End: 2017-09-10

## 2017-09-10 RX ADMIN — ENOXAPARIN SODIUM 40 MG: 100 INJECTION SUBCUTANEOUS at 03:09

## 2017-09-10 RX ADMIN — ACYCLOVIR SODIUM 1500 MG: 50 INJECTION, SOLUTION INTRAVENOUS at 02:09

## 2017-09-10 RX ADMIN — LEVETIRACETAM 500 MG: 5 INJECTION INTRAVENOUS at 10:09

## 2017-09-10 RX ADMIN — LEVOTHYROXINE SODIUM 50 MCG: 50 TABLET ORAL at 05:09

## 2017-09-10 RX ADMIN — ASPIRIN 325 MG ORAL TABLET 325 MG: 325 PILL ORAL at 10:09

## 2017-09-10 RX ADMIN — CARVEDILOL 12.5 MG: 12.5 TABLET, FILM COATED ORAL at 04:09

## 2017-09-10 RX ADMIN — ACYCLOVIR SODIUM 1500 MG: 50 INJECTION, SOLUTION INTRAVENOUS at 05:09

## 2017-09-10 RX ADMIN — CARVEDILOL 3.12 MG: 3.12 TABLET, FILM COATED ORAL at 10:09

## 2017-09-10 RX ADMIN — POTASSIUM CHLORIDE 10 MEQ: 750 CAPSULE, EXTENDED RELEASE ORAL at 10:09

## 2017-09-10 RX ADMIN — LOSARTAN POTASSIUM 25 MG: 25 TABLET, FILM COATED ORAL at 10:09

## 2017-09-10 NOTE — ASSESSMENT & PLAN NOTE
Assessment:     AMS: (Rapid change within a week) - Improving from day one. No improvement compared to yesterday morning.    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) - Considering elevated protein in CSF and improvement on acyclovir    - Other infection (UTI)     Plan:   - Fall precaution   - Follow Autoimmune encephalitis panel   - Recommend 24 hour EEG   - Recommend Seroquel 50 mg HS   - Follow CSF labs   · ACE    · Infectious panel: arbovirus, CMV, HBV, EBV, HSV, HHV-6, HIV, AMMON Virus(PML), [place] equine, Varicella zoster, West Nile, VDRL CSF, crypto, CJD 14-3-3    · Culture: bacterial/fungal cx  · Paraneoplastic panel   - Dc Keppra   - Continue Acyclovir    - Will make a decision on Acyclovir after following HSV PCR in CSF   - Case discussed with Dr Maurice  - Will follow

## 2017-09-10 NOTE — SUBJECTIVE & OBJECTIVE
Interval History: woke up this am and talking, but still confused    Review of Systems   Constitutional: Negative for chills, diaphoresis, fatigue and fever.   HENT: Negative for congestion, sinus pain and trouble swallowing.    Respiratory: Negative for cough and shortness of breath.    Cardiovascular: Negative for chest pain and leg swelling.   Gastrointestinal: Negative for abdominal distention, abdominal pain, blood in stool, constipation, diarrhea and vomiting.   Genitourinary: Negative for difficulty urinating.   Musculoskeletal: Negative for neck pain and neck stiffness.   Skin: Negative for rash.   Neurological: Negative for tremors, weakness, numbness and headaches.   Psychiatric/Behavioral: Negative for agitation, behavioral problems and confusion.     Objective:     Vital Signs (Most Recent):  Temp: 98.4 °F (36.9 °C) (09/10/17 0746)  Pulse: 89 (09/10/17 0746)  Resp: 20 (09/10/17 0746)  BP: (!) 160/80 (09/10/17 0750)  SpO2: 95 % (09/10/17 0746) Vital Signs (24h Range):  Temp:  [97.7 °F (36.5 °C)-98.6 °F (37 °C)] 98.4 °F (36.9 °C)  Pulse:  [75-89] 89  Resp:  [16-20] 20  SpO2:  [92 %-98 %] 95 %  BP: (145-177)/(69-87) 160/80     Weight: (!) 145.3 kg (320 lb 4.8 oz)  Body mass index is 54.98 kg/m².    Intake/Output Summary (Last 24 hours) at 09/10/17 1106  Last data filed at 09/10/17 0600   Gross per 24 hour   Intake              600 ml   Output              500 ml   Net              100 ml      Physical Exam   Constitutional: She is oriented to person, place, and time. She appears well-developed and well-nourished.   obese   HENT:   Head: Normocephalic and atraumatic.   Mouth/Throat: Oropharynx is clear and moist.   Eyes: Conjunctivae and EOM are normal. Pupils are equal, round, and reactive to light. No scleral icterus.   Neck: Normal range of motion. Neck supple. No thyromegaly present.   Cardiovascular: Normal rate, regular rhythm and normal heart sounds.    Pulmonary/Chest: Effort normal and breath sounds  normal. No respiratory distress. She has no wheezes. She has no rales.   Abdominal: Soft. Bowel sounds are normal. She exhibits no distension and no mass. There is no tenderness. There is no rebound and no guarding. No hernia.   Musculoskeletal: Normal range of motion. She exhibits edema. She exhibits no deformity.   Lymphadenopathy:     She has no cervical adenopathy.   Neurological: She is alert and oriented to person, place, and time. She has normal strength.   Psychiatric: She has a normal mood and affect. Her behavior is normal. Her speech is delayed. Cognition and memory are impaired. She exhibits abnormal recent memory and abnormal remote memory. She is attentive.       Significant Labs:   CBC:     Recent Labs  Lab 09/09/17  0643   WBC 6.37   HGB 12.3   HCT 37.8        CMP:     Recent Labs  Lab 09/09/17  0642 09/10/17  0431    139   K 3.2* 3.8    106   CO2 28 23   * 109   BUN 17 14   CREATININE 1.6* 1.5*   CALCIUM 8.9 8.8   ANIONGAP 10 10   EGFRNONAA 31.7* 34.3*

## 2017-09-10 NOTE — PROGRESS NOTES
Bladder scanned pt again at 0600-912cc present according to scanner. Pt still confused and unable to state whether she has urinated or feels the urge to urinate, but pads are currently dry-no urinary incontinence noted. Notified MD on call, Dr. Butcher. Asked MD whether we are to replace indwelling holley or continue with In and Out cath as needed every four hours. MD states okay to hold off until primary team gets here, before performing any intervention, so that they can decide whether to use indwelling or in and out cath. Will pass along to day nurse. CARLITOS.

## 2017-09-10 NOTE — SUBJECTIVE & OBJECTIVE
Past Medical History:   Diagnosis Date    CHF (congestive heart failure)     COPD (chronic obstructive pulmonary disease)     CVA (cerebral vascular accident)     Diabetes     GERD (gastroesophageal reflux disease)     Gout     Hypertension     Thyroid disease     UTI (urinary tract infection)        Past Surgical History:   Procedure Laterality Date    HYSTERECTOMY      JOINT REPLACEMENT      right knee    [place] REMOVAL GALLBLADDER      Cholecystectomy    THYROID SURGERY         Review of patient's allergies indicates:  No Known Allergies    Current Neurological Medications:     No current facility-administered medications on file prior to encounter.      No current outpatient prescriptions on file prior to encounter.     Family History     None        Social History Main Topics    Smoking status: Former Smoker     Years: 15.00     Types: Cigarettes    Smokeless tobacco: Former User      Comment: greater than 18 years    Alcohol use No    Drug use: No    Sexual activity: Not Currently     Review of Systems   Unable to perform ROS: Mental status change     Objective:     Vital Signs (Most Recent):  Temp: 98.4 °F (36.9 °C) (09/10/17 0746)  Pulse: 89 (09/10/17 0746)  Resp: 20 (09/10/17 0746)  BP: (!) 160/80 (09/10/17 0750)  SpO2: 95 % (09/10/17 0746) Vital Signs (24h Range):  Temp:  [97.7 °F (36.5 °C)-98.6 °F (37 °C)] 98.4 °F (36.9 °C)  Pulse:  [75-89] 89  Resp:  [16-20] 20  SpO2:  [92 %-98 %] 95 %  BP: (145-177)/(69-87) 160/80     Weight: (!) 145.3 kg (320 lb 4.8 oz)  Body mass index is 54.98 kg/m².    Physical Exam   Constitutional: She appears well-developed and well-nourished.   HENT:   Head: Normocephalic and atraumatic.   Right Ear: External ear normal.   Left Ear: External ear normal.   Eyes: Conjunctivae are normal. Pupils are equal, round, and reactive to light.   Right eye droopy    Neck: Neck supple.   Pulmonary/Chest: Effort normal and breath sounds normal.   Abdominal: Soft. Bowel sounds  are normal.   Musculoskeletal: She exhibits edema and tenderness.   Neurological: She has a normal Finger-Nose-Finger Test.   Reflex Scores:       Tricep reflexes are 1+ on the right side and 1+ on the left side.       Bicep reflexes are 1+ on the right side and 1+ on the left side.       Brachioradialis reflexes are 1+ on the right side and 1+ on the left side.       Patellar reflexes are 1+ on the right side and 1+ on the left side.       Achilles reflexes are 1+ on the right side and 1+ on the left side.  Skin: Skin is warm.   Psychiatric: Her speech is slurred.       NEUROLOGICAL EXAMINATION:     MENTAL STATUS   Oriented to person.   Oriented to place.   Disoriented to time.   Attention: decreased. Concentration: decreased.   Speech: slurred   Level of consciousness: drowsy  Knowledge: poor.   Unable to name object. Unable to read. Unable to repeat.        Following simple commands and recognized her daughter. Much better      CRANIAL NERVES     CN II   Visual fields full to confrontation.     CN III, IV, VI   Pupils are equal, round, and reactive to light.  CN III: no CN III palsy  CN VI: no CN VI palsy  Nystagmus: none   Diplopia: none  Ophthalmoparesis: none    CN V   Facial sensation intact.     CN VII   Facial expression full, symmetric.     CN IX, X   CN IX normal.     CN XI   CN XI normal.     CN XII   CN XII normal.     MOTOR EXAM   Muscle bulk: normal  Overall muscle tone: normal       Moving all her extremities spontaneously. Sitting in chair this morning      REFLEXES     Reflexes   Right brachioradialis: 1+  Left brachioradialis: 1+  Right biceps: 1+  Left biceps: 1+  Right triceps: 1+  Left triceps: 1+  Right patellar: 1+  Left patellar: 1+  Right achilles: 1+  Left achilles: 1+  Right : 1+  Left : 1+  Right plantar: equivocal  Left plantar: equivocal  Right ankle clonus: absent  Left ankle clonus: absent    SENSORY EXAM   Vibration normal.        Responded to pain. Unable to check other  modalities.      GAIT AND COORDINATION      Coordination   Finger to nose coordination: normal       Gait not checked. AMS unable to ambulate for last one week.        Significant Labs:   CBC:     Recent Labs  Lab 09/09/17  0643   WBC 6.37   HGB 12.3   HCT 37.8        CMP:     Recent Labs  Lab 09/09/17  0642 09/10/17  0431   * 109    139   K 3.2* 3.8    106   CO2 28 23   BUN 17 14   CREATININE 1.6* 1.5*   CALCIUM 8.9 8.8   MG 1.7 1.5*   ANIONGAP 10 10   EGFRNONAA 31.7* 34.3*     Urine Culture: No results for input(s): LABURIN in the last 48 hours.    Significant Imaging:     CT Brain:  1.2 cm area of hypodensity in the right parietal lobe without obvious cytotoxic edema, given irregular margins and confinement to white matter.  Findings most likely represent components of chronic small vessel ischemic disease.  Nonspecific hypodensity in the right cerebellar hemisphere.  MRI of the brain without and with contrast obtained for further evaluation.  No evidence of intracranial hemorrhage or territorial infarction.    MRI Brain without contrast:  No evidence of acute infarction.  Markedly limited examination.

## 2017-09-10 NOTE — ASSESSMENT & PLAN NOTE
Non-hospice Palliative Care:   Does patient have Capacity? no  Goals- improvement of MS w treatment of encephalitis  Code Status- FULL, we discussed code status and  family contemplating change  Pain management- stable  Prognosis-  good  Family discussions-9/10 - Keron asked me about hospice.  They want it in order to get DME at home.  Pt's  did well on hospice and did not want heroic measures.  They think she has the same wishes but want to discuss with other family members.  Functional Status - up in chair w assistance, some combativeness last night, chronic indwelling holley or I&O caths needed.    Post acute care plan:  home w HH  And DME

## 2017-09-10 NOTE — PROGRESS NOTES
"[facility] Hospital Medicine  Progress Note    Patient Name: Blue Cassidy  MRN: [id number]  Patient Class: IP- Inpatient   Admission Date: 9/6/2017  Length of Stay: 4 days  Attending Physician: Lien Alba MD  Primary Care Provider: Primary Doctor [facility] Medicine Team: [facility] [place] [facility] MED B Lien Alba MD    Subjective:     Principal Problem:Acute encephalopathy    HPI:  Ms. Cassidy is a 72yo lady with a past medical history of   She now comes as a transfer from [facility] after being admitted there on 9/4/17 with acute altered mental status with agitation.  She had a CT head done there at admission which revealed, "right parietal lobe ischemia."  She was also found to have a, "slightly abnormal UA" and was started on Rocephin 1g iv q24 hours.  She was admitted to the IM service and Neurology was consulted, who recommended an MRI brain.  This was done, but was of such a poor quality that is was un-interpretable.  The night after she was admitted she, "had two focal seizures.  The patient was placed on IV Keppra and an EEG was obtained this morning."  She also had to be given 2mg of iv haldol to control some combativeness.  Since receiving all of these therapies, she has been lethargic and unable to take anything by mouth.       Hospital Course:  9/7 - pt more alert, still not back to baseline mental status accord to family,  Neurology saw pt this am,  2 am, labs collected,  EEG ordered  9/8 - spoke w anesthesia fellow yesterday,  Place another consutlt for anesthesia today for LP and MRI under sedation,  Spoke to neurology fellow and he is going to set up procedures.  9/9- up in chair, verbal and interacting with caretakers, eating a ookie by herself.  LP + protein, 270, MRI - consistent w chronic microvasc disease, and old small stroke w encephalomalasia right fromtal lobe and cerebro volume loss.   K 3.2  9/10 - developed acute urinary retention, holley " placed, discussed care of kishan Hall and Malgorzata, her family present.  Work excuse given to Malgorzata Whalen.  Pt still confused, still waitng on HSV and other lab. See palliative care note below    Interval History: woke up this am and talking, but still confused    Review of Systems   Constitutional: Negative for chills, diaphoresis, fatigue and fever.   HENT: Negative for congestion, sinus pain and trouble swallowing.    Respiratory: Negative for cough and shortness of breath.    Cardiovascular: Negative for chest pain and leg swelling.   Gastrointestinal: Negative for abdominal distention, abdominal pain, blood in stool, constipation, diarrhea and vomiting.   Genitourinary: Negative for difficulty urinating.   Musculoskeletal: Negative for neck pain and neck stiffness.   Skin: Negative for rash.   Neurological: Negative for tremors, weakness, numbness and headaches.   Psychiatric/Behavioral: Negative for agitation, behavioral problems and confusion.     Objective:     Vital Signs (Most Recent):  Temp: 98.4 °F (36.9 °C) (09/10/17 0746)  Pulse: 89 (09/10/17 0746)  Resp: 20 (09/10/17 0746)  BP: (!) 160/80 (09/10/17 0750)  SpO2: 95 % (09/10/17 0746) Vital Signs (24h Range):  Temp:  [97.7 °F (36.5 °C)-98.6 °F (37 °C)] 98.4 °F (36.9 °C)  Pulse:  [75-89] 89  Resp:  [16-20] 20  SpO2:  [92 %-98 %] 95 %  BP: (145-177)/(69-87) 160/80     Weight: (!) 145.3 kg (320 lb 4.8 oz)  Body mass index is 54.98 kg/m².    Intake/Output Summary (Last 24 hours) at 09/10/17 1106  Last data filed at 09/10/17 0600   Gross per 24 hour   Intake              600 ml   Output              500 ml   Net              100 ml      Physical Exam   Constitutional: She is oriented to person, place, and time. She appears well-developed and well-nourished.   obese   HENT:   Head: Normocephalic and atraumatic.   Mouth/Throat: Oropharynx is clear and moist.   Eyes: Conjunctivae and EOM are normal. Pupils are equal, round, and reactive to light. No scleral  icterus.   Neck: Normal range of motion. Neck supple. No thyromegaly present.   Cardiovascular: Normal rate, regular rhythm and normal heart sounds.    Pulmonary/Chest: Effort normal and breath sounds normal. No respiratory distress. She has no wheezes. She has no rales.   Abdominal: Soft. Bowel sounds are normal. She exhibits no distension and no mass. There is no tenderness. There is no rebound and no guarding. No hernia.   Musculoskeletal: Normal range of motion. She exhibits edema. She exhibits no deformity.   Lymphadenopathy:     She has no cervical adenopathy.   Neurological: She is alert and oriented to person, place, and time. She has normal strength.   Psychiatric: She has a normal mood and affect. Her behavior is normal. Her speech is delayed. Cognition and memory are impaired. She exhibits abnormal recent memory and abnormal remote memory. She is attentive.       Significant Labs:   CBC:     Recent Labs  Lab 09/09/17  0643   WBC 6.37   HGB 12.3   HCT 37.8        CMP:     Recent Labs  Lab 09/09/17  0642 09/10/17  0431    139   K 3.2* 3.8    106   CO2 28 23   * 109   BUN 17 14   CREATININE 1.6* 1.5*   CALCIUM 8.9 8.8   ANIONGAP 10 10   EGFRNONAA 31.7* 34.3*           Assessment/Plan:      * Acute encephalopathy    9/10 -  Discussed nature of brain disorder w Malgorzata and Isabel, who are asking questions.  Pt has acute encephalitis, which may improve,has evidence of mild dementia and evidence of old stroke per MRI, and active delerium presently, which may also improve.  Discussed cardiac effects of anti-psychotic agents and would use them only if behavior can't be controlled.  They intend on taking her home. MS likely to improve in a stable environment.   24 hour EEG ordered.  9/9 - LP results pending, + protein suggest possible viral etiology  9/8- atempting to set up LP and MRI under sedation with anesthesia    AMS: (Rapid change within a week)   - CNS infection (Viral vs autoimmune  (NMDA encephalitis vs other) - little improvement per daughters after the start of Acyclovir   - ?? Seizure   - Paraneoplastic process   - Other infection (UTI)  - on empiric acyclovir     On IVFs for hydration, npo     - Fall precaution   - Check B12, MMA, NH3, B1, B6, HIV, RPR, ESR, CRP, TPO, ROXANN  - collected  - Urine drug screen - ordered  - EEG  - ordered  - Autoimmune encephalitis panel in serum - ordered  - MRI brain with and without contrast   - LP w/ CSF analysis:   · Cell count/dif, protein, glucose, ACE    · Infectious panel: arbovirus, CMV, HBV, EBV, HSV, HHV-6, HIV, AMMON Virus(PML), [place] equine, Varicella zoster, West Nile, VDRL CSF, crypto, CJD 14-3-3    · Culture: bacterial/fungal cx  · Paraneoplastic panel   ·   Patient need to be sedated for MRI and LP.   Would need anaesthesia help considering patient need sedation and difficult LP due to body habitus     Abnormal CT head with chronic microvascular ischemic changes  -1.2 cm area of hypodensity in the right parietal lobe without obvious cytotoxic edema, given irregular margins and confinement to white matter.    -Findings most likely represent components of chronic small vessel ischemic disease.  -Nonspecific hypodensity in the right cerebellar hemisphere.  MRI of the brain without and with contrast obtained for further evaluation        Acute urinary retention    Jones placed  placement may improve delirium  Needs to keep it for home            Seizure    9/10 - stable in hospital, MS somewhat improved  New onset  keppra   eeg  Neurology consulted          Type 2 diabetes mellitus with complication    Recent Labs      09/09/17   0638  09/09/17   0808  09/09/17   1159  09/09/17   1702  09/09/17   2304  09/10/17   0801   POCTGLUCOSE  148*  163*  226*  188*  150*  139*     On SS          HTN (hypertension), malignant    -Start iv lopressor 5mg iv q6 hours  -Telemetry  -2D Echo with cfd    9/7 - 153/81  9/10 - stable, 158/ 70, resume coreg and  codavidar        Palliative care encounter    Non-hospice Palliative Care:   Does patient have Capacity? no  Goals- improvement of MS w treatment of encephalitis  Code Status- FULL, we discussed code status and  family contemplating change  Pain management- stable  Prognosis-  good  Family discussions-9/10 - Isabel and Malgorzata asked me about hospice.  They want it in order to get DME at home.  Pt's  did well on hospice and did not want heroic measures.  They think she has the same wishes but want to discuss with other family members.  Functional Status - up in chair w assistance, some combativeness last night, chronic indwelling holley or I&O caths needed.    Post acute care plan:  home w HH  And DME               Hypokalemia    9/8 - kadur 40 x 2        Abnormal CT of the head    9/6/17:  1.2 cm area of hypodensity in the right parietal lobe without obvious cytotoxic edema, given irregular margins and confinement to white matter.  Findings most likely represent components of chronic small vessel ischemic disease.  Nonspecific hypodensity in the right cerebellar hemisphere.  MRI of the brain without and with contrast obtained for further evaluation.    9/8/17 MRI - consistent w chronic microvasc disease, and old small stroke w encephalomalasia right fromtal lobe and cerebro volume loss.          Urinary tract infection without hematuria    Rocephin started  9/9 - day 3, no cultures here, UA was abnormal from outside hospital.  Will dc rocephin and reculture the urine, repeat UA.  No evidece of bacterial meinigitis  Needs to keep foely for chronic urinary retention.  Family can't preform I%O caths given her MS.          Altered mental status    Suspect seizure with viral encephalitis  keppra started              VTE Risk Mitigation         Ordered     enoxaparin injection 40 mg  Daily     Route:  Subcutaneous        09/06/17 1954     Medium Risk of VTE  Once      09/06/17 1954     Place sequential compression  device  Until discontinued      09/06/17 1954     Place ROSITA hose  Until discontinued      09/06/17 1954              Lien Alba MD  Department of [place] [facility]   [facility]

## 2017-09-10 NOTE — PLAN OF CARE
Problem: Patient Care Overview  Goal: Individualization & Mutuality  Outcome: Ongoing (interventions implemented as appropriate)  Pt following plan of care well. Pt continues to remain disoriented X3, oriented to self. Pt alert and easily aroused to gentle touch. Pt following commands. She does appear to have worsening periods of confusion with episodes of visual hallucinations of people not present in room. Pt denies HA or nausea this shift. Pt with ongoing decreased appetite, however intake encouraged.     Pt with hypertension to 180s/70s, pt home antihypertensive medications initiated today, B/Ps to be closely monitored.     Jones catheter replaced this shift for urinary retention. PIV access gained, for inadvertent removal by pt. IV acyclovir continued, neurology following, LP results processing. Pt pending EEG, keppra currently on hold. Seizure precautions maintained (no seizure activity noted this shift.)     AVS camera requested for this shift. Per pt family, pt with attempts to get OOB last night. Bed alarm activated. BSC at bedside. Pt tolerates transfers X2 assist, with strong guidance. No BMs this shift.     Pt weaned to RA and tolerating. BS <170, no SSI required. Barrier cream applied to right side pannus excoriated.

## 2017-09-10 NOTE — ASSESSMENT & PLAN NOTE
-Start iv lopressor 5mg iv q6 hours  -Telemetry  -2D Echo with cfd    9/7 - 153/81  9/10 - stable, 158/ 70, resume coreg and cozzar

## 2017-09-10 NOTE — PROGRESS NOTES
Spoke with MD Alba on call with IMB r/t pt still running hypertensive during this shift after administering hypertensive medications this a.m. MD verbalized to increase carvedilol from current dose to 12.5mg BID and to administer dose now. Will administer and continue to closely monitor pt.

## 2017-09-10 NOTE — PROGRESS NOTES
[facility]  Neurology  Progress Note    Patient Name: Blue Cassidy  MRN: [id number]  Admission Date: 9/6/2017  Hospital Length of Stay: 4 days  Code Status: Full Code   Attending Provider: Lien Alba MD  Primary Care Physician: Primary Doctor No   Principal Problem:Acute encephalopathy    Interval History:   9/8/2017: No cute events overnight. No change in mental status.    9/9/2017:Much better this morning. Was sitting in chair. Following commands and recognized her daughter.   9/10/2017:Much better this morning. Was sitting in chair. Following commands and recognized her daughter. Did not sleep last night and was agitated.      Past Medical History:   Diagnosis Date    CHF (congestive heart failure)     COPD (chronic obstructive pulmonary disease)     CVA (cerebral vascular accident)     Diabetes     GERD (gastroesophageal reflux disease)     Gout     Hypertension     Thyroid disease     UTI (urinary tract infection)        Past Surgical History:   Procedure Laterality Date    HYSTERECTOMY      JOINT REPLACEMENT      right knee    NE REMOVAL GALLBLADDER      Cholecystectomy    THYROID SURGERY         Review of patient's allergies indicates:  No Known Allergies    Current Neurological Medications:     No current facility-administered medications on file prior to encounter.      No current outpatient prescriptions on file prior to encounter.     Family History     None        Social History Main Topics    Smoking status: Former Smoker     Years: 15.00     Types: Cigarettes    Smokeless tobacco: Former User      Comment: greater than 18 years    Alcohol use No    Drug use: No    Sexual activity: Not Currently     Review of Systems   Unable to perform ROS: Mental status change     Objective:     Vital Signs (Most Recent):  Temp: 98.4 °F (36.9 °C) (09/10/17 0746)  Pulse: 89 (09/10/17 0746)  Resp: 20 (09/10/17 0746)  BP: (!) 160/80 (09/10/17 0750)  SpO2: 95 % (09/10/17  0746) Vital Signs (24h Range):  Temp:  [97.7 °F (36.5 °C)-98.6 °F (37 °C)] 98.4 °F (36.9 °C)  Pulse:  [75-89] 89  Resp:  [16-20] 20  SpO2:  [92 %-98 %] 95 %  BP: (145-177)/(69-87) 160/80     Weight: (!) 145.3 kg (320 lb 4.8 oz)  Body mass index is 54.98 kg/m².    Physical Exam   Constitutional: She appears well-developed and well-nourished.   HENT:   Head: Normocephalic and atraumatic.   Right Ear: External ear normal.   Left Ear: External ear normal.   Eyes: Conjunctivae are normal. Pupils are equal, round, and reactive to light.   Right eye droopy    Neck: Neck supple.   Pulmonary/Chest: Effort normal and breath sounds normal.   Abdominal: Soft. Bowel sounds are normal.   Musculoskeletal: She exhibits edema and tenderness.   Neurological: She has a normal Finger-Nose-Finger Test.   Reflex Scores:       Tricep reflexes are 1+ on the right side and 1+ on the left side.       Bicep reflexes are 1+ on the right side and 1+ on the left side.       Brachioradialis reflexes are 1+ on the right side and 1+ on the left side.       Patellar reflexes are 1+ on the right side and 1+ on the left side.       Achilles reflexes are 1+ on the right side and 1+ on the left side.  Skin: Skin is warm.   Psychiatric: Her speech is slurred.       NEUROLOGICAL EXAMINATION:     MENTAL STATUS   Oriented to person.   Oriented to place.   Disoriented to time.   Attention: decreased. Concentration: decreased.   Speech: slurred   Level of consciousness: drowsy  Knowledge: poor.   Unable to name object. Unable to read. Unable to repeat.        Following simple commands and recognized her daughter. Much better      CRANIAL NERVES     CN II   Visual fields full to confrontation.     CN III, IV, VI   Pupils are equal, round, and reactive to light.  CN III: no CN III palsy  CN VI: no CN VI palsy  Nystagmus: none   Diplopia: none  Ophthalmoparesis: none    CN V   Facial sensation intact.     CN VII   Facial expression full, symmetric.     CN IX, X    CN IX normal.     CN XI   CN XI normal.     CN XII   CN XII normal.     MOTOR EXAM   Muscle bulk: normal  Overall muscle tone: normal       Moving all her extremities spontaneously. Sitting in chair this morning      REFLEXES     Reflexes   Right brachioradialis: 1+  Left brachioradialis: 1+  Right biceps: 1+  Left biceps: 1+  Right triceps: 1+  Left triceps: 1+  Right patellar: 1+  Left patellar: 1+  Right achilles: 1+  Left achilles: 1+  Right : 1+  Left : 1+  Right plantar: equivocal  Left plantar: equivocal  Right ankle clonus: absent  Left ankle clonus: absent    SENSORY EXAM   Vibration normal.        Responded to pain. Unable to check other modalities.      GAIT AND COORDINATION      Coordination   Finger to nose coordination: normal       Gait not checked. AMS unable to ambulate for last one week.        Significant Labs:   CBC:     Recent Labs  Lab 09/09/17  0643   WBC 6.37   HGB 12.3   HCT 37.8        CMP:     Recent Labs  Lab 09/09/17  0642 09/10/17  0431   * 109    139   K 3.2* 3.8    106   CO2 28 23   BUN 17 14   CREATININE 1.6* 1.5*   CALCIUM 8.9 8.8   MG 1.7 1.5*   ANIONGAP 10 10   EGFRNONAA 31.7* 34.3*     Urine Culture: No results for input(s): LABURIN in the last 48 hours.    Significant Imaging:     CT Brain:  1.2 cm area of hypodensity in the right parietal lobe without obvious cytotoxic edema, given irregular margins and confinement to white matter.  Findings most likely represent components of chronic small vessel ischemic disease.  Nonspecific hypodensity in the right cerebellar hemisphere.  MRI of the brain without and with contrast obtained for further evaluation.  No evidence of intracranial hemorrhage or territorial infarction.    MRI Brain without contrast:  No evidence of acute infarction.  Markedly limited examination.         Assessment and Plan:     Altered mental status    Assessment:     AMS: (Rapid change within a week) - Improving from day one.  No improvement compared to yesterday morning.    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) - Considering elevated protein in CSF and improvement on acyclovir    - Other infection (UTI)     Plan:   - Fall precaution   - Follow Autoimmune encephalitis panel   - Recommend 24 hour EEG   - Recommend Seroquel 50 mg HS   - Follow CSF labs   · ACE    · Infectious panel: arbovirus, CMV, HBV, EBV, HSV, HHV-6, HIV, AMMON Virus(PML), [place] equine, Varicella zoster, West Nile, VDRL CSF, crypto, CJD 14-3-3    · Culture: bacterial/fungal cx  · Paraneoplastic panel   - Dc Keppra   - Continue Acyclovir    - Will make a decision on Acyclovir after following HSV PCR in CSF   - Case discussed with Dr Maurice  - Will follow              VTE Risk Mitigation         Ordered     enoxaparin injection 40 mg  Daily     Route:  Subcutaneous        09/06/17 1954     Medium Risk of VTE  Once      09/06/17 1954     Place sequential compression device  Until discontinued      09/06/17 1954     Place ROSITA hose  Until discontinued      09/06/17 1954          Rose Gallagher II, MD  Neurology  [facility]

## 2017-09-10 NOTE — ASSESSMENT & PLAN NOTE
Recent Labs      09/09/17   0638  09/09/17   0808  09/09/17   1159  09/09/17   1702  09/09/17   2304  09/10/17   0801   POCTGLUCOSE  148*  163*  226*  188*  150*  139*     On SS

## 2017-09-10 NOTE — PROGRESS NOTES
Noted that pt has not urinated this shift or since 1400 when holley was removed on 9/9. Pads do not appear to be wet and bladder scan shows 267cc.   Also, pt's bp elevated this shift with sys in 170's. No s/sx htn noted. Pt resting quietly in bed. Notified MD on call, Dr. Sourav Banks, who states to perform in and out cath as ordered; will do so. No other orders at this time. TM

## 2017-09-10 NOTE — ASSESSMENT & PLAN NOTE
9/10 -  Discussed nature of brain disorder w Malgorzata and Isabel, who are asking questions.  Pt has acute encephalitis, which may improve,has evidence of mild dementia and evidence of old stroke per MRI, and active delerium presently, which may also improve.  Discussed cardiac effects of anti-psychotic agents and would use them only if behavior can't be controlled.  They intend on taking her home. MS likely to improve in a stable environment.   24 hour EEG ordered.  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 Seizure   - Paraneoplastic process   - Other infection (UTI)  - on empiric acyclovir     On IVFs for hydration, npo     - Fall precaution   - Check B12, MMA, NH3, B1, B6, HIV, RPR, ESR, CRP, TPO, ROXANN  - collected  - Urine drug screen - ordered  - EEG  - ordered  - Autoimmune encephalitis panel in serum - ordered  - MRI brain with and without contrast   - LP w/ CSF analysis:   · Cell count/dif, protein, glucose, ACE    · Infectious panel: arbovirus, CMV, HBV, EBV, HSV, HHV-6, HIV, AMMON Virus(PML), [place] equine, Varicella zoster, West Nile, VDRL CSF, crypto, CJD 14-3-3    · Culture: bacterial/fungal cx  · Paraneoplastic panel   ·   Patient need to be sedated for MRI and LP.   Would need anaesthesia help considering patient need sedation and difficult LP due to body habitus     Abnormal CT head with chronic microvascular ischemic changes  -1.2 cm area of hypodensity in the right parietal lobe without obvious cytotoxic edema, given irregular margins and confinement to white matter.    -Findings most likely represent components of chronic small vessel ischemic disease.  -Nonspecific hypodensity in the right cerebellar hemisphere.  MRI of the brain  without and with contrast obtained for further evaluation

## 2017-09-10 NOTE — PROGRESS NOTES
Attempted to page Dr. Alba on call with IMB to clarify orders for EEG that was placed as ambulatory order. No call back. Will leave note in Sticky Note and sign off to evening RN to be addressed tomorrow morning. Family earlier informed that pt would be administered a medication that could potentially promote sleep. Nothing ordered, unable to speak with MD. AVS camera requested for pt safety. CN to receive camera for bedside.

## 2017-09-10 NOTE — PROGRESS NOTES
IV inadvertently removed per pt. Catheter out yet intact. Dressing removed, dressing applied. Outstanding holley cathter ordered.   Pt voiced urge to urinate and defecate. Pt assisted to BSC X3, however unable to use BR after several minutes. Pt assisted back to bed.     16FR Holley cathter placed using sterile technique. Pt tolerated well, clear yellow urine noted to tubing. 10 cc balloon inflated, remaining cathter pulled taut and secured to left upper thigh. Will continue to monitor.    New PIV access gained to right a/c. New dressing applied.

## 2017-09-10 NOTE — PROGRESS NOTES
Spoke with Dr Alba on call w/ IMB r/t pt hypertensive 1702/80s at this time, though pt asymptomatic at this time. Pt takes home antihypertensives, which currently are not being administered. MD to review at later at this time. Currently no new orders. Will continue to closely monitor pt.

## 2017-09-10 NOTE — PLAN OF CARE
Date: 09/10/2017      Patient Name: Blue Cassidy  YOB: 1945  [address]   [place] [address]          Hospital Medicine Dept.  [facility] [address] [place] [id number] [phone] after hours  [phone] fax Work excuse for Malgorzata Whalen:       Blue Cassidy has been hospitalized at the [facility] since 9/6/2017.  Please excuse the patient's relative  from work duties.  Patient may return after the patient is discharged.  Patient likely to be hospitalized another 2 to 3 days.   No restrictions.     Please contact me if you have any questions.                    __________________________  Lien Alba MD  09/10/2017

## 2017-09-11 LAB
ANION GAP SERPL CALC-SCNC: 10 MMOL/L
BACTERIA #/AREA URNS AUTO: ABNORMAL /HPF
BACTERIA BLD CULT: NORMAL
BACTERIA BLD CULT: NORMAL
BASOPHILS # BLD AUTO: 0.02 K/UL
BASOPHILS NFR BLD: 0.4 %
BILIRUB UR QL STRIP: NEGATIVE
BUN SERPL-MCNC: 11 MG/DL
CALCIUM SERPL-MCNC: 9.2 MG/DL
CHLORIDE SERPL-SCNC: 104 MMOL/L
CLARITY UR REFRACT.AUTO: ABNORMAL
CO2 SERPL-SCNC: 29 MMOL/L
COLOR UR AUTO: YELLOW
CREAT SERPL-MCNC: 1.4 MG/DL
DIFFERENTIAL METHOD: ABNORMAL
EOSINOPHIL # BLD AUTO: 0.2 K/UL
EOSINOPHIL NFR BLD: 3 %
ERYTHROCYTE [DISTWIDTH] IN BLOOD BY AUTOMATED COUNT: 13.5 %
EST. GFR  (AFRICAN AMERICAN): 43 ML/MIN/1.73 M^2
EST. GFR  (NON AFRICAN AMERICAN): 37.3 ML/MIN/1.73 M^2
GLUCOSE SERPL-MCNC: 132 MG/DL
GLUCOSE UR QL STRIP: NEGATIVE
HCT VFR BLD AUTO: 39.3 %
HGB BLD-MCNC: 12.9 G/DL
HGB UR QL STRIP: ABNORMAL
HIV 1+2 AB+HIV1 P24 AG SERPL QL IA: NEGATIVE
KETONES UR QL STRIP: NEGATIVE
LEUKOCYTE ESTERASE UR QL STRIP: ABNORMAL
LYMPHOCYTES # BLD AUTO: 0.9 K/UL
LYMPHOCYTES NFR BLD: 17.5 %
MAGNESIUM SERPL-MCNC: 1.4 MG/DL
MCH RBC QN AUTO: 29.2 PG
MCHC RBC AUTO-ENTMCNC: 32.8 G/DL
MCV RBC AUTO: 89 FL
MICROSCOPIC COMMENT: ABNORMAL
MONOCYTES # BLD AUTO: 0.9 K/UL
MONOCYTES NFR BLD: 16.7 %
NEUTROPHILS # BLD AUTO: 3.3 K/UL
NEUTROPHILS NFR BLD: 61.6 %
NITRITE UR QL STRIP: NEGATIVE
PH UR STRIP: 5 [PH] (ref 5–8)
PHOSPHATE SERPL-MCNC: 2.1 MG/DL
PLATELET # BLD AUTO: 145 K/UL
PMV BLD AUTO: 10.8 FL
POCT GLUCOSE: 103 MG/DL (ref 70–110)
POCT GLUCOSE: 147 MG/DL (ref 70–110)
POCT GLUCOSE: 151 MG/DL (ref 70–110)
POCT GLUCOSE: 190 MG/DL (ref 70–110)
POTASSIUM SERPL-SCNC: 3.9 MMOL/L
PROT UR QL STRIP: NEGATIVE
PYRIDOXAL SERPL-MCNC: 3 UG/L (ref 5–50)
RBC # BLD AUTO: 4.42 M/UL
RBC #/AREA URNS AUTO: 19 /HPF (ref 0–4)
SODIUM SERPL-SCNC: 143 MMOL/L
SP GR UR STRIP: 1.01 (ref 1–1.03)
SQUAMOUS #/AREA URNS AUTO: 30 /HPF
URN SPEC COLLECT METH UR: ABNORMAL
UROBILINOGEN UR STRIP-ACNC: NEGATIVE EU/DL
WBC # BLD AUTO: 5.32 K/UL
WBC #/AREA URNS AUTO: 6 /HPF (ref 0–5)

## 2017-09-11 PROCEDURE — 63600175 PHARM REV CODE 636 W HCPCS: Performed by: INTERNAL MEDICINE

## 2017-09-11 PROCEDURE — 85025 COMPLETE CBC W/AUTO DIFF WBC: CPT

## 2017-09-11 PROCEDURE — 97530 THERAPEUTIC ACTIVITIES: CPT | Performed by: PHYSICAL THERAPIST

## 2017-09-11 PROCEDURE — 87086 URINE CULTURE/COLONY COUNT: CPT

## 2017-09-11 PROCEDURE — 83735 ASSAY OF MAGNESIUM: CPT

## 2017-09-11 PROCEDURE — 80048 BASIC METABOLIC PNL TOTAL CA: CPT

## 2017-09-11 PROCEDURE — 97110 THERAPEUTIC EXERCISES: CPT | Performed by: PHYSICAL THERAPIST

## 2017-09-11 PROCEDURE — 25000003 PHARM REV CODE 250: Performed by: HOSPITALIST

## 2017-09-11 PROCEDURE — 25000003 PHARM REV CODE 250: Performed by: PHYSICIAN ASSISTANT

## 2017-09-11 PROCEDURE — 63600175 PHARM REV CODE 636 W HCPCS: Performed by: HOSPITALIST

## 2017-09-11 PROCEDURE — 11000001 HC ACUTE MED/SURG PRIVATE ROOM

## 2017-09-11 PROCEDURE — 84100 ASSAY OF PHOSPHORUS: CPT

## 2017-09-11 PROCEDURE — 36415 COLL VENOUS BLD VENIPUNCTURE: CPT

## 2017-09-11 PROCEDURE — 99233 SBSQ HOSP IP/OBS HIGH 50: CPT | Mod: ,,, | Performed by: PSYCHIATRY & NEUROLOGY

## 2017-09-11 PROCEDURE — 97535 SELF CARE MNGMENT TRAINING: CPT

## 2017-09-11 PROCEDURE — 92526 ORAL FUNCTION THERAPY: CPT

## 2017-09-11 PROCEDURE — 99233 SBSQ HOSP IP/OBS HIGH 50: CPT | Mod: ,,, | Performed by: HOSPITALIST

## 2017-09-11 PROCEDURE — 81001 URINALYSIS AUTO W/SCOPE: CPT

## 2017-09-11 RX ORDER — LOSARTAN POTASSIUM 50 MG/1
50 TABLET ORAL DAILY
Status: DISCONTINUED | OUTPATIENT
Start: 2017-09-11 | End: 2017-09-14

## 2017-09-11 RX ORDER — QUETIAPINE FUMARATE 25 MG/1
25 TABLET, FILM COATED ORAL NIGHTLY PRN
Status: DISCONTINUED | OUTPATIENT
Start: 2017-09-11 | End: 2017-09-18 | Stop reason: HOSPADM

## 2017-09-11 RX ORDER — MAGNESIUM SULFATE HEPTAHYDRATE 40 MG/ML
2 INJECTION, SOLUTION INTRAVENOUS ONCE
Status: COMPLETED | OUTPATIENT
Start: 2017-09-11 | End: 2017-09-11

## 2017-09-11 RX ADMIN — ACYCLOVIR SODIUM 1500 MG: 50 INJECTION, SOLUTION INTRAVENOUS at 04:09

## 2017-09-11 RX ADMIN — ACYCLOVIR SODIUM 1500 MG: 50 INJECTION, SOLUTION INTRAVENOUS at 01:09

## 2017-09-11 RX ADMIN — LEVOTHYROXINE SODIUM 50 MCG: 50 TABLET ORAL at 06:09

## 2017-09-11 RX ADMIN — CARVEDILOL 12.5 MG: 12.5 TABLET, FILM COATED ORAL at 09:09

## 2017-09-11 RX ADMIN — MAGNESIUM SULFATE IN WATER 2 G: 40 INJECTION, SOLUTION INTRAVENOUS at 09:09

## 2017-09-11 RX ADMIN — ASPIRIN 325 MG ORAL TABLET 325 MG: 325 PILL ORAL at 09:09

## 2017-09-11 RX ADMIN — POTASSIUM CHLORIDE 10 MEQ: 750 CAPSULE, EXTENDED RELEASE ORAL at 09:09

## 2017-09-11 RX ADMIN — ENOXAPARIN SODIUM 40 MG: 100 INJECTION SUBCUTANEOUS at 05:09

## 2017-09-11 RX ADMIN — LOSARTAN POTASSIUM 50 MG: 50 TABLET, FILM COATED ORAL at 09:09

## 2017-09-11 RX ADMIN — CARVEDILOL 12.5 MG: 12.5 TABLET, FILM COATED ORAL at 08:09

## 2017-09-11 NOTE — ASSESSMENT & PLAN NOTE
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 Add prn seoquel q HS for agitation.   9/10 -  Discussed nature of brain disorder w Malgorzata and Isabel, who are asking questions.  Pt has acute encephalitis, which may improve,has evidence of mild dementia and evidence of old stroke per MRI, and active delerium presently, which may also improve.  Discussed cardiac effects of anti-psychotic agents and would use them only if behavior can't be controlled.  They intend on taking her home. MS likely to improve in a stable environment.   24 hour EEG ordered.  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 Seizure upon admit  - Paraneoplastic process   - Other infection (UTI)  - on empiric acyclovir     On IVFs for hydration, npo     - Fall precaution   - Check B12, MMA, NH3, B1, B6, HIV, RPR, ESR, CRP, TPO, ROXANN  - collected  - Urine drug screen - ordered  - EEG  - ordered  - Autoimmune encephalitis panel in serum - ordered  - MRI brain with and without contrast   - LP w/ CSF analysis:   · Cell count/dif, protein, glucose, ACE    · Infectious panel: arbovirus, CMV, HBV, EBV, HSV, HHV-6, HIV, AMMON Virus(PML), [place] equine, Varicella zoster, West Nile, VDRL CSF, crypto, CJD 14-3-3    · Culture: bacterial/fungal cx  · Paraneoplastic panel   ·   Patient need to be sedated for MRI and LP.   Would need anaesthesia help considering patient need sedation and difficult LP due to body habitus     Abnormal CT head with chronic microvascular ischemic changes  -1.2 cm area of hypodensity in the right parietal lobe without obvious cytotoxic edema, given irregular  margins and confinement to white matter.    -Findings most likely represent components of chronic small vessel ischemic disease.  -Nonspecific hypodensity in the right cerebellar hemisphere.  MRI of the brain without and with contrast obtained for further evaluation

## 2017-09-11 NOTE — ASSESSMENT & PLAN NOTE
Holley placed  placement may improve delirium  Needs to keep it for home  Discussed management of holley w several family members - can do a voiding trial with monthly holley changes.

## 2017-09-11 NOTE — ASSESSMENT & PLAN NOTE
Recent Labs      09/10/17   0801  09/10/17   1225  09/10/17   1653  09/10/17   2101  09/11/17   0730  09/11/17   1139   POCTGLUCOSE  139*  164*  147*  160*  103  190*     On SS

## 2017-09-11 NOTE — ASSESSMENT & PLAN NOTE
Rocephin started  9/9 - day 3, no cultures here, UA was abnormal from outside hospital.  Will dc rocephin and reculture the urine, repeat UA.  No evidece of bacterial meinigitis  Needs to keep foely for chronic urinary retention.  Family can't preform I&O caths given her MS.  Voiding trial monthly w holley changes.

## 2017-09-11 NOTE — PLAN OF CARE
Discharge planning: Spoke with patient's daughter in room about recommendation for snf.  She asks that CM speak with her sister Rebecca on phone about options. List of facilities near patient's home([place])  reviewed over phone and she does not like the quality of care provided in those facilities. She is interested in [facility] and also would like to explore options in [place]. She will be come to hospital tomorrow to further discuss. Referral to [facility] placed in [facility].

## 2017-09-11 NOTE — SUBJECTIVE & OBJECTIVE
Subjective:     Interval History:   Daughter reports trouble sleeping last night and somnolence this morning  Awaiting EEG hookup and viral studies in CSF to result  Daughter feels mental status continues to fluctuate regardless of acyclovir    Current Facility-Administered Medications   Medication Dose Route Frequency Provider Last Rate Last Dose    acetaminophen tablet 650 mg  650 mg Oral Q6H PRN Kris Banks PA-C        acyclovir (ZOVIRAX) 1,500 mg in dextrose 5 % 250 mL IVPB  10 mg/kg Intravenous Q12H Lien Alba  mL/hr at 09/11/17 0134 1,500 mg at 09/11/17 0134    albuterol-ipratropium 2.5mg-0.5mg/3mL nebulizer solution 3 mL  3 mL Nebulization Q4H PRN CATHLEEN Larios MD        aspirin tablet 325 mg  325 mg Oral Daily Lien Alba MD   325 mg at 09/11/17 0900    carvedilol tablet 12.5 mg  12.5 mg Oral BID Lien Alba MD   12.5 mg at 09/11/17 0914    dextrose 50% injection 12.5 g  12.5 g Intravenous PRN W. Karthik Larios MD        enoxaparin injection 40 mg  40 mg Subcutaneous Daily WSamantha Larios MD   40 mg at 09/10/17 1550    glucagon (human recombinant) injection 1 mg  1 mg Intramuscular PRN WSamantha Larios MD        insulin aspart pen 0-5 Units  0-5 Units Subcutaneous Q6H PRN CATHLEEN Larios MD   2 Units at 09/09/17 1230    levothyroxine tablet 50 mcg  50 mcg Oral Before breakfast Lien Alba MD   50 mcg at 09/11/17 0618    lorazepam injection 2 mg  2 mg Intravenous Q4H PRN WSamantha Larios MD        losartan tablet 50 mg  50 mg Oral Daily Lien Alba MD   50 mg at 09/11/17 0914    midazolam injection 1 mg  1 mg Intravenous Q4H PRN CATHLEEN Larios MD   1 mg at 09/08/17 1442    midazolam injection 1 mg  1 mg Intravenous Once Kris Banks PA-C   Stopped at 09/07/17 0200    ondansetron injection 4 mg  4 mg Intravenous Q6H PRN CATHLEEN Larios MD   4 mg at 09/09/17 1416    potassium chloride CR capsule 10 mEq  10 mEq Oral Daily Kris Banks PA-C   10 mEq  "at 09/11/17 0914    quetiapine tablet 25 mg  25 mg Oral Nightly PRN Lien Alba MD         Review of Systems   Constitutional: Positive for fatigue.   Neurological: Negative for headaches.   Psychiatric/Behavioral: Positive for confusion, decreased concentration and sleep disturbance.     Objective:     Vital Signs (Most Recent):  Temp: 97.8 °F (36.6 °C) (09/11/17 1227)  Pulse: 73 (09/11/17 1227)  Resp: 20 (09/11/17 1227)  BP: (!) 179/85 (09/11/17 1227)  SpO2: 96 % (09/11/17 1227) Vital Signs (24h Range):  Temp:  [97.8 °F (36.6 °C)-98.5 °F (36.9 °C)] 97.8 °F (36.6 °C)  Pulse:  [71-83] 73  Resp:  [18-20] 20  SpO2:  [93 %-98 %] 96 %  BP: (142-198)/(77-98) 179/85     Weight: (!) 145 kg (319 lb 10.7 oz)  Body mass index is 54.87 kg/m².    Physical Exam   Constitutional: No distress.   Sitting up on side of bed working with therapy   HENT:   Head: Normocephalic and atraumatic.   Eyes: Conjunctivae are normal. Pupils are equal, round, and reactive to light.   Neck: Neck supple.   Pulmonary/Chest: Effort normal.   Skin: She is not diaphoretic.   Psychiatric: Her speech is slurred.     NEUROLOGICAL EXAMINATION:     MENTAL STATUS   Oriented to person.   Disoriented to place. (Knows [place] but thought we were in the clinic not hospital)  Disoriented to time.   Follows 1 step (trouble with touching right thumb to left ear) commands.   Attention: decreased. Concentration: decreased.   Speech: slurred   Level of consciousness: drowsy       Daughter reports "talking out of her head" overnight     CRANIAL NERVES     CN III, IV, VI   Pupils are equal, round, and reactive to light.  Nystagmus: none     CN VII   Facial expression full, symmetric.     CN VIII   Hearing: impaired    CN XII   CN XII normal.     MOTOR EXAM   Muscle bulk: normal  Overall muscle tone: normal       Sitting up on side of bed working with PT  Moving all extremities antigravity      REFLEXES        Diminished throughout      Significant Labs: "   Hemoglobin A1c: No results for input(s): HGBA1C in the last 720 hours.  Blood Culture: No results for input(s): LABBLOO in the last 48 hours.  CBC:   Recent Labs  Lab 09/11/17 0451   WBC 5.32   HGB 12.9   HCT 39.3   *     CMP:   Recent Labs  Lab 09/10/17  0431 09/11/17 0451    132*    143   K 3.8 3.9    104   CO2 23 29   BUN 14 11   CREATININE 1.5* 1.4   CALCIUM 8.8 9.2   MG 1.5* 1.4*   ANIONGAP 10 10   EGFRNONAA 34.3* 37.3*     CSF Culture: No results for input(s): CSFCULTURE in the last 48 hours.  CSF Studies: No results for input(s): ALIQUT, APPEARCSF, COLORCSF, CSFWBC, CSFRBC, GLUCCSF, LDHCSF, PROTEINCSF, VDRLCSF in the last 48 hours.  Inflammatory Markers: No results for input(s): SEDRATE, CRP, PROCAL in the last 48 hours.  Respiratory Culture: No results for input(s): GSRESP, RESPIRATORYC in the last 48 hours.  Urine Culture: No results for input(s): LABURIN in the last 48 hours.  Urine Studies: No results for input(s): COLORU, APPEARANCEUA, PHUR, SPECGRAV, PROTEINUA, GLUCUA, KETONESU, BILIRUBINUA, OCCULTUA, NITRITE, UROBILINOGEN, LEUKOCYTESUR, RBCUA, WBCUA, BACTERIA, SQUAMEPITHEL, HYALINECASTS in the last 48 hours.    Invalid input(s): WRIGHTSUR  All pertinent lab results from the past 24 hours have been reviewed.    24 hr vEEG (pending)    Significant Imaging: I have reviewed and interpreted all pertinent imaging results/findings within the past 24 hours.

## 2017-09-11 NOTE — ASSESSMENT & PLAN NOTE
-Start iv lopressor 5mg iv q6 hours  -Telemetry  -2D Echo with cfd    9/7 - 153/81  9/10 - stable, 158/ 70, resume coreg and cozzar  9/11 - 145/98 : increased cozaar to 50 daily

## 2017-09-11 NOTE — PROGRESS NOTES
"[facility] Hospital Medicine  Progress Note    Patient Name: Blue Cassidy  MRN: [id number]  Patient Class: IP- Inpatient   Admission Date: 9/6/2017  Length of Stay: 5 days  Attending Physician: Lien Alba MD  Primary Care Provider: Primary [facility] Medicine Team: [facility] Lien Alba MD    Subjective:     Principal Problem:Acute encephalopathy    HPI:  Ms. Cassidy is a 72yo lady with a past medical history of   She now comes as a transfer from [facility] after being admitted there on 9/4/17 with acute altered mental status with agitation.  She had a CT head done there at admission which revealed, "right parietal lobe ischemia."  She was also found to have a, "slightly abnormal UA" and was started on Rocephin 1g iv q24 hours.  She was admitted to the IM service and Neurology was consulted, who recommended an MRI brain.  This was done, but was of such a poor quality that is was un-interpretable.  The night after she was admitted she, "had two focal seizures.  The patient was placed on IV Keppra and an EEG was obtained this morning."  She also had to be given 2mg of iv haldol to control some combativeness.  Since receiving all of these therapies, she has been lethargic and unable to take anything by mouth.       Hospital Course:  9/7 - pt more alert, still not back to baseline mental status accord to family,  Neurology saw pt this am,  2 am, labs collected,  EEG ordered  9/8 - spoke w anesthesia fellow yesterday,  Place another consutlt for anesthesia today for LP and MRI under sedation,  Spoke to neurology fellow and he is going to set up procedures.  9/9- up in chair, verbal and interacting with caretakers, eating a ookie by herself.  LP + protein, 270, MRI - consistent w chronic microvasc disease, and old small stroke w encephalomalasia right fromtal lobe and cerebro volume loss.   K 3.2  9/10 - developed acute urinary retention, holley " placed, discussed care of kishan Hall and Malgorzata, her family present.  Work excuse given to Malgorzata Whalen.  Pt still confused, still waitng on HSV and other lab. See palliative care note below  9/11 - family at bedside reports pt did get agitated but they were able to hansal it.  Will add seroquel prn    Interval History: woke up this am and talking, but still confused    Review of Systems   Constitutional: Negative for chills, diaphoresis, fatigue and fever.   HENT: Negative for congestion, sinus pain and trouble swallowing.    Respiratory: Negative for cough and shortness of breath.    Cardiovascular: Negative for chest pain and leg swelling.   Gastrointestinal: Negative for abdominal distention, abdominal pain, blood in stool, constipation, diarrhea and vomiting.   Genitourinary: Positive for difficulty urinating.   Musculoskeletal: Negative for neck pain and neck stiffness.   Skin: Negative for rash.   Neurological: Negative for tremors, weakness, numbness and headaches.   Psychiatric/Behavioral: Negative for agitation, behavioral problems and confusion.     Objective:     Vital Signs (Most Recent):  Temp: 98.2 °F (36.8 °C) (09/11/17 0751)  Pulse: 71 (09/11/17 0751)  Resp: 18 (09/11/17 0751)  BP: (!) 145/98 (09/11/17 0751)  SpO2: 96 % (09/11/17 0751) Vital Signs (24h Range):  Temp:  [98 °F (36.7 °C)-98.5 °F (36.9 °C)] 98.2 °F (36.8 °C)  Pulse:  [71-83] 71  Resp:  [18-20] 18  SpO2:  [93 %-96 %] 96 %  BP: (142-198)/(77-98) 145/98     Weight: (!) 145 kg (319 lb 10.7 oz)  Body mass index is 54.87 kg/m².    Intake/Output Summary (Last 24 hours) at 09/11/17 1153  Last data filed at 09/11/17 0400   Gross per 24 hour   Intake             1020 ml   Output             2800 ml   Net            -1780 ml      Physical Exam   Constitutional: She is oriented to person, place, and time. She appears well-developed and well-nourished.   obese   HENT:   Head: Normocephalic and atraumatic.   Mouth/Throat: Oropharynx is clear and  moist.   Eyes: Conjunctivae and EOM are normal. Pupils are equal, round, and reactive to light. No scleral icterus.   Neck: Normal range of motion. Neck supple. No thyromegaly present.   Cardiovascular: Normal rate, regular rhythm and normal heart sounds.    Pulmonary/Chest: Effort normal and breath sounds normal. No respiratory distress. She has no wheezes. She has no rales.   Abdominal: Soft. Bowel sounds are normal. She exhibits no distension and no mass. There is no tenderness. There is no rebound and no guarding. No hernia.   Musculoskeletal: Normal range of motion. She exhibits edema. She exhibits no deformity.   Lymphadenopathy:     She has no cervical adenopathy.   Neurological: She is alert and oriented to person, place, and time. She has normal strength.   Psychiatric: She has a normal mood and affect. Her behavior is normal. Her speech is delayed. Cognition and memory are impaired. She exhibits abnormal recent memory and abnormal remote memory.   Sundowning at night She is attentive.       Significant Labs:   CBC:     Recent Labs  Lab 09/11/17  0451   WBC 5.32   HGB 12.9   HCT 39.3   *     CMP:     Recent Labs  Lab 09/10/17  0431 09/11/17  0451    143   K 3.8 3.9    104   CO2 23 29    132*   BUN 14 11   CREATININE 1.5* 1.4   CALCIUM 8.8 9.2   ANIONGAP 10 10   EGFRNONAA 34.3* 37.3*           Assessment/Plan:      * Acute encephalopathy    9/11 - waiting on HSV,if positive will need 8 weeks of IV acyclovir,   will transfer to rehab when accepted,  F/u neurology for rest of results and viral studies. Add prn seoquel q HS for agitation.   9/10 -  Discussed nature of brain disorder w Malgorzata and Isabel, who are asking questions.  Pt has acute encephalitis, which may improve,has evidence of mild dementia and evidence of old stroke per MRI, and active delerium presently, which may also improve.  Discussed cardiac effects of anti-psychotic agents and would use them only if behavior can't  be controlled.  They intend on taking her home. MS likely to improve in a stable environment.   24 hour EEG ordered.  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 Seizure upon admit  - Paraneoplastic process   - Other infection (UTI)  - on empiric acyclovir     On IVFs for hydration, npo     - Fall precaution   - Check B12, MMA, NH3, B1, B6, HIV, RPR, ESR, CRP, TPO, ROXANN  - collected  - Urine drug screen - ordered  - EEG  - ordered  - Autoimmune encephalitis panel in serum - ordered  - MRI brain with and without contrast   - LP w/ CSF analysis:   · Cell count/dif, protein, glucose, ACE    · Infectious panel: arbovirus, CMV, HBV, EBV, HSV, HHV-6, HIV, AMMON Virus(PML), [place] equine, Varicella zoster, West Nile, VDRL CSF, crypto, CJD 14-3-3    · Culture: bacterial/fungal cx  · Paraneoplastic panel   ·   Patient need to be sedated for MRI and LP.   Would need anaesthesia help considering patient need sedation and difficult LP due to body habitus     Abnormal CT head with chronic microvascular ischemic changes  -1.2 cm area of hypodensity in the right parietal lobe without obvious cytotoxic edema, given irregular margins and confinement to white matter.    -Findings most likely represent components of chronic small vessel ischemic disease.  -Nonspecific hypodensity in the right cerebellar hemisphere.  MRI of the brain without and with contrast obtained for further evaluation        Acute urinary retention    Holley placed  placement may improve delirium  Needs to keep it for home  Discussed management of holley w several family members - can do a voiding trial with monthly holley changes.            Seizure    9/11 - stable in hospital, MS somewhat  improved  New onset  keppra   eeg  Neurology consulted          Type 2 diabetes mellitus with complication    Recent Labs      09/10/17   0801  09/10/17   1225  09/10/17   1653  09/10/17   2101  09/11/17   0730  09/11/17   1139   POCTGLUCOSE  139*  164*  147*  160*  103  190*     On SS          HTN (hypertension), malignant    -Start iv lopressor 5mg iv q6 hours  -Telemetry  -2D Echo with cfd    9/7 - 153/81  9/10 - stable, 158/ 70, resume coreg and cozzar  9/11 - 145/98 : increased cozaar to 50 daily        Palliative care encounter    Non-hospice Palliative Care:   Does patient have Capacity? no  Goals- improvement of MS w treatment of encephalitis  Code Status- FULL, we discussed code status and  family contemplating change  Pain management- stable  Prognosis-  good  Family discussions-9/10 - Keron asked me about hospice.  They want it in order to get DME at home.  Pt's  did well on hospice and did not want heroic measures.  They think she has the same wishes but want to discuss with other family members.  Functional Status - up in chair w assistance, some combativeness last night, chronic indwelling holley or I&O caths needed.    Post acute care plan:  home w HH  And DME               Hypokalemia    9/8 - kadur 40 x 2        CHF (congestive heart failure)    stable          Abnormal CT of the head    9/6/17:  1.2 cm area of hypodensity in the right parietal lobe without obvious cytotoxic edema, given irregular margins and confinement to white matter.  Findings most likely represent components of chronic small vessel ischemic disease.  Nonspecific hypodensity in the right cerebellar hemisphere.  MRI of the brain without and with contrast obtained for further evaluation.    9/8/17 MRI - consistent w chronic microvasc disease, and old small stroke w encephalomalasia right fromtal lobe and cerebro volume loss.          Urinary tract infection without hematuria    Rocephin started  9/9 - day 3, no  cultures here, UA was abnormal from outside hospital.  Will dc rocephin and reculture the urine, repeat UA.  No evidece of bacterial meinigitis  Needs to keep foely for chronic urinary retention.  Family can't preform I&O caths given her MS.  Voiding trial monthly w holley changes.          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

## 2017-09-11 NOTE — SUBJECTIVE & OBJECTIVE
Interval History: woke up this am and talking, but still confused    Review of Systems   Constitutional: Negative for chills, diaphoresis, fatigue and fever.   HENT: Negative for congestion, sinus pain and trouble swallowing.    Respiratory: Negative for cough and shortness of breath.    Cardiovascular: Negative for chest pain and leg swelling.   Gastrointestinal: Negative for abdominal distention, abdominal pain, blood in stool, constipation, diarrhea and vomiting.   Genitourinary: Positive for difficulty urinating.   Musculoskeletal: Negative for neck pain and neck stiffness.   Skin: Negative for rash.   Neurological: Negative for tremors, weakness, numbness and headaches.   Psychiatric/Behavioral: Negative for agitation, behavioral problems and confusion.     Objective:     Vital Signs (Most Recent):  Temp: 98.2 °F (36.8 °C) (09/11/17 0751)  Pulse: 71 (09/11/17 0751)  Resp: 18 (09/11/17 0751)  BP: (!) 145/98 (09/11/17 0751)  SpO2: 96 % (09/11/17 0751) Vital Signs (24h Range):  Temp:  [98 °F (36.7 °C)-98.5 °F (36.9 °C)] 98.2 °F (36.8 °C)  Pulse:  [71-83] 71  Resp:  [18-20] 18  SpO2:  [93 %-96 %] 96 %  BP: (142-198)/(77-98) 145/98     Weight: (!) 145 kg (319 lb 10.7 oz)  Body mass index is 54.87 kg/m².    Intake/Output Summary (Last 24 hours) at 09/11/17 1153  Last data filed at 09/11/17 0400   Gross per 24 hour   Intake             1020 ml   Output             2800 ml   Net            -1780 ml      Physical Exam   Constitutional: She is oriented to person, place, and time. She appears well-developed and well-nourished.   obese   HENT:   Head: Normocephalic and atraumatic.   Mouth/Throat: Oropharynx is clear and moist.   Eyes: Conjunctivae and EOM are normal. Pupils are equal, round, and reactive to light. No scleral icterus.   Neck: Normal range of motion. Neck supple. No thyromegaly present.   Cardiovascular: Normal rate, regular rhythm and normal heart sounds.    Pulmonary/Chest: Effort normal and breath sounds  normal. No respiratory distress. She has no wheezes. She has no rales.   Abdominal: Soft. Bowel sounds are normal. She exhibits no distension and no mass. There is no tenderness. There is no rebound and no guarding. No hernia.   Musculoskeletal: Normal range of motion. She exhibits edema. She exhibits no deformity.   Lymphadenopathy:     She has no cervical adenopathy.   Neurological: She is alert and oriented to person, place, and time. She has normal strength.   Psychiatric: She has a normal mood and affect. Her behavior is normal. Her speech is delayed. Cognition and memory are impaired. She exhibits abnormal recent memory and abnormal remote memory.   Sundowning at night She is attentive.       Significant Labs:   CBC:     Recent Labs  Lab 09/11/17  0451   WBC 5.32   HGB 12.9   HCT 39.3   *     CMP:     Recent Labs  Lab 09/10/17  0431 09/11/17  0451    143   K 3.8 3.9    104   CO2 23 29    132*   BUN 14 11   CREATININE 1.5* 1.4   CALCIUM 8.8 9.2   ANIONGAP 10 10   EGFRNONAA 34.3* 37.3*

## 2017-09-11 NOTE — PT/OT/SLP PROGRESS
"Speech Language Pathology  Treatment    Blue Cassidy   MRN: [id number]   Admitting Diagnosis: Acute encephalopathy    Diet recommendations: Solid Diet Level: Dental Soft  Liquid Diet Level: Thin Feed only when awake/alert, HOB to 90 degrees, Small bites/sips, Alternating bites/sips, Check for pocketing/oral residue, Meds whole 1 at a time and Assistance with meals    SLP Treatment Date: 09/11/17  Speech Start Time: 1015     Speech Stop Time: 1031     Speech Total (min): 16 min       TREATMENT BILLABLE MINUTES:  Treatment Swallowing Dysfunction 8 and Seld Care/Home Management Training 8    Has the patient been evaluated by SLP for swallowing? : Yes  Keep patient NPO?: No   General Precautions: Standard, aspiration, fall  Current Respiratory Status: nasal cannula       Communicated w/ RN prior to and following tx session.    Subjective:  "Hey baby.  How are you?"  Pt asked upon SLP presentation.    Pain/Comfort  Pain Rating 1: 0/10  Pain Rating Post-Intervention 1: 0/10    Objective:   Patient found with: peripheral IV, holley catheter    Pt was awake and alert upon SLP presentation.  She was offered and accepted po trials of thin liquids by cup and straw and self fed bites of solids.  She demonstrated throat clearing following large sequential sips of thin liquids by straw.  She tolerated all thin liquid cup edge sips well w/ no overt s/s of aspiration.  She required extra time for mastication of solids d/t missing teeth.  Moderate oral stasis was seen on the tongue body following solids cleared w/ multiple liquid washes.  She was unable to report tolerance of am meal.  No family was present at bedside.  She was provided education re: aspiration precautions and SLP POC.  She indicated limited understanding.    FIM:                                 Assessment:  Blue Cassidy is a 71 y.o. female with a medical diagnosis of Acute encephalopathy and presents with dysphagia.    Discharge recommendations: Discharge " Facility/Level Of Care Needs: nursing facility, skilled     Goals:    SLP Goals        Problem: SLP Goal    Goal Priority Disciplines Outcome   SLP Goal     SLP Ongoing (interventions implemented as appropriate)   Description:  Speech Language Pathology Goals  Goals expected to be met by 9/14    1.  Pt will tolerate Dental Soft diet w/thin liquids w/ no overt s/s of aspiration.                         Plan:   Patient to be seen Therapy Frequency: 5 x/week   Plan of Care expires: 10/06/17  Plan of Care reviewed with: patient  SLP Follow-up?: Yes              Zoila Santo CCC-SLP  09/11/2017

## 2017-09-11 NOTE — PLAN OF CARE
Problem: Physical Therapy Goal  Goal: Physical Therapy Goal  Goals to be met by: 2017    Patient will increase functional independence with mobility by performin. Supine to sit with MInimal Assistance  2. Sit to supine with MInimal Assistance  3. Rolling to Left and Right with Minimal Assistance.  4. Sit to stand transfer with Contact Guard Assistance  5. Bed to chair transfer with Contact Guard Assistance using Rolling Walker  6. Gait  x 25 feet with Contact Guard Assistance using Rolling Walker.      Outcome: Ongoing (interventions implemented as appropriate)  Goals remain appropriate.  Cognition limited mobility hung Corcoran, PT  2017

## 2017-09-11 NOTE — PROGRESS NOTES
[facility]  Neurology  Progress Note    Patient Name: Blue Cassidy  MRN: [id number]  Admission Date: 9/6/2017  Hospital Length of Stay: 5 days  Code Status: Full Code   Attending Provider: Lien Alba MD  Primary Care Physician: Primary Doctor No   Principal Problem:Acute encephalopathy      Subjective:     Interval History:   Daughter reports trouble sleeping last night and somnolence this morning  Awaiting EEG hookup and viral studies in CSF to result  Daughter feels mental status continues to fluctuate regardless of acyclovir    Current Facility-Administered Medications   Medication Dose Route Frequency Provider Last Rate Last Dose    acetaminophen tablet 650 mg  650 mg Oral Q6H PRN Kris Banks PA-C        acyclovir (ZOVIRAX) 1,500 mg in dextrose 5 % 250 mL IVPB  10 mg/kg Intravenous Q12H Lien Alba  mL/hr at 09/11/17 0134 1,500 mg at 09/11/17 0134    albuterol-ipratropium 2.5mg-0.5mg/3mL nebulizer solution 3 mL  3 mL Nebulization Q4H PRN W. Karthik Larios MD        aspirin tablet 325 mg  325 mg Oral Daily Lien Alba MD   325 mg at 09/11/17 0900    carvedilol tablet 12.5 mg  12.5 mg Oral BID Lien Alba MD   12.5 mg at 09/11/17 0914    dextrose 50% injection 12.5 g  12.5 g Intravenous PRN W. Karthik Larios MD        enoxaparin injection 40 mg  40 mg Subcutaneous Daily WSamantha Larios MD   40 mg at 09/10/17 1550    glucagon (human recombinant) injection 1 mg  1 mg Intramuscular PRN W. Karthik Larios MD        insulin aspart pen 0-5 Units  0-5 Units Subcutaneous Q6H PRN WSamantha Larios MD   2 Units at 09/09/17 1230    levothyroxine tablet 50 mcg  50 mcg Oral Before breakfast Lien Alba MD   50 mcg at 09/11/17 0618    lorazepam injection 2 mg  2 mg Intravenous Q4H PRN W. Karthik Larios MD        losartan tablet 50 mg  50 mg Oral Daily Lien Alba MD   50 mg at 09/11/17 0914    midazolam injection 1 mg  1 mg Intravenous Q4H PRN W. Karthik  "MD Ric   1 mg at 09/08/17 1442    midazolam injection 1 mg  1 mg Intravenous Once Kris Banks PA-C   Stopped at 09/07/17 0200    ondansetron injection 4 mg  4 mg Intravenous Q6H PRN CATHLEEN Larios MD   4 mg at 09/09/17 1416    potassium chloride CR capsule 10 mEq  10 mEq Oral Daily Kris Banks PA-C   10 mEq at 09/11/17 0914    quetiapine tablet 25 mg  25 mg Oral Nightly PRN Lien Alba MD         Review of Systems   Constitutional: Positive for fatigue.   Neurological: Negative for headaches.   Psychiatric/Behavioral: Positive for confusion, decreased concentration and sleep disturbance.     Objective:     Vital Signs (Most Recent):  Temp: 97.8 °F (36.6 °C) (09/11/17 1227)  Pulse: 73 (09/11/17 1227)  Resp: 20 (09/11/17 1227)  BP: (!) 179/85 (09/11/17 1227)  SpO2: 96 % (09/11/17 1227) Vital Signs (24h Range):  Temp:  [97.8 °F (36.6 °C)-98.5 °F (36.9 °C)] 97.8 °F (36.6 °C)  Pulse:  [71-83] 73  Resp:  [18-20] 20  SpO2:  [93 %-98 %] 96 %  BP: (142-198)/(77-98) 179/85     Weight: (!) 145 kg (319 lb 10.7 oz)  Body mass index is 54.87 kg/m².    Physical Exam   Constitutional: No distress.   Sitting up on side of bed working with therapy   HENT:   Head: Normocephalic and atraumatic.   Eyes: Conjunctivae are normal. Pupils are equal, round, and reactive to light.   Neck: Neck supple.   Pulmonary/Chest: Effort normal.   Skin: She is not diaphoretic.   Psychiatric: Her speech is slurred.     NEUROLOGICAL EXAMINATION:     MENTAL STATUS   Oriented to person.   Disoriented to place. (Knows [place] but thought we were in the clinic not hospital)  Disoriented to time.   Follows 1 step (trouble with touching right thumb to left ear) commands.   Attention: decreased. Concentration: decreased.   Speech: slurred   Level of consciousness: drowsy       Daughter reports "talking out of her head" overnight     CRANIAL NERVES     CN III, IV, VI   Pupils are equal, round, and reactive to light.  Nystagmus: none "     CN VII   Facial expression full, symmetric.     CN VIII   Hearing: impaired    CN XII   CN XII normal.     MOTOR EXAM   Muscle bulk: normal  Overall muscle tone: normal       Sitting up on side of bed working with PT  Moving all extremities antigravity      REFLEXES        Diminished throughout      Significant Labs:   Hemoglobin A1c: No results for input(s): HGBA1C in the last 720 hours.  Blood Culture: No results for input(s): LABBLOO in the last 48 hours.  CBC:   Recent Labs  Lab 09/11/17 0451   WBC 5.32   HGB 12.9   HCT 39.3   *     CMP:   Recent Labs  Lab 09/10/17  0431 09/11/17  0451    132*    143   K 3.8 3.9    104   CO2 23 29   BUN 14 11   CREATININE 1.5* 1.4   CALCIUM 8.8 9.2   MG 1.5* 1.4*   ANIONGAP 10 10   EGFRNONAA 34.3* 37.3*     CSF Culture: No results for input(s): CSFCULTURE in the last 48 hours.  CSF Studies: No results for input(s): ALIQUT, APPEARCSF, COLORCSF, CSFWBC, CSFRBC, GLUCCSF, LDHCSF, PROTEINCSF, VDRLCSF in the last 48 hours.  Inflammatory Markers: No results for input(s): SEDRATE, CRP, PROCAL in the last 48 hours.  Respiratory Culture: No results for input(s): GSRESP, RESPIRATORYC in the last 48 hours.  Urine Culture: No results for input(s): LABURIN in the last 48 hours.  Urine Studies: No results for input(s): COLORU, APPEARANCEUA, PHUR, SPECGRAV, PROTEINUA, GLUCUA, KETONESU, BILIRUBINUA, OCCULTUA, NITRITE, UROBILINOGEN, LEUKOCYTESUR, RBCUA, WBCUA, BACTERIA, SQUAMEPITHEL, HYALINECASTS in the last 48 hours.    Invalid input(s): WRIGHTSUR  All pertinent lab results from the past 24 hours have been reviewed.    24 hr vEEG (pending)    Significant Imaging: I have reviewed and interpreted all pertinent imaging results/findings within the past 24 hours.    Assessment and Plan:     Encephalitis    Presented to [facility] 9/4 with acute change in mental status and agitation. 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 LP on 9/8-CSF with 2 WBC, 133 RBC, 270 protein, 73 glucose. MRI Brain 9/8 with chronic microvascular ischemic changes and small area of cystic encephalomalacia in the deep right frontal lobe c/w remote lacunar infarct.    Presentation concerning for CNS infection vs. delirium vs. seizures.   9/11-Daughter reports mental status continues to fluctuate, but per team her mental status has improved since admission.     Recommendations:  -24 hr vEEG pending  -Viral and autoimmune encephalopathy CSF studies still pending  -Start Seroquel 50 mg qhs and regulate sleep/wake cycle with delirium precautions  -continue IV acyclovir for now while CSF studies are pending   Seizure    Suspected seizure x 2 at OSH on 9/4/17    -24 hr vEEG pending   Keppra discontinued   Urinary tract infection without hematuria    Primary team discontinued rocephin  And will repeat UA +/- urine Cx     VTE Risk Mitigation         Ordered     enoxaparin injection 40 mg  Daily     Route:  Subcutaneous        09/06/17 1954     Medium Risk of VTE  Once      09/06/17 1954     Place sequential compression device  Until discontinued      09/06/17 1954     Place ROSITA hose  Until discontinued      09/06/17 1954        Dr. Maurice attestation to follow    Echo Blake PA-C  General Neurology Consult  Neuro Consult Spectralink # [id number]

## 2017-09-11 NOTE — PLAN OF CARE
Problem: SLP Goal  Goal: SLP Goal  Speech Language Pathology Goals  Goals expected to be met by 9/14    1.  Pt will tolerate Dental Soft diet w/thin liquids w/ no overt s/s of aspiration.       Outcome: Ongoing (interventions implemented as appropriate)  Pt participated well w/ tx tasks.  Supervision w/ meals is recommended.  Cont ST per POC.    Zoila Santo CCC-SLP  9/11/2017

## 2017-09-11 NOTE — PLAN OF CARE
Problem: Patient Care Overview  Goal: Plan of Care Review  Pt confused. Pt free from falls. Pt wears non slip socks when ambulating. Pt bed low and locked position. Pt afebrile. Pt IV site without redness or edema. Pt has denied any pain or discomfort this shift.

## 2017-09-11 NOTE — PT/OT/SLP PROGRESS
Physical Therapy  Treatment    Blue Cassidy   MRN: [id number]   Admitting Diagnosis: Acute encephalopathy    PT Received On: 09/11/17  PT Start Time: 1040     PT Stop Time: 1108    PT Total Time (min): 28 min       Billable Minutes:  Therapeutic Activity 15 and Therapeutic Exercise 13    Treatment Type: Treatment  PT/PTA: PT     PTA Visit Number: 0       General Precautions: Standard, aspiration, fall  Orthopedic Precautions: N/A   Braces: N/A    Do you have any cultural, spiritual, [place]ybeterian conflicts, given your current situation?: none stated    Subjective:  Communicated with RN prior to session.      Pain/Comfort  Pain Rating 1: 0/10  Pain Rating Post-Intervention 1: 0/10    Objective:   Patient found with: peripheral IV, holley catheter    Functional Mobility:  Bed Mobility:   Scooting/Bridging: Contact Guard Assistance (increased time but able to reach HOB with bed flat)    Transfers:  Sit <> Stand Assistance: Moderate Assistance  Sit <> Stand Assistive Device: Rolling Walker, No Assistive Device  Bed <> Chair Technique: Stand Pivot (attempted but pt unsafe reachign for multiple items and unable to hold onto walker consistently to maintain her balance)  Bed <> Chair Assistance: Maximum Assistance  Bed <> Chair Assistive Device: Rolling Walker, No Assistive Device    Gait:   Gait Distance: unable to attempt.  pt with poor standing balance and poor attention to tasks and to holding onto walker  Assistance 1: Total assistance    Balance:   Static Sit: FAIR: Maintains without assist, but unable to take any challenges   Dynamic Sit: FAIR: Cannot move trunk without losing balance  Static Stand: POOR+: Needs MINIMAL assist to maintain  Dynamic stand: POOR: N/A     Therapeutic Activities and Exercises:  Pt perofrmed static sitting while her daughter brushed her hair.  Pt tried to comb her own hair but was only able to reach a small portion.  Pt performed sit to stands 3 trials.  Pt with poor attention to task unable  to maintain BUE on walker and attend to full upright position.    Pt unable to attempt stepping to either chair or steppign away from bedside due to weakness     AM-PAC 6 CLICK MOBILITY  How much help from another person does this patient currently need?   1 = Unable, Total/Dependent Assistance  2 = A lot, Maximum/Moderate Assistance  3 = A little, Minimum/Contact Guard/Supervision  4 = None, Modified [place]/Independent    Turning over in bed (including adjusting bedclothes, sheets and blankets)?: 2  Sitting down on and standing up from a chair with arms (e.g., wheelchair, bedside commode, etc.): 2  Moving from lying on back to sitting on the side of the bed?: 2  Moving to and from a bed to a chair (including a wheelchair)?: 2  Need to walk in hospital room?: 1  Climbing 3-5 steps with a railing?: 1  Total Score: 10    AM-PAC Raw Score CMS G-Code Modifier Level of Impairment Assistance   6 % Total / Unable   7 - 9 CM 80 - 100% Maximal Assist   10 - 14 CL 60 - 80% Moderate Assist   15 - 19 CK 40 - 60% Moderate Assist   20 - 22 CJ 20 - 40% Minimal Assist   23 CI 1-20% SBA / CGA   24 CH 0% Independent/ Mod I     Patient left supine with all lines intact and call button in reach.    Assessment:  Blue Cassidy is a 71 y.o. female with a medical diagnosis of Acute encephalopathy and presents with continued confusion with impaired balance and safety for sitting and standing.  She is currently very weak with poor attention to tasks which adversely affects all mobility.  She will require continued PT to progress and will likely require SNF placement.    Rehab identified problem list/impairments: Rehab identified problem list/impairments: weakness, impaired balance, impaired endurance, impaired functional mobilty, gait instability, decreased lower extremity function, impaired cardiopulmonary response to activity, impaired cognition, decreased safety awareness    Rehab potential is fair.    Activity  tolerance: Fair    Discharge recommendations: Discharge Facility/Level Of Care Needs: nursing facility, skilled     Barriers to discharge: Barriers to Discharge: Decreased caregiver support    Equipment recommendations:       GOALS:    Physical Therapy Goals        Problem: Physical Therapy Goal    Goal Priority Disciplines Outcome Goal Variances Interventions   Physical Therapy Goal     PT/OT, PT Ongoing (interventions implemented as appropriate)     Description:  Goals to be met by: 2017    Patient will increase functional independence with mobility by performin. Supine to sit with MInimal Assistance  2. Sit to supine with MInimal Assistance  3. Rolling to Left and Right with Minimal Assistance.  4. Sit to stand transfer with Contact Guard Assistance  5. Bed to chair transfer with Contact Guard Assistance using Rolling Walker  6. Gait  x 25 feet with Contact Guard Assistance using Rolling Walker.                       PLAN:    Patient to be seen 3 x/week  to address the above listed problems via gait training, therapeutic activities, therapeutic exercises  Plan of Care expires: 10/09/17  Plan of Care reviewed with: patient         Aditya Nilo, PT  2017

## 2017-09-11 NOTE — ASSESSMENT & PLAN NOTE
Presented to [facility] 9/4 with acute change in mental status and agitation. 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 LP on 9/8-CSF with 2 WBC, 133 RBC, 270 protein, 73 glucose. MRI Brain 9/8 with chronic microvascular ischemic changes and small area of cystic encephalomalacia in the deep right frontal lobe c/w remote lacunar infarct.    Presentation concerning for CNS infection vs. delirium vs. seizures.   9/11-Daughter reports mental status continues to fluctuate, but per team her mental status has improved since admission.     Recommendations:  -24 hr vEEG pending  -Viral and autoimmune encephalopathy CSF studies still pending  -Start Seroquel 50 mg qhs and regulate sleep/wake cycle with delirium precautions  -continue IV acyclovir for now while CSF studies are pending

## 2017-09-12 PROBLEM — R41.0 DELIRIUM: Status: ACTIVE | Noted: 2017-09-06

## 2017-09-12 LAB
ANION GAP SERPL CALC-SCNC: 10 MMOL/L
BACTERIA UR CULT: NO GROWTH
BASOPHILS # BLD AUTO: 0.03 K/UL
BASOPHILS NFR BLD: 0.5 %
BUN SERPL-MCNC: 11 MG/DL
CALCIUM SERPL-MCNC: 9.1 MG/DL
CHLORIDE SERPL-SCNC: 99 MMOL/L
CO2 SERPL-SCNC: 33 MMOL/L
CREAT SERPL-MCNC: 1.3 MG/DL
DIFFERENTIAL METHOD: ABNORMAL
EBV DNA # SPEC NAA+PROBE: <390 CPY/ML
EBV DNA SPEC NAA+PROBE-LOG#: <2.6 LOG
EBV DNA SPEC QL NAA+PROBE: NOT DETECTED
EEEV IGG TITR CSF IF: NORMAL {TITER}
EEEV IGM TITR CSF IF: NORMAL {TITER}
EOSINOPHIL # BLD AUTO: 0.2 K/UL
EOSINOPHIL NFR BLD: 2.9 %
ERYTHROCYTE [DISTWIDTH] IN BLOOD BY AUTOMATED COUNT: 13.8 %
EST. GFR  (AFRICAN AMERICAN): 47 ML/MIN/1.73 M^2
EST. GFR  (NON AFRICAN AMERICAN): 40.8 ML/MIN/1.73 M^2
GLUCOSE SERPL-MCNC: 126 MG/DL
HCT VFR BLD AUTO: 37 %
HGB BLD-MCNC: 12.2 G/DL
LACV IGG CSF QL IF: NORMAL
LACV IGM CSF QL IF: NORMAL
LYMPHOCYTES # BLD AUTO: 1.2 K/UL
LYMPHOCYTES NFR BLD: 20.5 %
MAGNESIUM SERPL-MCNC: 1.8 MG/DL
MCH RBC QN AUTO: 28.6 PG
MCHC RBC AUTO-ENTMCNC: 33 G/DL
MCV RBC AUTO: 87 FL
MONOCYTES # BLD AUTO: 1.2 K/UL
MONOCYTES NFR BLD: 19.5 %
NEUTROPHILS # BLD AUTO: 3.3 K/UL
NEUTROPHILS NFR BLD: 56.4 %
PHOSPHATE SERPL-MCNC: 2 MG/DL
PLATELET # BLD AUTO: 173 K/UL
PMV BLD AUTO: 11.1 FL
POCT GLUCOSE: 113 MG/DL (ref 70–110)
POCT GLUCOSE: 143 MG/DL (ref 70–110)
POCT GLUCOSE: 171 MG/DL (ref 70–110)
POCT GLUCOSE: 175 MG/DL (ref 70–110)
POTASSIUM SERPL-SCNC: 3.9 MMOL/L
RBC # BLD AUTO: 4.27 M/UL
SLEV IGG TITR CSF IF: NORMAL {TITER}
SLEV IGM TITR CSF IF: NORMAL {TITER}
SODIUM SERPL-SCNC: 142 MMOL/L
SPECIMEN SOURCE: NORMAL
WBC # BLD AUTO: 5.89 K/UL
WEEV IGG TITR CSF IF: NORMAL {TITER}
WEEV IGM TITR CSF IF: NORMAL {TITER}

## 2017-09-12 PROCEDURE — 25000003 PHARM REV CODE 250: Performed by: PHYSICIAN ASSISTANT

## 2017-09-12 PROCEDURE — 99233 SBSQ HOSP IP/OBS HIGH 50: CPT | Mod: ,,, | Performed by: PSYCHIATRY & NEUROLOGY

## 2017-09-12 PROCEDURE — 63600175 PHARM REV CODE 636 W HCPCS: Performed by: HOSPITALIST

## 2017-09-12 PROCEDURE — 84100 ASSAY OF PHOSPHORUS: CPT

## 2017-09-12 PROCEDURE — 97112 NEUROMUSCULAR REEDUCATION: CPT

## 2017-09-12 PROCEDURE — 97110 THERAPEUTIC EXERCISES: CPT

## 2017-09-12 PROCEDURE — 25000003 PHARM REV CODE 250: Performed by: NURSE PRACTITIONER

## 2017-09-12 PROCEDURE — 83735 ASSAY OF MAGNESIUM: CPT

## 2017-09-12 PROCEDURE — 97535 SELF CARE MNGMENT TRAINING: CPT

## 2017-09-12 PROCEDURE — 80048 BASIC METABOLIC PNL TOTAL CA: CPT

## 2017-09-12 PROCEDURE — 63600175 PHARM REV CODE 636 W HCPCS: Performed by: INTERNAL MEDICINE

## 2017-09-12 PROCEDURE — 11000001 HC ACUTE MED/SURG PRIVATE ROOM

## 2017-09-12 PROCEDURE — 25000003 PHARM REV CODE 250: Performed by: HOSPITALIST

## 2017-09-12 PROCEDURE — 99233 SBSQ HOSP IP/OBS HIGH 50: CPT | Mod: ,,, | Performed by: INTERNAL MEDICINE

## 2017-09-12 PROCEDURE — 85025 COMPLETE CBC W/AUTO DIFF WBC: CPT

## 2017-09-12 PROCEDURE — 36415 COLL VENOUS BLD VENIPUNCTURE: CPT

## 2017-09-12 RX ORDER — HYDRALAZINE HYDROCHLORIDE 25 MG/1
25 TABLET, FILM COATED ORAL EVERY 6 HOURS PRN
Status: DISCONTINUED | OUTPATIENT
Start: 2017-09-12 | End: 2017-09-16

## 2017-09-12 RX ORDER — HYDRALAZINE HYDROCHLORIDE 25 MG/1
25 TABLET, FILM COATED ORAL EVERY 6 HOURS PRN
Status: DISCONTINUED | OUTPATIENT
Start: 2017-09-12 | End: 2017-09-12

## 2017-09-12 RX ADMIN — CARVEDILOL 12.5 MG: 12.5 TABLET, FILM COATED ORAL at 09:09

## 2017-09-12 RX ADMIN — ENOXAPARIN SODIUM 40 MG: 100 INJECTION SUBCUTANEOUS at 04:09

## 2017-09-12 RX ADMIN — LEVOTHYROXINE SODIUM 50 MCG: 50 TABLET ORAL at 05:09

## 2017-09-12 RX ADMIN — ASPIRIN 325 MG ORAL TABLET 325 MG: 325 PILL ORAL at 09:09

## 2017-09-12 RX ADMIN — POTASSIUM CHLORIDE 10 MEQ: 750 CAPSULE, EXTENDED RELEASE ORAL at 09:09

## 2017-09-12 RX ADMIN — ACYCLOVIR SODIUM 1500 MG: 50 INJECTION, SOLUTION INTRAVENOUS at 02:09

## 2017-09-12 RX ADMIN — LOSARTAN POTASSIUM 50 MG: 50 TABLET, FILM COATED ORAL at 09:09

## 2017-09-12 RX ADMIN — HYDRALAZINE HYDROCHLORIDE 25 MG: 25 TABLET, FILM COATED ORAL at 07:09

## 2017-09-12 NOTE — PROGRESS NOTES
[facility]  Neurology  Progress Note    Patient Name: Blue Cassidy  MRN: [id number]  Admission Date: 9/6/2017  Hospital Length of Stay: 6 days  Code Status: Full Code   Attending Provider: Torrey Maya MD  Primary Care Physician: Primary Doctor No   Principal Problem:Acute encephalopathy      Subjective:     Interval History:   Hypertensive overnight requiring hydralazine  Awake and interactive this morning  Awaiting transfer to floor with  for 24 hr vEEG  Has not received any Seroquel yet     Current Facility-Administered Medications   Medication Dose Route Frequency Provider Last Rate Last Dose    acetaminophen tablet 650 mg  650 mg Oral Q6H PRN Kris Banks PA-C        albuterol-ipratropium 2.5mg-0.5mg/3mL nebulizer solution 3 mL  3 mL Nebulization Q4H PRN W. Karthik Larios MD        aspirin tablet 325 mg  325 mg Oral Daily Lien Alba MD   325 mg at 09/12/17 0914    carvedilol tablet 12.5 mg  12.5 mg Oral BID Lien Alba MD   12.5 mg at 09/12/17 0914    dextrose 50% injection 12.5 g  12.5 g Intravenous PRN W. Karthik Lraios MD        enoxaparin injection 40 mg  40 mg Subcutaneous Daily WSamantha Larios MD   40 mg at 09/11/17 1752    glucagon (human recombinant) injection 1 mg  1 mg Intramuscular PRN W. Karthik Larios MD        hydrALAZINE tablet 25 mg  25 mg Oral Q6H PRN Jovani Gomez, NP   25 mg at 09/12/17 0751    insulin aspart pen 0-5 Units  0-5 Units Subcutaneous Q6H PRN WSamantha Larios MD   2 Units at 09/09/17 1230    levothyroxine tablet 50 mcg  50 mcg Oral Before breakfast Lien Alba MD   50 mcg at 09/12/17 0535    lorazepam injection 2 mg  2 mg Intravenous Q4H PRN WSamantha Larios MD        losartan tablet 50 mg  50 mg Oral Daily Lien Alba MD   50 mg at 09/12/17 0915    midazolam injection 1 mg  1 mg Intravenous Q4H PRN WSamantha Larios MD   1 mg at 09/08/17 1442    midazolam injection 1 mg  1 mg Intravenous Once Kris Banks PA-C    Stopped at 09/07/17 0200    ondansetron injection 4 mg  4 mg Intravenous Q6H PRN CATHLEEN Larios MD   4 mg at 09/09/17 1416    potassium chloride CR capsule 10 mEq  10 mEq Oral Daily Kris Banks PA-C   10 mEq at 09/12/17 0914    quetiapine tablet 25 mg  25 mg Oral Nightly PRN Lien Alba MD         Review of Systems   Respiratory: Negative for shortness of breath.    Cardiovascular: Negative for chest pain.   Gastrointestinal: Negative for nausea and vomiting.   Musculoskeletal: Negative for neck stiffness.   Neurological: Negative for dizziness, facial asymmetry, light-headedness, numbness and headaches.   Psychiatric/Behavioral: Positive for confusion, decreased concentration and sleep disturbance.     Objective:     Vital Signs (Most Recent):  Temp: 98.5 °F (36.9 °C) (09/12/17 1250)  Pulse: 67 (09/12/17 1250)  Resp: 20 (09/12/17 1250)  BP: (!) 183/81 (09/12/17 1250)  SpO2: (!) 94 % (09/12/17 1250) Vital Signs (24h Range):  Temp:  [97.8 °F (36.6 °C)-99.5 °F (37.5 °C)] 98.5 °F (36.9 °C)  Pulse:  [67-72] 67  Resp:  [16-20] 20  SpO2:  [93 %-96 %] 94 %  BP: (132-201)/() 183/81     Weight: (!) 144.7 kg (319 lb)  Body mass index is 54.76 kg/m².    Physical Exam   Constitutional: No distress.   HENT:   Head: Normocephalic and atraumatic.   Eyes: Conjunctivae and EOM are normal. Pupils are equal, round, and reactive to light.   Neck: Normal range of motion. Neck supple.   Pulmonary/Chest: Effort normal.   Musculoskeletal: Normal range of motion.   Skin: She is not diaphoretic.     NEUROLOGICAL EXAMINATION:     MENTAL STATUS   Oriented to person.   Oriented to place.   Follows 2 step commands.   Level of consciousness: alert  Able to repeat.        Oriented to daughter and grandchildren    When asked who president is she looked down at her food tray and did not respond      CRANIAL NERVES     CN III, IV, VI   Pupils are equal, round, and reactive to light.  Extraocular motions are normal.   Right pupil:  Shape: regular. Reactivity: brisk.   Left pupil: Shape: regular. Reactivity: brisk.     CN VII   Facial expression full, symmetric.     CN VIII   CN VIII normal.     MOTOR EXAM   Muscle bulk: normal  Overall muscle tone: normal    Strength   Right neck flexion: 5/5  Left neck flexion: 5/5  Right neck extension: 5/5  Left neck extension: 5/5  Right deltoid: 5/5  Left deltoid: 5/5  Right biceps: 5/5  Left biceps: 5/5  Right triceps: 5/5  Left triceps: 5/5  Right interossei: 5/5  Left interossei: 5/5    Significant Labs:   Hemoglobin A1c: No results for input(s): HGBA1C in the last 720 hours.  Blood Culture: No results for input(s): LABBLOO in the last 48 hours.  CBC:   Recent Labs  Lab 09/11/17  0451 09/12/17  0541   WBC 5.32 5.89   HGB 12.9 12.2   HCT 39.3 37.0   * 173     CMP:   Recent Labs  Lab 09/11/17 0451 09/12/17  0541   * 126*    142   K 3.9 3.9    99   CO2 29 33*   BUN 11 11   CREATININE 1.4 1.3   CALCIUM 9.2 9.1   MG 1.4* 1.8   ANIONGAP 10 10   EGFRNONAA 37.3* 40.8*     CSF Culture: No results for input(s): CSFCULTURE in the last 48 hours.  CSF Studies: No results for input(s): ALIQUT, APPEARCSF, COLORCSF, CSFWBC, CSFRBC, GLUCCSF, LDHCSF, PROTEINCSF, VDRLCSF in the last 48 hours.     CSF:  Negative: HSV, WNV, EBV   In process: VDRL, HH6V, CMV, paraneoplastic panel    Inflammatory Markers: No results for input(s): SEDRATE, CRP, PROCAL in the last 48 hours.  Prealbumin: No results for input(s): PREALBUMIN in the last 48 hours.  Respiratory Culture: No results for input(s): GSRESP, RESPIRATORYC in the last 48 hours.  Urine Culture: No results for input(s): LABURIN in the last 48 hours.  Urine Studies:   Recent Labs  Lab 09/11/17  1231   COLORU Yellow   APPEARANCEUA Cloudy*   PHUR 5.0   SPECGRAV 1.010   PROTEINUA Negative   GLUCUA Negative   KETONESU Negative   BILIRUBINUA Negative   OCCULTUA 2+*   NITRITE Negative   UROBILINOGEN Negative   LEUKOCYTESUR 1+*   RBCUA 19*   WBCUA 6*    BACTERIA Occasional   SQUAMEPITHEL 30     HIV negative   RPR non-reactive     Awaiting 24 hr vEEG     All pertinent lab results from the past 24 hours have been reviewed.    Significant Imaging: I have reviewed and interpreted all pertinent imaging results/findings within the past 24 hours.    Assessment and Plan:     Delirium    Presented to [facility] 9/4 with acute change in mental status and agitation. 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 LP on 9/8-CSF with 2 WBC, 133 RBC, 270 protein, 73 glucose. MRI Brain 9/8 with chronic microvascular ischemic changes and small area of cystic encephalomalacia in the deep right frontal lobe c/w remote lacunar infarct.    9/11-Daughter reports mental status continues to fluctuate, but per team her mental status has improved since admission.   9/12- Has not received any Seroquel yet, awaiting 24 hr vEEG, HSV in CSF resulted negative      Recommendations:  -planned for transfer to 7th floor for 24 hr vEEG   -Start Seroquel 50 mg qhs and regulate sleep/wake cycle with delirium precautions     VTE Risk Mitigation         Ordered     enoxaparin injection 40 mg  Daily     Route:  Subcutaneous        09/06/17 1954     Medium Risk of VTE  Once      09/06/17 1954     Place sequential compression device  Until discontinued      09/06/17 1954     Place ROSITA hose  Until discontinued      09/06/17 1954        Dr. Gaston attestation to follow    Echo Blake PA-C  General Neurology Consult  Neuro Consult [facility] # [id number]

## 2017-09-12 NOTE — PLAN OF CARE
Problem: SLP Goal  Goal: SLP Goal  Speech Language Pathology Goals  Goals expected to be met by 9/14    1.  Pt will tolerate Dental Soft diet w/thin liquids w/ no overt s/s of aspiration.       Outcome: Ongoing (interventions implemented as appropriate)  Pt reported w/ difficulty w/ soft solid meats d/t not having dentures.  REC:  Consideration for downgrade diet to Mechanical Soft diet w/ thin liquids, meds whole 1 at a time, aspiration precautions.  Will review recs w/ MD.  Cont ST per POC.    Zoila Santo CCC-SLP  9/12/2017

## 2017-09-12 NOTE — SUBJECTIVE & OBJECTIVE
Interval History: woke up this am and talking, but still confused    Review of Systems   Constitutional: Negative for chills, diaphoresis, fatigue and fever.   HENT: Negative for congestion, sinus pain and trouble swallowing.    Respiratory: Negative for cough and shortness of breath.    Cardiovascular: Negative for chest pain and leg swelling.   Gastrointestinal: Negative for abdominal distention, abdominal pain, blood in stool, constipation, diarrhea and vomiting.   Genitourinary: Positive for difficulty urinating.   Musculoskeletal: Negative for neck pain and neck stiffness.   Skin: Negative for rash.   Neurological: Negative for tremors, weakness, numbness and headaches.   Psychiatric/Behavioral: Negative for agitation, behavioral problems and confusion.     Objective:     Vital Signs (Most Recent):  Temp: 98.8 °F (37.1 °C) (09/12/17 0749)  Pulse: 72 (09/12/17 0749)  Resp: 18 (09/12/17 0749)  BP: (!) 182/77 (09/12/17 0857)  SpO2: (!) 94 % (09/12/17 0749) Vital Signs (24h Range):  Temp:  [97.8 °F (36.6 °C)-99.5 °F (37.5 °C)] 98.8 °F (37.1 °C)  Pulse:  [67-73] 72  Resp:  [16-20] 18  SpO2:  [93 %-98 %] 94 %  BP: (132-201)/() 182/77     Weight: (!) 144.7 kg (319 lb)  Body mass index is 54.76 kg/m².    Intake/Output Summary (Last 24 hours) at 09/12/17 1113  Last data filed at 09/12/17 0539   Gross per 24 hour   Intake              440 ml   Output             3250 ml   Net            -2810 ml      Physical Exam   Constitutional: She is oriented to person, place, and time. She appears well-developed and well-nourished.   obese   HENT:   Head: Normocephalic and atraumatic.   Mouth/Throat: Oropharynx is clear and moist.   Eyes: Conjunctivae and EOM are normal. Pupils are equal, round, and reactive to light. No scleral icterus.   Neck: Normal range of motion. Neck supple. No thyromegaly present.   Cardiovascular: Normal rate, regular rhythm and normal heart sounds.    Pulmonary/Chest: Effort normal and breath sounds  normal. No respiratory distress. She has no wheezes. She has no rales.   Abdominal: Soft. Bowel sounds are normal. She exhibits no distension and no mass. There is no tenderness. There is no rebound and no guarding. No hernia.   Musculoskeletal: Normal range of motion. She exhibits edema. She exhibits no deformity.   Lymphadenopathy:     She has no cervical adenopathy.   Neurological: She is alert and oriented to person, place, and time. She has normal strength.   Psychiatric: She has a normal mood and affect. Her behavior is normal. Her speech is delayed. Cognition and memory are impaired. She exhibits abnormal recent memory and abnormal remote memory.   Sundowning at night She is attentive.       Significant Labs:   CBC:     Recent Labs  Lab 09/11/17  0451 09/12/17  0541   WBC 5.32 5.89   HGB 12.9 12.2   HCT 39.3 37.0   * 173     CMP:     Recent Labs  Lab 09/11/17  0451 09/12/17  0541    142   K 3.9 3.9    99   CO2 29 33*   * 126*   BUN 11 11   CREATININE 1.4 1.3   CALCIUM 9.2 9.1   ANIONGAP 10 10   EGFRNONAA 37.3* 40.8*

## 2017-09-12 NOTE — PT/OT/SLP PROGRESS
Occupational Therapy  Treatment    Blue Cassidy   MRN: [id number]   Admitting Diagnosis: Acute encephalopathy    OT Date of Treatment: 09/12/17   OT Start Time: 1449  OT Stop Time: 1529  OT Total Time (min): 40 min    Billable Minutes:  Therapeutic Exercise 23 and Neuromuscular Re-education 17    General Precautions: Standard, fall, aspiration  Orthopedic Precautions: N/A  Braces: N/A    Do you have any cultural, spiritual, Orthodoxy conflicts, given your current situation?: none stated    Subjective:  Communicated with RN prior to session.  Pt required encouragement to participate with therapy this date.   Pain/Comfort  Pain Rating 1: 0/10  Pain Rating Post-Intervention 1: 0/10    Objective:  Patient found with: holley catheter, telemetry     Functional Mobility:  Bed Mobility:  Rolling/Turning to Left: Stand by assistance  Rolling/Turning Right: Stand by assistance  Scooting/Bridging: Contact Guard Assistance  Supine to Sit: Minimum Assistance  Sit to Supine:  (Pt left UIC with nursing notified)    Transfers:   Sit <> Stand Assistance: Minimum Assistance  Sit <> Stand Assistive Device: Rolling Walker  Bed <> Chair Technique: Stand Pivot  Bed <> Chair Transfer Assistance: Minimum Assistance, Moderate Assistance (for balance and RW management)  Bed <> Chair Assistive Device: Rolling Walker    Functional Ambulation: Pt took multiple steps from EOB to bedside chair requiring min for balance and mod A for RW management. She tolerated well however required verbal and tactile cues for upright posture and RW management.    Activities of Daily Living:     UE Dressing Level of Assistance: Minimum assistance (to edgard gown like jacket while seated EOB)    Balance:   Static Sit: GOOD-: Takes MODERATE challenges from all directions but inconsistently  Dynamic Sit: GOOD-: Maintains balance through MODERATE excursions of active trunk movement,     Static Stand: FAIR: Maintains without assist but unable to take  "challenges  Dynamic stand: FAIR: Needs min- Mod A    Therapeutic Activities and Exercises:  -Pt edu on OT role/POC  -Importance of OOB activity with staff assistance  -Pt performed functional t/f to bedside chair taking 4-5 steps with min A for balance and mod A for RW management.   - She tolerated sitting EOB challenging core control requiring SBA  -Pt performed BUE AROM exercises x15 reps while seated UIC-- Shoulder flexion, elbow flexion, chest press, scapular retraction, and shoulder shruggs. She tolerated well with min rest breaks.     Additional:   Eyes open 80% of session  Follow 100% of simple one-step commands; demo difficulty with multi-step commands  Cognition wax and wane throughout treatment session     AM-PAC 6 CLICK ADL   How much help from another person does this patient currently need?   1 = Unable, Total/Dependent Assistance  2 = A lot, Maximum/Moderate Assistance  3 = A little, Minimum/Contact Guard/Supervision  4 = None, Modified [place]/Independent    Putting on and taking off regular lower body clothing? : 2  Bathing (including washing, rinsing, drying)?: 2  Toileting, which includes using toilet, bedpan, or urinal? : 2  Putting on and taking off regular upper body clothing?: 3  Taking care of personal grooming such as brushing teeth?: 3  Eating meals?: 3  Total Score: 15     AM-PAC Raw Score CMS "G-Code Modifier Level of Impairment Assistance   6 % Total / Unable   7 - 8 CM 80 - 100% Maximal Assist   9-13 CL 60 - 80% Moderate Assist   14 - 19 CK 40 - 60% Moderate Assist   20 - 22 CJ 20 - 40% Minimal Assist   23 CI 1-20% SBA / CGA   24 CH 0% Independent/ Mod I       Patient left up in chair with all lines intact, call button in reach and RN notified.     ASSESSMENT:  Blue Cassidy is a 71 y.o. female with a medical diagnosis of Acute encephalopathy and present and tolerated treatment well this date. Pt demo physical deficits with standing balance (static/dynamic), functional " mobility, bed mobility, UB strength, endurance for age appropriate activities, cognition, and overall decline in prior level of functional indep with daily tasks and activities. She would benefit from continued skilled OT to address deficits and maximize return to PLOF. OT recommending SS SNF following post acute care.     Rehab identified problem list/impairments: Rehab identified problem list/impairments: weakness, impaired endurance, gait instability, impaired functional mobilty, decreased safety awareness, impaired cognition    Rehab potential is good.    Activity tolerance: Good    Discharge recommendations: Discharge Facility/Level Of Care Needs: nursing facility, skilled     Barriers to discharge: Barriers to Discharge: Decreased caregiver support    Equipment recommendations:  (TBD)     GOALS:    Occupational Therapy Goals        Problem: Occupational Therapy Goal    Goal Priority Disciplines Outcome Interventions   Occupational Therapy Goal     OT, PT/OT Ongoing (interventions implemented as appropriate)    Description:  Goals to be met by: 9/25/17     Patient will increase functional independence with ADLs by performing:    Feeding with Set-up Assistance in upright position with little to no spilling.  UE Dressing with Set-up Assistance and Stand-by Assistance.  LE Dressing with Set-up Assistance and Moderate Assistance.  Grooming while standing with Contact Guard Assistance.  Toileting from bedside commode with Moderate Assistance for hygiene and clothing management.   Supine to sit with Minimal Assistance. MET 9/12  Stand pivot transfers with Contact Guard Assistance.  Toilet transfer to bedside commode with Contact Guard Assistance.                       Plan:  Patient to be seen 3 x/week to address the above listed problems via self-care/home management, therapeutic activities, cognitive retraining, neuromuscular re-education  Plan of Care expires: 09/09/17  Plan of Care reviewed with: patient          Cee alexander, OT  09/12/2017

## 2017-09-12 NOTE — PLAN OF CARE
Problem: Patient Care Overview  Goal: Plan of Care Review  Outcome: Ongoing (interventions implemented as appropriate)  POC reviewed with pt and family. Pt/family verbalized understanding. Questions and concerns addressed. Pt worked with PT/OT and sit up in chair; pt maybe transferred to neuro floor when bed available. Pt progressing toward goals. Will continue to monitor. See flowsheet for full assessment and vs info.    Problem: Fall Risk (Adult)  Goal: Absence of Falls  Patient will demonstrate the desired outcomes by discharge/transition of care.   Outcome: Ongoing (interventions implemented as appropriate)  Pt remained free from falls.

## 2017-09-12 NOTE — PROGRESS NOTES
"[facility] Hospital Medicine  Progress Note    Patient Name: Blue Cassidy  MRN: [id number]  Patient Class: IP- Inpatient   Admission Date: 9/6/2017  Length of Stay: 6 days  Attending Physician: Torrey Maya MD  Primary Care Provider: Primary Doctor [facility] Medicine Team: [facility] [place] Torrey Maya MD    Subjective:     Principal Problem:Acute encephalopathy    HPI:  Ms. Cassidy is a 70yo lady with a past medical history of   She now comes as a transfer from [facility] after being admitted there on 9/4/17 with acute altered mental status with agitation.  She had a CT head done there at admission which revealed, "right parietal lobe ischemia."  She was also found to have a, "slightly abnormal UA" and was started on Rocephin 1g iv q24 hours.  She was admitted to the IM service and Neurology was consulted, who recommended an MRI brain.  This was done, but was of such a poor quality that is was un-interpretable.  The night after she was admitted she, "had two focal seizures.  The patient was placed on IV Keppra and an EEG was obtained this morning."  She also had to be given 2mg of iv haldol to control some combativeness.  Since receiving all of these therapies, she has been lethargic and unable to take anything by mouth.       Hospital Course:  9/7 - pt more alert, still not back to baseline mental status accord to family,  Neurology saw pt this am,  2 am, labs collected,  EEG ordered  9/8 - spoke w anesthesia fellow yesterday,  Place another consutlt for anesthesia today for LP and MRI under sedation,  Spoke to neurology fellow and he is going to set up procedures.  9/9- up in chair, verbal and interacting with caretakers, eating a ookie by herself.  LP + protein, 270, MRI - consistent w chronic microvasc disease, and old small stroke w encephalomalasia right fromtal lobe and cerebro volume loss.   K 3.2  9/10 - developed acute urinary retention, holley " placed, discussed care of kishan Hall and Malgorzata, her family present.  Work excuse given to Malgorzata Whalen.  Pt still confused, still waitng on HSV and other lab. See palliative care note below  9/11 - family at bedside reports pt did get agitated but they were able to hansal it.  Will add seroquel prn    Interval History: woke up this am and talking, but still confused    Review of Systems   Constitutional: Negative for chills, diaphoresis, fatigue and fever.   HENT: Negative for congestion, sinus pain and trouble swallowing.    Respiratory: Negative for cough and shortness of breath.    Cardiovascular: Negative for chest pain and leg swelling.   Gastrointestinal: Negative for abdominal distention, abdominal pain, blood in stool, constipation, diarrhea and vomiting.   Genitourinary: Positive for difficulty urinating.   Musculoskeletal: Negative for neck pain and neck stiffness.   Skin: Negative for rash.   Neurological: Negative for tremors, weakness, numbness and headaches.   Psychiatric/Behavioral: Negative for agitation, behavioral problems and confusion.     Objective:     Vital Signs (Most Recent):  Temp: 98.8 °F (37.1 °C) (09/12/17 0749)  Pulse: 72 (09/12/17 0749)  Resp: 18 (09/12/17 0749)  BP: (!) 182/77 (09/12/17 0857)  SpO2: (!) 94 % (09/12/17 0749) Vital Signs (24h Range):  Temp:  [97.8 °F (36.6 °C)-99.5 °F (37.5 °C)] 98.8 °F (37.1 °C)  Pulse:  [67-73] 72  Resp:  [16-20] 18  SpO2:  [93 %-98 %] 94 %  BP: (132-201)/() 182/77     Weight: (!) 144.7 kg (319 lb)  Body mass index is 54.76 kg/m².    Intake/Output Summary (Last 24 hours) at 09/12/17 1113  Last data filed at 09/12/17 0539   Gross per 24 hour   Intake              440 ml   Output             3250 ml   Net            -2810 ml      Physical Exam   Constitutional: She is oriented to person, place, and time. She appears well-developed and well-nourished.   obese   HENT:   Head: Normocephalic and atraumatic.   Mouth/Throat: Oropharynx is clear  and moist.   Eyes: Conjunctivae and EOM are normal. Pupils are equal, round, and reactive to light. No scleral icterus.   Neck: Normal range of motion. Neck supple. No thyromegaly present.   Cardiovascular: Normal rate, regular rhythm and normal heart sounds.    Pulmonary/Chest: Effort normal and breath sounds normal. No respiratory distress. She has no wheezes. She has no rales.   Abdominal: Soft. Bowel sounds are normal. She exhibits no distension and no mass. There is no tenderness. There is no rebound and no guarding. No hernia.   Musculoskeletal: Normal range of motion. She exhibits edema. She exhibits no deformity.   Lymphadenopathy:     She has no cervical adenopathy.   Neurological: She is alert and oriented to person, place, and time. She has normal strength.   Psychiatric: She has a normal mood and affect. Her behavior is normal. Her speech is delayed. Cognition and memory are impaired. She exhibits abnormal recent memory and abnormal remote memory.   Sundowning at night She is attentive.       Significant Labs:   CBC:     Recent Labs  Lab 09/11/17  0451 09/12/17  0541   WBC 5.32 5.89   HGB 12.9 12.2   HCT 39.3 37.0   * 173     CMP:     Recent Labs  Lab 09/11/17  0451 09/12/17  0541    142   K 3.9 3.9    99   CO2 29 33*   * 126*   BUN 11 11   CREATININE 1.4 1.3   CALCIUM 9.2 9.1   ANIONGAP 10 10   EGFRNONAA 37.3* 40.8*           Assessment/Plan:      * Acute encephalopathy    9/11 - waiting on HSV,if positive will need 8 weeks of IV acyclovir,   will transfer to rehab when accepted,  F/u neurology for rest of results and viral studies. Add prn seoquel q HS for agitation.   9/10 -  Discussed nature of brain disorder w Malgorzata and Isabel, who are asking questions.  Pt has acute encephalitis, which may improve,has evidence of mild dementia and evidence of old stroke per MRI, and active delerium presently, which may also improve.  Discussed cardiac effects of anti-psychotic agents and  would use them only if behavior can't be controlled.  They intend on taking her home. MS likely to improve in a stable environment.   24 hour EEG ordered.  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 Seizure upon admit  - Paraneoplastic process   - Other infection (UTI)  - on empiric acyclovir     On IVFs for hydration, npo     - Fall precaution   - Check B12, MMA, NH3, B1, B6, HIV, RPR, ESR, CRP, TPO, ROXANN  - collected  - Urine drug screen - ordered  - EEG  - ordered  - Autoimmune encephalitis panel in serum - ordered  - MRI brain with and without contrast   - LP w/ CSF analysis:   · Cell count/dif, protein, glucose, ACE    · Infectious panel: arbovirus, CMV, HBV, EBV, HSV, HHV-6, HIV, AMMON Virus(PML), [place] equine, Varicella zoster, West Nile, VDRL CSF, crypto, CJD 14-3-3    · Culture: bacterial/fungal cx  · Paraneoplastic panel   ·   Patient need to be sedated for MRI and LP.   Would need anaesthesia help considering patient need sedation and difficult LP due to body habitus     Abnormal CT head with chronic microvascular ischemic changes  -1.2 cm area of hypodensity in the right parietal lobe without obvious cytotoxic edema, given irregular margins and confinement to white matter.    -Findings most likely represent components of chronic small vessel ischemic disease.  -Nonspecific hypodensity in the right cerebellar hemisphere.  MRI of the brain without and with contrast obtained for further evaluation        Palliative care encounter    Non-hospice Palliative Care:   Does patient have Capacity? no  Goals- improvement of MS w treatment of encephalitis  Code Status- FULL, we discussed code status and  family contemplating change  Pain management-  stable  Prognosis-  good  Family discussions-9/10 - Isabel and Malgorzata asked me about hospice.  They want it in order to get DME at home.  Pt's  did well on hospice and did not want heroic measures.  They think she has the same wishes but want to discuss with other family members.  Functional Status - up in chair w assistance, some combativeness last night, chronic indwelling holley or I&O caths needed.    Post acute care plan:  home w HH  And DME               Acute urinary retention    Holley placed  placement may improve delirium  Needs to keep it for home  Discussed management of holley w several family members - can do a voiding trial with monthly holley changes.            Hypokalemia    9/8 - kadur 40 x 2        CHF (congestive heart failure)    stable          HTN (hypertension), malignant    -Start iv lopressor 5mg iv q6 hours  -Telemetry  -2D Echo with cfd    9/7 - 153/81  9/10 - stable, 158/ 70, resume coreg and cozzar  9/11 - 145/98 : increased cozaar to 50 daily        Type 2 diabetes mellitus with complication    Recent Labs      09/10/17   0801  09/10/17   1225  09/10/17   1653  09/10/17   2101  09/11/17   0730  09/11/17   1139   POCTGLUCOSE  139*  164*  147*  160*  103  190*     On SS          Abnormal CT of the head    9/6/17:  1.2 cm area of hypodensity in the right parietal lobe without obvious cytotoxic edema, given irregular margins and confinement to white matter.  Findings most likely represent components of chronic small vessel ischemic disease.  Nonspecific hypodensity in the right cerebellar hemisphere.  MRI of the brain without and with contrast obtained for further evaluation.    9/8/17 MRI - consistent w chronic microvasc disease, and old small stroke w encephalomalasia right fromtal lobe and cerebro volume loss.          Seizure    9/11 - stable in hospital, MS somewhat improved  New onset  keppra   eeg  Neurology consulted          Urinary tract infection without hematuria     Rocephin started  9/9 - day 3, no cultures here, UA was abnormal from outside hospital.  Will dc rocephin and reculture the urine, repeat UA.  No evidece of bacterial meinigitis  Needs to keep foely for chronic urinary retention.  Family can't preform I&O caths given her MS.  Voiding trial monthly w holley changes.          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

## 2017-09-12 NOTE — ASSESSMENT & PLAN NOTE
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 Add prn seoquel q HS for agitation.   9/10 -  Discussed nature of brain disorder w Malgorzata and Isabel, who are asking questions.  Pt has acute encephalitis, which may improve,has evidence of mild dementia and evidence of old stroke per MRI, and active delerium presently, which may also improve.  Discussed cardiac effects of anti-psychotic agents and would use them only if behavior can't be controlled.  They intend on taking her home. MS likely to improve in a stable environment.   24 hour EEG ordered.  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 Seizure upon admit  - Paraneoplastic process   - Other infection (UTI)  - on empiric acyclovir     On IVFs for hydration, npo     - Fall precaution   - Check B12, MMA, NH3, B1, B6, HIV, RPR, ESR, CRP, TPO, ROXANN  - collected  - Urine drug screen - ordered  - EEG  - ordered  - Autoimmune encephalitis panel in serum - ordered  - MRI brain with and without contrast   - LP w/ CSF analysis:   · Cell count/dif, protein, glucose, ACE    · Infectious panel: arbovirus, CMV, HBV, EBV, HSV, HHV-6, HIV, AMMON Virus(PML), [place] equine, Varicella zoster, West Nile, VDRL CSF, crypto, CJD 14-3-3    · Culture: bacterial/fungal cx  · Paraneoplastic panel   ·   Patient need to be sedated for MRI and LP.   Would need anaesthesia help considering patient need sedation and difficult LP due to body habitus     Abnormal CT head with chronic microvascular ischemic changes  -1.2 cm area of hypodensity in the right parietal lobe without obvious cytotoxic edema, given irregular  margins and confinement to white matter.    -Findings most likely represent components of chronic small vessel ischemic disease.  -Nonspecific hypodensity in the right cerebellar hemisphere.  MRI of the brain without and with contrast obtained for further evaluation

## 2017-09-12 NOTE — NURSING
Informed team IMB for pt's continued elevated blood pressures; hydralazine 25mg administered this am. Awaiting instructions.

## 2017-09-12 NOTE — PT/OT/SLP PROGRESS
"Speech Language Pathology  Treatment    Blue Cassidy   MRN: [id number]   Admitting Diagnosis: Acute encephalopathy    Diet recommendations: Solid Diet Level: Mechanical soft  Liquid Diet Level: Thin Feed only when awake/alert, HOB to 90 degrees, Small bites/sips, Alternating bites/sips, 1 bite/sip at a time, Meds whole 1 at a time and Assistance with meals    SLP Treatment Date: 09/12/17  Speech Start Time: 1028     Speech Stop Time: 1040     Speech Total (min): 12 min       TREATMENT BILLABLE MINUTES:  Seld Care/Home Management Training 12    Has the patient been evaluated by SLP for swallowing? : Yes  Keep patient NPO?: No   General Precautions: Standard, aspiration, fall  Current Respiratory Status: nasal cannula       Subjective:  "She ate well but she had trouble with the meats."  pts daughter stated    Pain/Comfort  Pain Rating 1: 0/10  Pain Rating Post-Intervention 1: 0/10    Objective:   Patient found with: peripheral IV, holley catheter    Pt was resting heavily upon SLP arrival.  Pt's daughter present at bedside stated that pt tolerated breakfast well w/ no overt s/s of aspiration.  She reported pt had difficulty chewing soft solid meats at lunch and evening meal yesterday.  Reviewed w/ daughter consideration for Mechanical Soft diet w/ chopped meats and she indicated agreement to downgrade diet.  Pt's family also encouraged to bring in pt;s dentures from home if able to help pt w/ mastication.  Attempted to wake pt but she was unarousable.  Education was provided to pt's daughter re: s/s of aspiration, risk of aspiration, aspiration precautions, diet recs and SLP POC.  She indicated good understanding of the information provided.    FIM:                                 Assessment:  Blue Cassidy is a 71 y.o. female with a medical diagnosis of Acute encephalopathy and presents with dysphagia.    Discharge recommendations: Discharge Facility/Level Of Care Needs: nursing facility, skilled     Goals:    " SLP Goals        Problem: SLP Goal    Goal Priority Disciplines Outcome   SLP Goal     SLP Ongoing (interventions implemented as appropriate)   Description:  Speech Language Pathology Goals  Goals expected to be met by 9/14    1.  Pt will tolerate Dental Soft diet w/thin liquids w/ no overt s/s of aspiration.                         Plan:   Patient to be seen Therapy Frequency: 5 x/week   Plan of Care expires: 10/06/17  Plan of Care reviewed with: daughter  SLP Follow-up?: Yes              Zoila Santo CCC-SLP  09/12/2017

## 2017-09-12 NOTE — PLAN OF CARE
Problem: Occupational Therapy Goal  Goal: Occupational Therapy Goal  Goals to be met by: 9/25/17     Patient will increase functional independence with ADLs by performing:    Feeding with Set-up Assistance in upright position with little to no spilling.  UE Dressing with Set-up Assistance and Stand-by Assistance.  LE Dressing with Set-up Assistance and Moderate Assistance.  Grooming while standing with Contact Guard Assistance.  Toileting from bedside commode with Moderate Assistance for hygiene and clothing management.   Supine to sit with Minimal Assistance. MET 9/12  Stand pivot transfers with Contact Guard Assistance.  Toilet transfer to bedside commode with Contact Guard Assistance.     Outcome: Ongoing (interventions implemented as appropriate)  Continue with POC. Cee alexander OT  9/12/2017

## 2017-09-12 NOTE — SUBJECTIVE & OBJECTIVE
Subjective:     Interval History:   Hypertensive overnight requiring hydralazine  Awake and interactive this morning  Awaiting transfer to floor with  for 24 hr vEEG  Has not received any Seroquel yet     Current Facility-Administered Medications   Medication Dose Route Frequency Provider Last Rate Last Dose    acetaminophen tablet 650 mg  650 mg Oral Q6H PRN Kris Banks PA-C        albuterol-ipratropium 2.5mg-0.5mg/3mL nebulizer solution 3 mL  3 mL Nebulization Q4H PRN W. Karthik Larios MD        aspirin tablet 325 mg  325 mg Oral Daily Lien Alba MD   325 mg at 09/12/17 0914    carvedilol tablet 12.5 mg  12.5 mg Oral BID Lien Alba MD   12.5 mg at 09/12/17 0914    dextrose 50% injection 12.5 g  12.5 g Intravenous PRN W. Karthik Larios MD        enoxaparin injection 40 mg  40 mg Subcutaneous Daily W. Karthik Larios MD   40 mg at 09/11/17 1752    glucagon (human recombinant) injection 1 mg  1 mg Intramuscular PRN W. Karthik Larios MD        hydrALAZINE tablet 25 mg  25 mg Oral Q6H PRN Jovani Gomez NP   25 mg at 09/12/17 0751    insulin aspart pen 0-5 Units  0-5 Units Subcutaneous Q6H PRN CATHLEEN Larios MD   2 Units at 09/09/17 1230    levothyroxine tablet 50 mcg  50 mcg Oral Before breakfast Lien Alba MD   50 mcg at 09/12/17 0535    lorazepam injection 2 mg  2 mg Intravenous Q4H PRN CATHLEEN Larios MD        losartan tablet 50 mg  50 mg Oral Daily Lien Alba MD   50 mg at 09/12/17 0915    midazolam injection 1 mg  1 mg Intravenous Q4H PRN WSamantha Larios MD   1 mg at 09/08/17 1442    midazolam injection 1 mg  1 mg Intravenous Once Kris Banks PA-C   Stopped at 09/07/17 0200    ondansetron injection 4 mg  4 mg Intravenous Q6H PRN CATHLEEN Larios MD   4 mg at 09/09/17 1416    potassium chloride CR capsule 10 mEq  10 mEq Oral Daily Kris Banks PA-C   10 mEq at 09/12/17 0914    quetiapine tablet 25 mg  25 mg Oral Nightly PRN Lien Alba MD         Review  of Systems   Respiratory: Negative for shortness of breath.    Cardiovascular: Negative for chest pain.   Gastrointestinal: Negative for nausea and vomiting.   Musculoskeletal: Negative for neck stiffness.   Neurological: Negative for dizziness, facial asymmetry, light-headedness, numbness and headaches.   Psychiatric/Behavioral: Positive for confusion, decreased concentration and sleep disturbance.     Objective:     Vital Signs (Most Recent):  Temp: 98.5 °F (36.9 °C) (09/12/17 1250)  Pulse: 67 (09/12/17 1250)  Resp: 20 (09/12/17 1250)  BP: (!) 183/81 (09/12/17 1250)  SpO2: (!) 94 % (09/12/17 1250) Vital Signs (24h Range):  Temp:  [97.8 °F (36.6 °C)-99.5 °F (37.5 °C)] 98.5 °F (36.9 °C)  Pulse:  [67-72] 67  Resp:  [16-20] 20  SpO2:  [93 %-96 %] 94 %  BP: (132-201)/() 183/81     Weight: (!) 144.7 kg (319 lb)  Body mass index is 54.76 kg/m².    Physical Exam   Constitutional: No distress.   HENT:   Head: Normocephalic and atraumatic.   Eyes: Conjunctivae and EOM are normal. Pupils are equal, round, and reactive to light.   Neck: Normal range of motion. Neck supple.   Pulmonary/Chest: Effort normal.   Musculoskeletal: Normal range of motion.   Skin: She is not diaphoretic.     NEUROLOGICAL EXAMINATION:     MENTAL STATUS   Oriented to person.   Oriented to place.   Follows 2 step commands.   Level of consciousness: alert  Able to repeat.        Oriented to daughter and grandchildren    When asked who president is she looked down at her food tray and did not respond      CRANIAL NERVES     CN III, IV, VI   Pupils are equal, round, and reactive to light.  Extraocular motions are normal.   Right pupil: Shape: regular. Reactivity: brisk.   Left pupil: Shape: regular. Reactivity: brisk.     CN VII   Facial expression full, symmetric.     CN VIII   CN VIII normal.     MOTOR EXAM   Muscle bulk: normal  Overall muscle tone: normal    Strength   Right neck flexion: 5/5  Left neck flexion: 5/5  Right neck extension:  5/5  Left neck extension: 5/5  Right deltoid: 5/5  Left deltoid: 5/5  Right biceps: 5/5  Left biceps: 5/5  Right triceps: 5/5  Left triceps: 5/5  Right interossei: 5/5  Left interossei: 5/5    Significant Labs:   Hemoglobin A1c: No results for input(s): HGBA1C in the last 720 hours.  Blood Culture: No results for input(s): LABBLOO in the last 48 hours.  CBC:   Recent Labs  Lab 09/11/17  0451 09/12/17  0541   WBC 5.32 5.89   HGB 12.9 12.2   HCT 39.3 37.0   * 173     CMP:   Recent Labs  Lab 09/11/17  0451 09/12/17  0541   * 126*    142   K 3.9 3.9    99   CO2 29 33*   BUN 11 11   CREATININE 1.4 1.3   CALCIUM 9.2 9.1   MG 1.4* 1.8   ANIONGAP 10 10   EGFRNONAA 37.3* 40.8*     CSF Culture: No results for input(s): CSFCULTURE in the last 48 hours.  CSF Studies: No results for input(s): ALIQUT, APPEARCSF, COLORCSF, CSFWBC, CSFRBC, GLUCCSF, LDHCSF, PROTEINCSF, VDRLCSF in the last 48 hours.     CSF:  Negative: HSV, WNV, EBV   In process: VDRL, HH6V, CMV, paraneoplastic panel    Inflammatory Markers: No results for input(s): SEDRATE, CRP, PROCAL in the last 48 hours.  Prealbumin: No results for input(s): PREALBUMIN in the last 48 hours.  Respiratory Culture: No results for input(s): GSRESP, RESPIRATORYC in the last 48 hours.  Urine Culture: No results for input(s): LABURIN in the last 48 hours.  Urine Studies:   Recent Labs  Lab 09/11/17  1231   COLORU Yellow   APPEARANCEUA Cloudy*   PHUR 5.0   SPECGRAV 1.010   PROTEINUA Negative   GLUCUA Negative   KETONESU Negative   BILIRUBINUA Negative   OCCULTUA 2+*   NITRITE Negative   UROBILINOGEN Negative   LEUKOCYTESUR 1+*   RBCUA 19*   WBCUA 6*   BACTERIA Occasional   SQUAMEPITHEL 30     HIV negative   RPR non-reactive     Awaiting 24 hr vEEG     All pertinent lab results from the past 24 hours have been reviewed.    Significant Imaging: I have reviewed and interpreted all pertinent imaging results/findings within the past 24 hours.

## 2017-09-12 NOTE — PROGRESS NOTES
Paged IMB to notify of patient BP = 201/78.  CARA Gomez NP to read chart and put in orders.  Order for hydralazine 25mg ordered, then discontinued by provider.   0700  Reported to day shift nurse that provider must have changed his mind.

## 2017-09-12 NOTE — ASSESSMENT & PLAN NOTE
Presented to [facility] 9/4 with acute change in mental status and agitation. 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 LP on 9/8-CSF with 2 WBC, 133 RBC, 270 protein, 73 glucose. MRI Brain 9/8 with chronic microvascular ischemic changes and small area of cystic encephalomalacia in the deep right frontal lobe c/w remote lacunar infarct.    9/11-Daughter reports mental status continues to fluctuate, but per team her mental status has improved since admission.   9/12- Has not received any Seroquel yet, awaiting 24 hr vEEG, HSV in CSF resulted negative      Recommendations:  -planned for transfer to 7th floor for 24 hr vEEG   -Start Seroquel 50 mg qhs and regulate sleep/wake cycle with delirium precautions

## 2017-09-13 LAB
ACE CSF-CCNC: 3.6 U/L (ref 0–2.5)
ANION GAP SERPL CALC-SCNC: 11 MMOL/L
BASOPHILS # BLD AUTO: 0.02 K/UL
BASOPHILS NFR BLD: 0.3 %
BUN SERPL-MCNC: 13 MG/DL
CALCIUM SERPL-MCNC: 8.9 MG/DL
CHLORIDE SERPL-SCNC: 100 MMOL/L
CMV SPEC QL SHELL VIAL CULT: NO GROWTH
CO2 SERPL-SCNC: 31 MMOL/L
CREAT SERPL-MCNC: 1.3 MG/DL
DIFFERENTIAL METHOD: ABNORMAL
EOSINOPHIL # BLD AUTO: 0.1 K/UL
EOSINOPHIL NFR BLD: 2.1 %
ERYTHROCYTE [DISTWIDTH] IN BLOOD BY AUTOMATED COUNT: 13.8 %
EST. GFR  (AFRICAN AMERICAN): 47 ML/MIN/1.73 M^2
EST. GFR  (NON AFRICAN AMERICAN): 40.8 ML/MIN/1.73 M^2
GLUCOSE SERPL-MCNC: 114 MG/DL
GRAM STN SPEC: NORMAL
HCT VFR BLD AUTO: 37.3 %
HGB BLD-MCNC: 12 G/DL
HH6V DNA INTERPRETATION, CSF: NOT DETECTED
HH6V QUANT (COPY/ML),CSF: <1000 CPY/ML
HH6V QUANT (LOG COPY/ML), CSF: <3 LOG
HH6V SOURCE, CSF: NORMAL
HH6V TYPE, CSF: NORMAL
HSV1, PCR, CSF: NEGATIVE
HSV2, PCR, CSF: NEGATIVE
JCPYV DNA CSF QL NAA+PROBE: NEGATIVE
LYMPHOCYTES # BLD AUTO: 1.9 K/UL
LYMPHOCYTES NFR BLD: 28.6 %
MAGNESIUM SERPL-MCNC: 1.7 MG/DL
MCH RBC QN AUTO: 28.6 PG
MCHC RBC AUTO-ENTMCNC: 32.2 G/DL
MCV RBC AUTO: 89 FL
MONOCYTES # BLD AUTO: 1.2 K/UL
MONOCYTES NFR BLD: 18 %
NEUTROPHILS # BLD AUTO: 3.4 K/UL
NEUTROPHILS NFR BLD: 50.7 %
PHOSPHATE SERPL-MCNC: 2.6 MG/DL
PLATELET # BLD AUTO: 192 K/UL
PMV BLD AUTO: 10.3 FL
POCT GLUCOSE: 124 MG/DL (ref 70–110)
POCT GLUCOSE: 125 MG/DL (ref 70–110)
POCT GLUCOSE: 145 MG/DL (ref 70–110)
POCT GLUCOSE: 189 MG/DL (ref 70–110)
POTASSIUM SERPL-SCNC: 3.8 MMOL/L
RBC # BLD AUTO: 4.2 M/UL
SODIUM SERPL-SCNC: 142 MMOL/L
SPECIMEN SOURCE: NORMAL
WBC # BLD AUTO: 6.61 K/UL
WNV RNA SPEC QL NAA+PROBE: NEGATIVE

## 2017-09-13 PROCEDURE — 99233 SBSQ HOSP IP/OBS HIGH 50: CPT | Mod: ,,, | Performed by: INTERNAL MEDICINE

## 2017-09-13 PROCEDURE — 97530 THERAPEUTIC ACTIVITIES: CPT

## 2017-09-13 PROCEDURE — 97535 SELF CARE MNGMENT TRAINING: CPT

## 2017-09-13 PROCEDURE — 25000003 PHARM REV CODE 250: Performed by: HOSPITALIST

## 2017-09-13 PROCEDURE — 84100 ASSAY OF PHOSPHORUS: CPT

## 2017-09-13 PROCEDURE — 97110 THERAPEUTIC EXERCISES: CPT

## 2017-09-13 PROCEDURE — 80048 BASIC METABOLIC PNL TOTAL CA: CPT

## 2017-09-13 PROCEDURE — 11000001 HC ACUTE MED/SURG PRIVATE ROOM

## 2017-09-13 PROCEDURE — 85025 COMPLETE CBC W/AUTO DIFF WBC: CPT

## 2017-09-13 PROCEDURE — G8997 SWALLOW GOAL STATUS: HCPCS | Mod: CI

## 2017-09-13 PROCEDURE — 63600175 PHARM REV CODE 636 W HCPCS: Performed by: INTERNAL MEDICINE

## 2017-09-13 PROCEDURE — 36415 COLL VENOUS BLD VENIPUNCTURE: CPT

## 2017-09-13 PROCEDURE — 25000003 PHARM REV CODE 250: Performed by: PHYSICIAN ASSISTANT

## 2017-09-13 PROCEDURE — G8998 SWALLOW D/C STATUS: HCPCS | Mod: CI

## 2017-09-13 PROCEDURE — 83735 ASSAY OF MAGNESIUM: CPT

## 2017-09-13 PROCEDURE — 25000003 PHARM REV CODE 250: Performed by: NURSE PRACTITIONER

## 2017-09-13 PROCEDURE — 92526 ORAL FUNCTION THERAPY: CPT

## 2017-09-13 PROCEDURE — G8996 SWALLOW CURRENT STATUS: HCPCS | Mod: CI

## 2017-09-13 RX ADMIN — CARVEDILOL 12.5 MG: 12.5 TABLET, FILM COATED ORAL at 08:09

## 2017-09-13 RX ADMIN — HYDRALAZINE HYDROCHLORIDE 25 MG: 25 TABLET, FILM COATED ORAL at 09:09

## 2017-09-13 RX ADMIN — ACETAMINOPHEN 650 MG: 325 TABLET ORAL at 04:09

## 2017-09-13 RX ADMIN — POTASSIUM CHLORIDE 10 MEQ: 750 CAPSULE, EXTENDED RELEASE ORAL at 08:09

## 2017-09-13 RX ADMIN — ENOXAPARIN SODIUM 40 MG: 100 INJECTION SUBCUTANEOUS at 04:09

## 2017-09-13 RX ADMIN — LEVOTHYROXINE SODIUM 50 MCG: 50 TABLET ORAL at 06:09

## 2017-09-13 RX ADMIN — CARVEDILOL 12.5 MG: 12.5 TABLET, FILM COATED ORAL at 09:09

## 2017-09-13 RX ADMIN — ASPIRIN 325 MG ORAL TABLET 325 MG: 325 PILL ORAL at 08:09

## 2017-09-13 RX ADMIN — LOSARTAN POTASSIUM 50 MG: 50 TABLET, FILM COATED ORAL at 08:09

## 2017-09-13 NOTE — SUBJECTIVE & OBJECTIVE
Interval History: woke up this am and talking, but still confused    Review of Systems   Constitutional: Negative for chills, diaphoresis, fatigue and fever.   HENT: Negative for congestion, sinus pain and trouble swallowing.    Respiratory: Negative for cough and shortness of breath.    Cardiovascular: Negative for chest pain and leg swelling.   Gastrointestinal: Negative for abdominal distention, abdominal pain, blood in stool, constipation, diarrhea and vomiting.   Genitourinary: Positive for difficulty urinating.   Musculoskeletal: Negative for neck pain and neck stiffness.   Skin: Negative for rash.   Neurological: Negative for tremors, weakness, numbness and headaches.   Psychiatric/Behavioral: Negative for agitation, behavioral problems and confusion.     Objective:     Vital Signs (Most Recent):  Temp: 98.5 °F (36.9 °C) (09/13/17 0735)  Pulse: 63 (09/13/17 0735)  Resp: 18 (09/13/17 0735)  BP: (!) 148/69 (09/13/17 0735)  SpO2: (!) 94 % (09/13/17 0735) Vital Signs (24h Range):  Temp:  [98.4 °F (36.9 °C)-98.8 °F (37.1 °C)] 98.5 °F (36.9 °C)  Pulse:  [63-69] 63  Resp:  [16-20] 18  SpO2:  [94 %-97 %] 94 %  BP: (148-188)/() 148/69     Weight: (!) 145.6 kg (321 lb)  Body mass index is 55.1 kg/m².    Intake/Output Summary (Last 24 hours) at 09/13/17 1018  Last data filed at 09/12/17 2354   Gross per 24 hour   Intake                0 ml   Output              600 ml   Net             -600 ml      Physical Exam   Constitutional: She is oriented to person, place, and time. She appears well-developed and well-nourished.   obese   HENT:   Head: Normocephalic and atraumatic.   Mouth/Throat: Oropharynx is clear and moist.   Eyes: Conjunctivae and EOM are normal. Pupils are equal, round, and reactive to light. No scleral icterus.   Neck: Normal range of motion. Neck supple. No thyromegaly present.   Cardiovascular: Normal rate, regular rhythm and normal heart sounds.    Pulmonary/Chest: Effort normal and breath sounds  normal. No respiratory distress. She has no wheezes. She has no rales.   Abdominal: Soft. Bowel sounds are normal. She exhibits no distension and no mass. There is no tenderness. There is no rebound and no guarding. No hernia.   Musculoskeletal: Normal range of motion. She exhibits edema. She exhibits no deformity.   Lymphadenopathy:     She has no cervical adenopathy.   Neurological: She is alert and oriented to person, place, and time. She has normal strength.   Psychiatric: She has a normal mood and affect. Her behavior is normal. Her speech is delayed. Cognition and memory are impaired. She exhibits abnormal recent memory and abnormal remote memory.   Sundowning at night She is attentive.       Significant Labs:   CBC:     Recent Labs  Lab 09/12/17  0541 09/13/17  0356   WBC 5.89 6.61   HGB 12.2 12.0   HCT 37.0 37.3    192     CMP:     Recent Labs  Lab 09/12/17  0541 09/13/17  0356    142   K 3.9 3.8   CL 99 100   CO2 33* 31*   * 114*   BUN 11 13   CREATININE 1.3 1.3   CALCIUM 9.1 8.9   ANIONGAP 10 11   EGFRNONAA 40.8* 40.8*

## 2017-09-13 NOTE — PROGRESS NOTES
"[facility] Hospital Medicine  Progress Note    Patient Name: Blue Cassidy  MRN: [id number]  Patient Class: IP- Inpatient   Admission Date: 9/6/2017  Length of Stay: 7 days  Attending Physician: Torrey Maya MD  Primary Care Provider: Primary Doctor [facility] Medicine Team: [facility] HOSP MED B Torrey Maya MD    Subjective:     Principal Problem:Acute encephalopathy    HPI:  Ms. Cassidy is a 70yo lady with a past medical history of   She now comes as a transfer from [facility] after being admitted there on 9/4/17 with acute altered mental status with agitation.  She had a CT head done there at admission which revealed, "right parietal lobe ischemia."  She was also found to have a, "slightly abnormal UA" and was started on Rocephin 1g iv q24 hours.  She was admitted to the IM service and Neurology was consulted, who recommended an MRI brain.  This was done, but was of such a poor quality that is was un-interpretable.  The night after she was admitted she, "had two focal seizures.  The patient was placed on IV Keppra and an EEG was obtained this morning."  She also had to be given 2mg of iv haldol to control some combativeness.  Since receiving all of these therapies, she has been lethargic and unable to take anything by mouth.       Hospital Course:  9/7 - pt more alert, still not back to baseline mental status accord to family,  Neurology saw pt this am,  2 am, labs collected,  EEG ordered  9/8 - spoke w anesthesia fellow yesterday,  Place another consutlt for anesthesia today for LP and MRI under sedation,  Spoke to neurology fellow and he is going to set up procedures.  9/9- up in chair, verbal and interacting with caretakers, eating a ookie by herself.  LP + protein, 270, MRI - consistent w chronic microvasc disease, and old small stroke w encephalomalasia right fromtal lobe and cerebro volume loss.   K 3.2  9/10 - developed acute urinary retention, holley " placed, discussed care of kishan Hall and Malgorzata, her family present.  Work excuse given to Malgorzata Whalen.  Pt still confused, still waitng on HSV and other lab. See palliative care note below  9/11 - family at bedside reports pt did get agitated but they were able to hansal it.  Will add seroquel prn    Interval History: woke up this am and talking, but still confused    Review of Systems   Constitutional: Negative for chills, diaphoresis, fatigue and fever.   HENT: Negative for congestion, sinus pain and trouble swallowing.    Respiratory: Negative for cough and shortness of breath.    Cardiovascular: Negative for chest pain and leg swelling.   Gastrointestinal: Negative for abdominal distention, abdominal pain, blood in stool, constipation, diarrhea and vomiting.   Genitourinary: Positive for difficulty urinating.   Musculoskeletal: Negative for neck pain and neck stiffness.   Skin: Negative for rash.   Neurological: Negative for tremors, weakness, numbness and headaches.   Psychiatric/Behavioral: Negative for agitation, behavioral problems and confusion.     Objective:     Vital Signs (Most Recent):  Temp: 98.5 °F (36.9 °C) (09/13/17 0735)  Pulse: 63 (09/13/17 0735)  Resp: 18 (09/13/17 0735)  BP: (!) 148/69 (09/13/17 0735)  SpO2: (!) 94 % (09/13/17 0735) Vital Signs (24h Range):  Temp:  [98.4 °F (36.9 °C)-98.8 °F (37.1 °C)] 98.5 °F (36.9 °C)  Pulse:  [63-69] 63  Resp:  [16-20] 18  SpO2:  [94 %-97 %] 94 %  BP: (148-188)/() 148/69     Weight: (!) 145.6 kg (321 lb)  Body mass index is 55.1 kg/m².    Intake/Output Summary (Last 24 hours) at 09/13/17 1018  Last data filed at 09/12/17 2354   Gross per 24 hour   Intake                0 ml   Output              600 ml   Net             -600 ml      Physical Exam   Constitutional: She is oriented to person, place, and time. She appears well-developed and well-nourished.   obese   HENT:   Head: Normocephalic and atraumatic.   Mouth/Throat: Oropharynx is clear and  moist.   Eyes: Conjunctivae and EOM are normal. Pupils are equal, round, and reactive to light. No scleral icterus.   Neck: Normal range of motion. Neck supple. No thyromegaly present.   Cardiovascular: Normal rate, regular rhythm and normal heart sounds.    Pulmonary/Chest: Effort normal and breath sounds normal. No respiratory distress. She has no wheezes. She has no rales.   Abdominal: Soft. Bowel sounds are normal. She exhibits no distension and no mass. There is no tenderness. There is no rebound and no guarding. No hernia.   Musculoskeletal: Normal range of motion. She exhibits edema. She exhibits no deformity.   Lymphadenopathy:     She has no cervical adenopathy.   Neurological: She is alert and oriented to person, place, and time. She has normal strength.   Psychiatric: She has a normal mood and affect. Her behavior is normal. Her speech is delayed. Cognition and memory are impaired. She exhibits abnormal recent memory and abnormal remote memory.   Sundowning at night She is attentive.       Significant Labs:   CBC:     Recent Labs  Lab 09/12/17  0541 09/13/17  0356   WBC 5.89 6.61   HGB 12.2 12.0   HCT 37.0 37.3    192     CMP:     Recent Labs  Lab 09/12/17  0541 09/13/17  0356    142   K 3.9 3.8   CL 99 100   CO2 33* 31*   * 114*   BUN 11 13   CREATININE 1.3 1.3   CALCIUM 9.1 8.9   ANIONGAP 10 11   EGFRNONAA 40.8* 40.8*           Assessment/Plan:      * Acute encephalopathy    9/11 - waiting on HSV,if positive will need 8 weeks of IV acyclovir,   will transfer to rehab when accepted,  F/u neurology for rest of results and viral studies. Add prn seoquel q HS for agitation.   9/10 -  Discussed nature of brain disorder w Malgorzata and Isabel, who are asking questions.  Pt has acute encephalitis, which may improve,has evidence of mild dementia and evidence of old stroke per MRI, and active delerium presently, which may also improve.  Discussed cardiac effects of anti-psychotic agents and  would use them only if behavior can't be controlled.  They intend on taking her home. MS likely to improve in a stable environment.   24 hour EEG ordered.  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 Seizure upon admit  - Paraneoplastic process   - Other infection (UTI)  - on empiric acyclovir     On IVFs for hydration, npo     - Fall precaution   - Check B12, MMA, NH3, B1, B6, HIV, RPR, ESR, CRP, TPO, ROXANN  - collected  - Urine drug screen - ordered  - EEG  - ordered  - Autoimmune encephalitis panel in serum - ordered  - MRI brain with and without contrast   - LP w/ CSF analysis:   · Cell count/dif, protein, glucose, ACE    · Infectious panel: arbovirus, CMV, HBV, EBV, HSV, HHV-6, HIV, AMMON Virus(PML), [place] equine, Varicella zoster, West Nile, VDRL CSF, crypto, CJD 14-3-3    · Culture: bacterial/fungal cx  · Paraneoplastic panel   ·   Patient need to be sedated for MRI and LP.   Would need anaesthesia help considering patient need sedation and difficult LP due to body habitus     Abnormal CT head with chronic microvascular ischemic changes  -1.2 cm area of hypodensity in the right parietal lobe without obvious cytotoxic edema, given irregular margins and confinement to white matter.    -Findings most likely represent components of chronic small vessel ischemic disease.  -Nonspecific hypodensity in the right cerebellar hemisphere.  MRI of the brain without and with contrast obtained for further evaluation        Palliative care encounter    Non-hospice Palliative Care:   Does patient have Capacity? no  Goals- improvement of MS w treatment of encephalitis  Code Status- FULL, we discussed code status and  family contemplating change  Pain management-  stable  Prognosis-  good  Family discussions-9/10 - Isabel and Malgorzata asked me about hospice.  They want it in order to get DME at home.  Pt's  did well on hospice and did not want heroic measures.  They think she has the same wishes but want to discuss with other family members.  Functional Status - up in chair w assistance, some combativeness last night, chronic indwelling holley or I&O caths needed.    Post acute care plan:  home w HH  And DME               Acute urinary retention    Holley placed  placement may improve delirium  Needs to keep it for home  Discussed management of holley w several family members - can do a voiding trial with monthly holley changes.            Hypokalemia    9/8 - kadur 40 x 2        CHF (congestive heart failure)    stable          HTN (hypertension), malignant    -Start iv lopressor 5mg iv q6 hours  -Telemetry  -2D Echo with cfd    9/7 - 153/81  9/10 - stable, 158/ 70, resume coreg and cozzar  9/11 - 145/98 : increased cozaar to 50 daily        Type 2 diabetes mellitus with complication    Recent Labs      09/10/17   0801  09/10/17   1225  09/10/17   1653  09/10/17   2101  09/11/17   0730  09/11/17   1139   POCTGLUCOSE  139*  164*  147*  160*  103  190*     On SS          Abnormal CT of the head    9/6/17:  1.2 cm area of hypodensity in the right parietal lobe without obvious cytotoxic edema, given irregular margins and confinement to white matter.  Findings most likely represent components of chronic small vessel ischemic disease.  Nonspecific hypodensity in the right cerebellar hemisphere.  MRI of the brain without and with contrast obtained for further evaluation.    9/8/17 MRI - consistent w chronic microvasc disease, and old small stroke w encephalomalasia right fromtal lobe and cerebro volume loss.          Seizure    9/11 - stable in hospital, MS somewhat improved  New onset  keppra   eeg  Neurology consulted          Urinary tract infection without hematuria     Rocephin started  9/9 - day 3, no cultures here, UA was abnormal from outside hospital.  Will dc rocephin and reculture the urine, repeat UA.  No evidece of bacterial meinigitis  Needs to keep foely for chronic urinary retention.  Family can't preform I&O caths given her MS.  Voiding trial monthly w holley changes.          Delirium    Suspect seizure with viral encephalitis  keppra started              VTE Risk Mitigation         Ordered     enoxaparin injection 40 mg  Daily     Route:  Subcutaneous        09/06/17 1954     Medium Risk of VTE  Once      09/06/17 1954     Place sequential compression device  Until discontinued      09/06/17 1954     Place ROSITA hose  Until discontinued      09/06/17 1954              Torrey Maya MD  Department of Hospital Medicine   [facility]

## 2017-09-13 NOTE — PLAN OF CARE
Problem: SLP Goal  Goal: SLP Goal  Speech Language Pathology Goals  Goals expected to be met by 9/14    1.  Pt will tolerate Dental Soft diet w/thin liquids w/ no overt s/s of aspiration.       Outcome: Outcome(s) achieved Date Met: 09/13/17  Pt seen to assess diet tolerance with breakfast tray. Pt impulsive and requires cues to slow down. No overt s/s of aspiration noted. Family and pt education provided. No further skilled ST services warranted at this time. Please re-consult as needed.   MAX Mc., CCC-SLP  Pager: [phone]  09/13/2017

## 2017-09-13 NOTE — PLAN OF CARE
Discharge planning: Referral in place to [facility]. Message from Lyla at [facility] that current PT note is needed(PT assigned today) and patient is pending EEG.

## 2017-09-13 NOTE — PT/OT/SLP PROGRESS
Speech Language Pathology  Dysphagia Treatment  Discharge    Blue Cassidy   MRN: [id number]   922/922 A    Admitting Diagnosis: Acute encephalopathy    Diet recommendations: Solid Diet Level: Mechanical soft  Liquid Diet Level: Thin   · Feed only when awake/alert,   · HOB to 90 degrees,   · Small bites/sips,   · 1 bite/sip at a time,   · Check for pocketing/oral residue,   · Remain upright 30 minutes post meal,   · Meds whole 1 at a time   · Eliminate distractions  · Monitor with meals to ensure compliance with safe swallowing precautions  · When present in hospital, dentures present for all po intake    Continue to monitor for signs and symptoms of aspiration and discontinue oral feeding should you notice any of the following: watery eyes, reddened facial area, wet vocal quality, increased work of breathing, change in respiratory status, increased congestion, coughing, fever, etc.    SLP Treatment Date: 09/13/17  Speech Start Time: 0805     Speech Stop Time: 0830     Speech Total (min): 25 min       TREATMENT BILLABLE MINUTES:  Treatment Swallowing Dysfunction 15 and Seld Care/Home Management Training 10    Has the patient been evaluated by SLP for swallowing? : Yes  Keep patient NPO?: No   General Precautions: Standard, aspiration, fall  Current Respiratory Status: nasal cannula       Subjective:  Pt sleeping upon ST entry. Easily arousable.           Objective:     Pt seen to assess diet tolerance. HOB elevated as high as possible. HOB only elevated to 60 degrees. Family reported that is as high as it has been going. SLP utilized pillows and folded blanket to assist in better positioning, however, positioning not optimal for feeding. Charge RN notified who requested a ticket for bed to be fixed. Per family, pt is transferring rooms soon.   Family reported pt has been typically seated upright in chair for meals.   Pt assessed with self fed bites of eggs, toast, sausage, and apple sauce. Dentures not present in  hospital yet. Pt's daughter reported someone is bringing dentures this afternoon. Noted prolonged mastication and taking multiple bites at a time before swallowing despite clinician's cues to slow down. Pt became agitated with SLP. SLP provided pt and family education on the s/s and risks of aspiration, safe swallowing precautions, importance of taking 1 bite at a time, and d/c from ST. Pt and family verbalized understanding of all discussed. SLP encouraged family to request a reconsult with ST should she notice and s/s of aspiration.       Assessment:  Blue Cassidy is a 71 y.o. female with a medical diagnosis of Acute encephalopathy and mild oral dysphagia negatively impacted by lack of dentures present in hospital. Pt presenting with no overt s/s of aspiration or significant pharyngeal dysphagia with all consistencies trialed per subjective bedside assessment. Silent aspiration cannot be ruled out at the bedside. Pt is at a risk to aspirate 2/2 impulsivity with taking multiple bites at a time. Pt would benefit from being monitored with meals. No further skilled ST services warranted at this time. Please re-consult as needed.     Discharge recommendations: Discharge Facility/Level Of Care Needs: nursing facility, skilled     Goals:    SLP Goals     Not on file          Multidisciplinary Problems (Resolved)        Problem: SLP Goal    Goal Priority Disciplines Outcome   SLP Goal   (Resolved)     SLP Outcome(s) achieved   Description:  Speech Language Pathology Goals  Goals expected to be met by 9/14    1.  Pt will tolerate Dental Soft diet w/thin liquids w/ no overt s/s of aspiration.                         Plan:   Plan of Care expires: 10/06/17  Plan of Care reviewed with: patient, family  SLP Follow-up?: No         SLP G-Codes  Functional Assessment Tool Used: noms  Score: 6  Functional Limitations: Swallowing  Swallow Current Status (): CI  Swallow Goal Status (): CI  Swallow Discharge Status  (): ASHKAN Helton, CCC-SLP   Pager: [phone]  09/13/2017

## 2017-09-13 NOTE — PT/OT/SLP PROGRESS
"Physical Therapy  Treatment    Blue Cassidy   MRN: [id number]   Admitting Diagnosis: Acute encephalopathy    PT Received On: 09/13/17  PT Start Time: 1340     PT Stop Time: 1408    PT Total Time (min): 28 min       Billable Minutes:  Therapeutic Activity  20 and Therapeutic Exercise 8    Treatment Type: Treatment  PT/PTA: PTA     PTA Visit Number: 1       General Precautions: Standard, aspiration, fall  Orthopedic Precautions: N/A   Braces:      Do you have any cultural, spiritual, Advent conflicts, given your current situation?: none stated    Subjective:  Communicated with NSG prior to session.  Patient states " I have pain on my right ankle when I'm standing".    Pain/Comfort  Pain Rating 1: 3/10  Location - Side 1: Right  Location - Orientation 1: generalized  Location 1: ankle  Pain Addressed 1: Reposition, Distraction, Cessation of Activity  Pain Rating Post-Intervention 1: 1/10    Objective:   Patient found with: bed alarm, oxygen, telemetry    Functional Mobility:  Bed Mobility:   Rolling/Turning to Left: Minimum assistance, With side rail  Scooting/Bridging: Stand by Assistance  Supine to Sit: Minimum Assistance, With side rail  Sit to Supine: Moderate Assistance, With leg lift    Transfers:  Sit <> Stand Assistance: Maximum Assistance  Sit <> Stand Assistive Device: Rolling Walker    Gait:   Gait Distance: 3 steps to head of bed with max cues for safety  Assistance 1: Moderate assistance  Gait Assistive Device: Rolling walker  Gait Pattern: swing-to gait  Gait Deviation(s): decreased brii, increased time in double stance, decreased velocity of limb motion, decreased step length, decreased toe-to-floor clearance, decreased weight-shifting ability, forward lean    Stairs:      Balance:   Static Sit: GOOD-: Takes MODERATE challenges from all directions but inconsistently  Dynamic Sit: GOOD-: Maintains balance through MODERATE excursions of active trunk movement,     Static Stand: POOR+: Needs MINIMAL " assist to maintain  Dynamic stand: POOR: N/A     Therapeutic Activities and Exercises:  Donned/Doffed a second gown. Attempted transfer to bedside chair, but unable to fall risk. Static standing balance activity using RW for support 4x60 secs with min assist and max. Cues to to maintain an upright posture. There ex in supine: SAQ, HS AND AP 2X12 REPS B LE.     AM-PAC 6 CLICK MOBILITY  How much help from another person does this patient currently need?   1 = Unable, Total/Dependent Assistance  2 = A lot, Maximum/Moderate Assistance  3 = A little, Minimum/Contact Guard/Supervision  4 = None, Modified [place]/Independent    Turning over in bed (including adjusting bedclothes, sheets and blankets)?: 3  Sitting down on and standing up from a chair with arms (e.g., wheelchair, bedside commode, etc.): 2  Moving from lying on back to sitting on the side of the bed?: 2  Moving to and from a bed to a chair (including a wheelchair)?: 2  Need to walk in hospital room?: 1  Climbing 3-5 steps with a railing?: 1  Total Score: 11    AM-PAC Raw Score CMS G-Code Modifier Level of Impairment Assistance   6 % Total / Unable   7 - 9 CM 80 - 100% Maximal Assist   10 - 14 CL 60 - 80% Moderate Assist   15 - 19 CK 40 - 60% Moderate Assist   20 - 22 CJ 20 - 40% Minimal Assist   23 CI 1-20% SBA / CGA   24 CH 0% Independent/ Mod I     Patient left with bed in chair position with all lines intact, call button in reach and bed alarm on.    Assessment:  Blue Cassidy is a 71 y.o. female with a medical diagnosis of Acute encephalopathy and presents with decreased functional mobility. Patient required from mod to max assistance to transfer from sit to stand and initially appeared very anxious and with limited safety awareness. Patient with a tendency to lean fwd in standing and not using her arms to push up from the RW. Patient would benefit from continued P.T. To address deficits.    Rehab identified problem list/impairments:  Rehab identified problem list/impairments: impaired endurance, impaired self care skills, impaired functional mobilty, impaired balance, impaired cognition, decreased lower extremity function, decreased safety awareness, decreased ROM, edema    Rehab potential is fair.    Activity tolerance: Fair    Discharge recommendations: Discharge Facility/Level Of Care Needs: nursing facility, skilled     Barriers to discharge:      Equipment recommendations: Equipment Needed After Discharge: other (see comments) (TBD at next level of care)     GOALS:    Physical Therapy Goals        Problem: Physical Therapy Goal    Goal Priority Disciplines Outcome Goal Variances Interventions   Physical Therapy Goal     PT/OT, PT Ongoing (interventions implemented as appropriate)     Description:  Goals to be met by: 2017    Patient will increase functional independence with mobility by performin. Supine to sit with MInimal Assistance  2. Sit to supine with MInimal Assistance  3. Rolling to Left and Right with Minimal Assistance.  4. Sit to stand transfer with Contact Guard Assistance  5. Bed to chair transfer with Contact Guard Assistance using Rolling Walker  6. Gait  x 25 feet with Contact Guard Assistance using Rolling Walker.                       PLAN:    Patient to be seen 3 x/week  to address the above listed problems via gait training, therapeutic activities, therapeutic exercises  Plan of Care expires: 10/09/17  Plan of Care reviewed with: patient         Santo Ibarra, PTA  2017

## 2017-09-13 NOTE — PLAN OF CARE
Problem: Patient Care Overview  Goal: Plan of Care Review  Outcome: Ongoing (interventions implemented as appropriate)  POC reviewed with pt and family. AAO x1 . glucose monitoring, holley catheter in place   Pt remained free from falls. Questions and concerns addressed. Pt progressing towards goals. Will continue to monitor. See flow sheets for full assessment and VS

## 2017-09-13 NOTE — PLAN OF CARE
Problem: Physical Therapy Goal  Goal: Physical Therapy Goal  Goals to be met by: 2017    Patient will increase functional independence with mobility by performin. Supine to sit with MInimal Assistance  2. Sit to supine with MInimal Assistance  3. Rolling to Left and Right with Minimal Assistance.  4. Sit to stand transfer with Contact Guard Assistance  5. Bed to chair transfer with Contact Guard Assistance using Rolling Walker  6. Gait  x 25 feet with Contact Guard Assistance using Rolling Walker.      Outcome: Ongoing (interventions implemented as appropriate)  Inconsistent ability to transfer and decreased safety awareness limits safe mobility.

## 2017-09-13 NOTE — ASSESSMENT & PLAN NOTE
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 Add prn seoquel q HS for agitation.   9/10 -  Discussed nature of brain disorder w Malgorzata and Isabel, who are asking questions.  Pt has acute encephalitis, which may improve,has evidence of mild dementia and evidence of old stroke per MRI, and active delerium presently, which may also improve.  Discussed cardiac effects of anti-psychotic agents and would use them only if behavior can't be controlled.  They intend on taking her home. MS likely to improve in a stable environment.   24 hour EEG ordered.  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 Seizure upon admit  - Paraneoplastic process   - Other infection (UTI)  - on empiric acyclovir     On IVFs for hydration, npo     - Fall precaution   - Check B12, MMA, NH3, B1, B6, HIV, RPR, ESR, CRP, TPO, ROXANN  - collected  - Urine drug screen - ordered  - EEG  - ordered  - Autoimmune encephalitis panel in serum - ordered  - MRI brain with and without contrast   - LP w/ CSF analysis:   · Cell count/dif, protein, glucose, ACE    · Infectious panel: arbovirus, CMV, HBV, EBV, HSV, HHV-6, HIV, AMMON Virus(PML), [place] equine, Varicella zoster, West Nile, VDRL CSF, crypto, CJD 14-3-3    · Culture: bacterial/fungal cx  · Paraneoplastic panel   ·   Patient need to be sedated for MRI and LP.   Would need anaesthesia help considering patient need sedation and difficult LP due to body habitus     Abnormal CT head with chronic microvascular ischemic changes  -1.2 cm area of hypodensity in the right parietal lobe without obvious cytotoxic edema, given irregular  margins and confinement to white matter.    -Findings most likely represent components of chronic small vessel ischemic disease.  -Nonspecific hypodensity in the right cerebellar hemisphere.  MRI of the brain without and with contrast obtained for further evaluation

## 2017-09-14 LAB
ANION GAP SERPL CALC-SCNC: 11 MMOL/L
BASOPHILS # BLD AUTO: 0.03 K/UL
BASOPHILS NFR BLD: 0.5 %
BUN SERPL-MCNC: 13 MG/DL
CALCIUM SERPL-MCNC: 9.1 MG/DL
CHLORIDE SERPL-SCNC: 99 MMOL/L
CO2 SERPL-SCNC: 31 MMOL/L
CREAT SERPL-MCNC: 1.2 MG/DL
DIFFERENTIAL METHOD: ABNORMAL
EOSINOPHIL # BLD AUTO: 0.2 K/UL
EOSINOPHIL NFR BLD: 2.8 %
ERYTHROCYTE [DISTWIDTH] IN BLOOD BY AUTOMATED COUNT: 13.8 %
EST. GFR  (AFRICAN AMERICAN): 51.8 ML/MIN/1.73 M^2
EST. GFR  (NON AFRICAN AMERICAN): 44.9 ML/MIN/1.73 M^2
GLUCOSE SERPL-MCNC: 128 MG/DL
HCT VFR BLD AUTO: 38.3 %
HGB BLD-MCNC: 12.1 G/DL
LYMPHOCYTES # BLD AUTO: 1.9 K/UL
LYMPHOCYTES NFR BLD: 33.9 %
MAGNESIUM SERPL-MCNC: 1.7 MG/DL
MCH RBC QN AUTO: 28.3 PG
MCHC RBC AUTO-ENTMCNC: 31.6 G/DL
MCV RBC AUTO: 90 FL
MONOCYTES # BLD AUTO: 0.9 K/UL
MONOCYTES NFR BLD: 16 %
NEUTROPHILS # BLD AUTO: 2.6 K/UL
NEUTROPHILS NFR BLD: 46.6 %
PHOSPHATE SERPL-MCNC: 2.8 MG/DL
PLATELET # BLD AUTO: 195 K/UL
PMV BLD AUTO: 10.6 FL
POCT GLUCOSE: 140 MG/DL (ref 70–110)
POCT GLUCOSE: 170 MG/DL (ref 70–110)
POTASSIUM SERPL-SCNC: 3.5 MMOL/L
RBC # BLD AUTO: 4.27 M/UL
SODIUM SERPL-SCNC: 141 MMOL/L
WBC # BLD AUTO: 5.63 K/UL

## 2017-09-14 PROCEDURE — 83735 ASSAY OF MAGNESIUM: CPT

## 2017-09-14 PROCEDURE — 11000001 HC ACUTE MED/SURG PRIVATE ROOM

## 2017-09-14 PROCEDURE — 25000003 PHARM REV CODE 250: Performed by: NURSE PRACTITIONER

## 2017-09-14 PROCEDURE — 84100 ASSAY OF PHOSPHORUS: CPT

## 2017-09-14 PROCEDURE — 63600175 PHARM REV CODE 636 W HCPCS: Performed by: INTERNAL MEDICINE

## 2017-09-14 PROCEDURE — 25000003 PHARM REV CODE 250: Performed by: PHYSICIAN ASSISTANT

## 2017-09-14 PROCEDURE — 36415 COLL VENOUS BLD VENIPUNCTURE: CPT

## 2017-09-14 PROCEDURE — 97116 GAIT TRAINING THERAPY: CPT

## 2017-09-14 PROCEDURE — 85025 COMPLETE CBC W/AUTO DIFF WBC: CPT

## 2017-09-14 PROCEDURE — 99233 SBSQ HOSP IP/OBS HIGH 50: CPT | Mod: ,,, | Performed by: INTERNAL MEDICINE

## 2017-09-14 PROCEDURE — 25000003 PHARM REV CODE 250: Performed by: HOSPITALIST

## 2017-09-14 PROCEDURE — 97530 THERAPEUTIC ACTIVITIES: CPT

## 2017-09-14 PROCEDURE — 25000003 PHARM REV CODE 250: Performed by: INTERNAL MEDICINE

## 2017-09-14 PROCEDURE — 97110 THERAPEUTIC EXERCISES: CPT

## 2017-09-14 PROCEDURE — 80048 BASIC METABOLIC PNL TOTAL CA: CPT

## 2017-09-14 RX ORDER — HYDRALAZINE HYDROCHLORIDE 50 MG/1
50 TABLET, FILM COATED ORAL ONCE
Status: COMPLETED | OUTPATIENT
Start: 2017-09-14 | End: 2017-09-14

## 2017-09-14 RX ORDER — CLONIDINE HYDROCHLORIDE 0.1 MG/1
0.1 TABLET ORAL ONCE
Status: COMPLETED | OUTPATIENT
Start: 2017-09-15 | End: 2017-09-15

## 2017-09-14 RX ORDER — HYDRALAZINE HYDROCHLORIDE 20 MG/ML
10 INJECTION INTRAMUSCULAR; INTRAVENOUS ONCE
Status: DISCONTINUED | OUTPATIENT
Start: 2017-09-14 | End: 2017-09-14

## 2017-09-14 RX ORDER — LOSARTAN POTASSIUM 50 MG/1
100 TABLET ORAL DAILY
Status: DISCONTINUED | OUTPATIENT
Start: 2017-09-15 | End: 2017-09-18 | Stop reason: HOSPADM

## 2017-09-14 RX ORDER — CLONIDINE HYDROCHLORIDE 0.1 MG/1
0.1 TABLET ORAL ONCE
Status: COMPLETED | OUTPATIENT
Start: 2017-09-14 | End: 2017-09-14

## 2017-09-14 RX ADMIN — LOSARTAN POTASSIUM 50 MG: 50 TABLET, FILM COATED ORAL at 10:09

## 2017-09-14 RX ADMIN — CLONIDINE HYDROCHLORIDE 0.1 MG: 0.1 TABLET ORAL at 05:09

## 2017-09-14 RX ADMIN — CARVEDILOL 12.5 MG: 12.5 TABLET, FILM COATED ORAL at 10:09

## 2017-09-14 RX ADMIN — ASPIRIN 325 MG ORAL TABLET 325 MG: 325 PILL ORAL at 10:09

## 2017-09-14 RX ADMIN — ENOXAPARIN SODIUM 40 MG: 100 INJECTION SUBCUTANEOUS at 05:09

## 2017-09-14 RX ADMIN — HYDRALAZINE HYDROCHLORIDE 25 MG: 25 TABLET, FILM COATED ORAL at 07:09

## 2017-09-14 RX ADMIN — LEVOTHYROXINE SODIUM 50 MCG: 50 TABLET ORAL at 05:09

## 2017-09-14 RX ADMIN — HYDRALAZINE HYDROCHLORIDE 50 MG: 50 TABLET ORAL at 01:09

## 2017-09-14 RX ADMIN — POTASSIUM CHLORIDE 10 MEQ: 750 CAPSULE, EXTENDED RELEASE ORAL at 10:09

## 2017-09-14 RX ADMIN — HYDRALAZINE HYDROCHLORIDE 25 MG: 25 TABLET, FILM COATED ORAL at 08:09

## 2017-09-14 RX ADMIN — CARVEDILOL 12.5 MG: 12.5 TABLET, FILM COATED ORAL at 08:09

## 2017-09-14 RX ADMIN — HYDRALAZINE HYDROCHLORIDE 50 MG: 50 TABLET ORAL at 12:09

## 2017-09-14 NOTE — PLAN OF CARE
Problem: Fall Risk (Adult)  Intervention: Safety Promotion/Fall Prevention  Fall precautions maintained at times with pt. To prevent fall and injuries

## 2017-09-14 NOTE — PLAN OF CARE
Problem: Fall Risk (Adult)  Intervention: Review Medications/Identify Contributors to Fall Risk  Medication regimen education provided with pt. And her daughter

## 2017-09-14 NOTE — PT/OT/SLP PROGRESS
Physical Therapy  Treatment    Blue Cassidy   MRN: [id number]   Admitting Diagnosis: Acute encephalopathy    PT Received On: 09/14/17  PT Start Time: 0929     PT Stop Time: 1005    PT Total Time (min): 36 min    9:29AM- 9:55 AM was co-tx with OT due to pt with increase anxiety and did not want to to get up with only one therapist. 9:55AM- 10:55 AM was just PT treatment.    Billable Minutes:  Gait [id number] and Therapeutic Exercise 10    Treatment Type: Treatment  PT/PTA: PT     PTA Visit Number: 0       General Precautions: Standard, aspiration, fall  Orthopedic Precautions: N/A   Braces:      Do you have any cultural, spiritual, Amish conflicts, given your current situation?: none stated    Subjective:  Communicated with OT prior to session.      Pain/Comfort  Pain Rating 1: 4/10  Location - Side 1: Right  Location - Orientation 1: lateral  Location 1: ankle  Pain Addressed 1: Reposition, Distraction  Pain Rating Post-Intervention 1: 3/10    Objective:   Patient found with: bed alarm, holley catheter with OT present.    Functional Mobility:  Bed Mobility:   Supine to Sit: Minimum Assistance (with trunk and HOB elevated. ) Significant increase time for task with repeated cues.      Transfers:  Sit <> Stand Assistance: Maximum Assistance (from the bed and also from the bedside chair.  Performed x 2 trials. ) Cues for hand placement and sequencing.   Sit <> Stand Assistive Device: Rolling Walker    Gait:   Gait Distance: 18' x 1 and 22' x 1 with seated rest break b/t trials. Followed with chair for 2nd gait trial.  Assistance 1: Moderate assistance (for balance, tactile cues to facilitate upright posture and midline posture to R ant lean.)  Gait Assistive Device: Rolling walker  Gait Pattern: 3-point gait  Gait Deviation(s): decreased brii, increased time in double stance, decreased velocity of limb motion, decreased step length, decreased stride length, decreased weight-shifting ability, foot flat (antalgic  gait pattern R LE, pt instructed on utilizing RW to decrease WB thru R ankle due to soreness. )   Pt required increased time for task and repeated cues for sequencing and posture as above.  Noted slight R knee buckling during stance phase x several episodes during gait.    Balance:   Static Sit: GOOD: Takes MODERATE challenges from all directions  Dynamic Sit: GOOD-: Maintains balance through MODERATE excursions of active trunk movement,     Static Stand: POOR: Needs MODERATE assist to maintain  Dynamic stand: POOR: N/A     Therapeutic Activities and Exercises:  Therapeutic activities :PT ed pt on POC, safety awareness, posture, body mechanics and importance of not getting up alone.  She verbalized good understanding back to PT but will need reinforcement .  Pt was oriented to person and place (knew she was in a hospital but not which one or which city).   Pt was instructed on and performed seated  LE ex's to increase her strength/endurance and improve her balance:  Toe raises, Heel Raises, LAQ, hip abd/add x 20 reps each B LE's.  PT called CM, Padmaja Zuniga, to update on status and why pt has not been able to amb away from the bed yet due to cognitive and balance issues, left voicemail message as she did not answer.    White Board updated in pt's room and PT notified pt's nurse, Julia, on pt's level of (A) for transferring back to bed.    Gait training as above.     AM-PAC 6 CLICK MOBILITY  How much help from another person does this patient currently need?   1 = Unable, Total/Dependent Assistance  2 = A lot, Maximum/Moderate Assistance  3 = A little, Minimum/Contact Guard/Supervision  4 = None, Modified [place]/Independent    Turning over in bed (including adjusting bedclothes, sheets and blankets)?: 3  Sitting down on and standing up from a chair with arms (e.g., wheelchair, bedside commode, etc.): 2  Moving from lying on back to sitting on the side of the bed?: 3  Moving to and from a bed to a chair  (including a wheelchair)?: 2  Need to walk in hospital room?: 2  Climbing 3-5 steps with a railing?: 1  Total Score: 13    AM-PAC Raw Score CMS G-Code Modifier Level of Impairment Assistance   6 % Total / Unable   7 - 9 CM 80 - 100% Maximal Assist   10 - 14 CL 60 - 80% Moderate Assist   15 - 19 CK 40 - 60% Moderate Assist   20 - 22 CJ 20 - 40% Minimal Assist   23 CI 1-20% SBA / CGA   24 CH 0% Independent/ Mod I     Patient left up in chair with all lines intact, call button in reach, nsg, Julia,  notified and pt's dtr present.    Assessment:  Blue Cassidy is a 71 y.o. female with a medical diagnosis of Acute encephalopathy and presents with progress towards goals today with slight improvement with cognition with pt able to better follow commands, although she continues to need increase time, repeated cues and noted decreased attention to task.  Pt was able to increase her distance amb and danis increase activity today.  Pt will cont to benefit from PT to maximize her functional endurance and strength to improve her balance and safety with all tasks.  Pt is a high fall risk and will benefit from SNF for increase PT/OT to maximize functional recovery.  Pt participated well after her anxiety was eased with initial co-tx approach and then participated fully with independent session.  .    Rehab identified problem list/impairments: Rehab identified problem list/impairments: weakness, impaired endurance, impaired functional mobilty, gait instability, impaired balance, impaired cognition, decreased coordination, decreased safety awareness, pain    Rehab potential is good.    Activity tolerance: Good with rest breaks    Discharge recommendations: Discharge Facility/Level Of Care Needs: nursing facility, skilled     Barriers to discharge: Barriers to Discharge: Decreased caregiver support    Equipment recommendations: Equipment Needed After Discharge:  (will be determined at d/c from SNF.)     GOALS:    Physical  Therapy Goals        Problem: Physical Therapy Goal    Goal Priority Disciplines Outcome Goal Variances Interventions   Physical Therapy Goal     PT/OT, PT Ongoing (interventions implemented as appropriate)     Description:  Goals to be met by: 2017    Patient will increase functional independence with mobility by performin. Supine to sit with MInimal Assistance - Met with HOB elevated  2. Sit to supine with MInimal Assistance  3. Rolling to Left and Right with Minimal Assistance.- Met  4. Sit to stand transfer with Contact Guard Assistance - not met  5. Bed to chair transfer with Contact Guard Assistance using Rolling Walker - not met  6. Gait  x 25 feet with Contact Guard Assistance using Rolling Walker. -  Not met                       PLAN:    Patient to be seen 3 x/week  to address the above listed problems via gait training, therapeutic activities, therapeutic exercises, neuromuscular re-education  Plan of Care expires: 10/09/17  Plan of Care reviewed with: patient, daughter (pt's dtr arrived at end of session.)         Kenisha Lozada, PT  2017

## 2017-09-14 NOTE — PT/OT/SLP PROGRESS
"Occupational Therapy  Treatment    Blue Cassidy   MRN: [id number]   Admitting Diagnosis: Acute encephalopathy    OT Date of Treatment: 09/14/17   OT Start Time: 0920  OT Stop Time: 0955  OT Total Time (min): 35 min    Billable Minutes:  Therapeutic Activity 35    General Precautions: Standard, fall, aspiration  Orthopedic Precautions: N/A  Braces: N/A    Do you have any cultural, spiritual, Congregational conflicts, given your current situation?: none stated    Subjective:  Communicated with RN prior to session.  "You're going to need help if you' re going to walk me.  You cant do it on your own!"  Pain/Comfort  Pain Rating 1: 4/10 (complaints of pain in R ankle with pressure and to touch)  Location - Side 1: Right  Location - Orientation 1: lateral  Location 1: ankle  Pain Addressed 1: Distraction, Reposition  Pain Rating Post-Intervention 1: 3/10    Objective:  Patient found with: bed alarm, telemetry, holley catheter     Functional Mobility:  Bed Mobility:  Rolling/Turning to Left: With side rail, Stand by assistance (extended time and effort)  Scooting/Bridging: Minimum Assistance, With side rail (hand held assist for hip scooting to EOB)  Supine to Sit: Minimum Assistance, WIth side rail    Transfers:   Sit <> Stand Assistance: Moderate Assistance (x2 people for safety )  Sit <> Stand Assistive Device: Rolling Walker  Bed <> Chair Transfer Assistance: Contact Guard Assistance (verbal cues for hand placement and controled sit)  Bed <> Chair Assistive Device: Rolling Walker    Functional Ambulation: Patient with in room and hallway mobility witr rolling walker and Min A x2 with chair follow once upright.  Verbal cues fo rsafety and encouragement for continued participation.     Activities of Daily Living:     UE Dressing Level of Assistance: Minimum assistance (robe)        Balance:   Static Sit: FAIR+: Able to take MINIMAL challenges from all directions  Dynamic Sit: FAIR+: Maintains balance through MINIMAL " "excursions of active trunk motion  Static Stand: POOR+: Needs MINIMAL assist to maintain  Dynamic stand: FAIR: Needs CONTACT GUARD during gait    Therapeutic Activities and Exercises:  Patient educated on importance of daily participation in functional tasks and mobility for gains.  Bed mobility with extended time and effort and rails.  Sit at EOB with Min A and hand rails and verbal cues.  Sit to stand Moderate assist x2 and verbal cues for postural alignment and control.      AM-PAC 6 CLICK ADL   How much help from another person does this patient currently need?   1 = Unable, Total/Dependent Assistance  2 = A lot, Maximum/Moderate Assistance  3 = A little, Minimum/Contact Guard/Supervision  4 = None, Modified [place]/Independent    Putting on and taking off regular lower body clothing? : 2  Bathing (including washing, rinsing, drying)?: 2  Toileting, which includes using toilet, bedpan, or urinal? : 2  Putting on and taking off regular upper body clothing?: 3  Taking care of personal grooming such as brushing teeth?: 3  Eating meals?: 3  Total Score: 15     AM-PAC Raw Score CMS "G-Code Modifier Level of Impairment Assistance   6 % Total / Unable   7 - 8 CM 80 - 100% Maximal Assist   9-13 CL 60 - 80% Moderate Assist   14 - 19 CK 40 - 60% Moderate Assist   20 - 22 CJ 20 - 40% Minimal Assist   23 CI 1-20% SBA / CGA   24 CH 0% Independent/ Mod I       Patient left up in chair with all lines intact, call button in reach and PT present    ASSESSMENT:  Blue Cassidy is a 71 y.o. female with a medical diagnosis of Acute encephalopathy.  Patient presented in bed with no family present.  Patient expressed concern at attempts to provide mobility training with only one person as patient states she requires more than one person for mobility.  Patient with supine to sit edge of bed with verbal cues and extended time and effort and bed rails and min+ physical A for scooting and positioning.  Sit to stand with " moderate assist x2 with PT.  Patient able to tolerate short distance hallway mobility with min A for safety, balance and postural control.  Rest break after 3 minutes with no noted shortness of breath.  Followed with chair for safe return to room. Patient with complaints of pain in left ankle which seemed to lessen with movement.  Patient remained in chair for therex with PT and in no apparent distress.  Overall patient with improved participation but with continued cuing and repeated verbal cues for orientation and ensured safety.  Patient with balance correction during mobility for forward and left lean and postural alignment.  Patient would benefit from continued skilled OT for maximum safety.  SNF recommendation appropriate.  Not safe at home at this time.     Rehab identified problem list/impairments: Rehab identified problem list/impairments: weakness, impaired endurance, impaired self care skills, impaired functional mobilty, gait instability, impaired balance, decreased lower extremity function, decreased safety awareness    Rehab potential is good.    Activity tolerance: Good    Discharge recommendations: Discharge Facility/Level Of Care Needs: nursing facility, skilled     Barriers to discharge: Barriers to Discharge: Decreased caregiver support    Equipment recommendations:       GOALS:    Occupational Therapy Goals        Problem: Occupational Therapy Goal    Goal Priority Disciplines Outcome Interventions   Occupational Therapy Goal     OT, PT/OT Ongoing (interventions implemented as appropriate)    Description:  Goals to be met by: 9/25/17     Patient will increase functional independence with ADLs by performing:    Feeding with Set-up Assistance in upright position with little to no spilling.  UE Dressing with Set-up Assistance and Stand-by Assistance.  LE Dressing with Set-up Assistance and Moderate Assistance.  Grooming while standing with Contact Guard Assistance.  Toileting from bedside commode  with Moderate Assistance for hygiene and clothing management.   Supine to sit with Minimal Assistance. MET 9/12  Stand pivot transfers with Contact Guard Assistance.  Toilet transfer to bedside commode with Contact Guard Assistance.                       Plan:  Patient to be seen 3 x/week to address the above listed problems via self-care/home management, therapeutic activities, therapeutic exercises  Plan of Care expires: 10/09/17  Plan of Care reviewed with: patient         Elizabeth Gilliam, OT  09/14/2017

## 2017-09-14 NOTE — NURSING
Pt. B/p persistently high now 223/100 pt. Is asymptomatic no c/o discomfort, prn b/p ineffective, medicine team B paged by

## 2017-09-14 NOTE — PLAN OF CARE
Problem: Physical Therapy Goal  Goal: Physical Therapy Goal  Goals to be met by: 2017    Patient will increase functional independence with mobility by performin. Supine to sit with MInimal Assistance - Met with HOB elevated  2. Sit to supine with MInimal Assistance  3. Rolling to Left and Right with Minimal Assistance.- Met  4. Sit to stand transfer with Contact Guard Assistance - not met  5. Bed to chair transfer with Contact Guard Assistance using Rolling Walker - not met  6. Gait  x 25 feet with Contact Guard Assistance using Rolling Walker. -  Not met     Outcome: Ongoing (interventions implemented as appropriate)  Pt is progressing with PT with increased ability to follow commands today although she still exhibits decreased attention to task, required repeated commands/cues and increased time to follow all commands.  Pt will cont to benefit from PT to increase her functional endurance, improve her balance and safety to decrease her fall risk.   D/C Recs:  SNF  DME: to be determined on SNF.

## 2017-09-14 NOTE — SUBJECTIVE & OBJECTIVE
Interval History: woke up this am and talking, but still confused    Review of Systems   Constitutional: Negative for chills, diaphoresis, fatigue and fever.   HENT: Negative for congestion, sinus pain and trouble swallowing.    Respiratory: Negative for cough and shortness of breath.    Cardiovascular: Negative for chest pain and leg swelling.   Gastrointestinal: Negative for abdominal distention, abdominal pain, blood in stool, constipation, diarrhea and vomiting.   Genitourinary: Positive for difficulty urinating.   Musculoskeletal: Negative for neck pain and neck stiffness.   Skin: Negative for rash.   Neurological: Negative for tremors, weakness, numbness and headaches.   Psychiatric/Behavioral: Negative for agitation, behavioral problems and confusion.     Objective:     Vital Signs (Most Recent):  Temp: 97.7 °F (36.5 °C) (09/14/17 0753)  Pulse: (!) 58 (09/14/17 0753)  Resp: 18 (09/14/17 0753)  BP: (!) 170/0 (09/14/17 0808)  SpO2: 97 % (09/14/17 0753) Vital Signs (24h Range):  Temp:  [97.7 °F (36.5 °C)-99 °F (37.2 °C)] 97.7 °F (36.5 °C)  Pulse:  [58-62] 58  Resp:  [16-18] 18  SpO2:  [92 %-97 %] 97 %  BP: (121-223)/(0-100) 170/0     Weight: (!) 145.6 kg (321 lb)  Body mass index is 55.1 kg/m².    Intake/Output Summary (Last 24 hours) at 09/14/17 1015  Last data filed at 09/14/17 0817   Gross per 24 hour   Intake              120 ml   Output             2400 ml   Net            -2280 ml      Physical Exam   Constitutional: She is oriented to person, place, and time. She appears well-developed and well-nourished.   obese   HENT:   Head: Normocephalic and atraumatic.   Mouth/Throat: Oropharynx is clear and moist.   Eyes: Conjunctivae and EOM are normal. Pupils are equal, round, and reactive to light. No scleral icterus.   Neck: Normal range of motion. Neck supple. No thyromegaly present.   Cardiovascular: Normal rate, regular rhythm and normal heart sounds.    Pulmonary/Chest: Effort normal and breath sounds  normal. No respiratory distress. She has no wheezes. She has no rales.   Abdominal: Soft. Bowel sounds are normal. She exhibits no distension and no mass. There is no tenderness. There is no rebound and no guarding. No hernia.   Musculoskeletal: Normal range of motion. She exhibits edema. She exhibits no deformity.   Lymphadenopathy:     She has no cervical adenopathy.   Neurological: She is alert and oriented to person, place, and time. She has normal strength.   Psychiatric: She has a normal mood and affect. Her behavior is normal. Her speech is delayed. Cognition and memory are impaired. She exhibits abnormal recent memory and abnormal remote memory.   Sundowning at night She is attentive.       Significant Labs:   CBC:     Recent Labs  Lab 09/13/17  0356 09/14/17  0540   WBC 6.61 5.63   HGB 12.0 12.1   HCT 37.3 38.3    195     CMP:     Recent Labs  Lab 09/13/17  0356 09/14/17  0540    141   K 3.8 3.5    99   CO2 31* 31*   * 128*   BUN 13 13   CREATININE 1.3 1.2   CALCIUM 8.9 9.1   ANIONGAP 11 11   EGFRNONAA 40.8* 44.9*

## 2017-09-14 NOTE — PLAN OF CARE
Problem: Occupational Therapy Goal  Goal: Occupational Therapy Goal  Goals to be met by: 9/25/17     Patient will increase functional independence with ADLs by performing:    Feeding with Set-up Assistance in upright position with little to no spilling.  UE Dressing with Set-up Assistance and Stand-by Assistance.  LE Dressing with Set-up Assistance and Moderate Assistance.  Grooming while standing with Contact Guard Assistance.  Toileting from bedside commode with Moderate Assistance for hygiene and clothing management.   Supine to sit with Minimal Assistance. MET 9/12  Stand pivot transfers with Contact Guard Assistance.  Toilet transfer to bedside commode with Contact Guard Assistance.      Outcome: Ongoing (interventions implemented as appropriate)  Goals unmet and appropriate.  Cont with POC  Elizabeth Gilliam, OT  9/14/2017

## 2017-09-14 NOTE — PLAN OF CARE
Discharge planning: Message sent to Lyla at [facility] to inform that patient is medically ready for discharge.

## 2017-09-14 NOTE — PROGRESS NOTES
"[facility] Hospital Medicine  Progress Note    Patient Name: Blue Cassidy  MRN: [id number]  Patient Class: IP- Inpatient   Admission Date: 9/6/2017  Length of Stay: 8 days  Attending Physician: Torrey Maya MD  Primary Care Provider: Primary Doctor [facility] Medicine Team: [facility] Torrey Maya MD    Subjective:     Principal Problem:Acute encephalopathy    HPI:  Ms. Cassidy is a 72yo lady with a past medical history of   She now comes as a transfer from [facility] after being admitted there on 9/4/17 with acute altered mental status with agitation.  She had a CT head done there at admission which revealed, "right parietal lobe ischemia."  She was also found to have a, "slightly abnormal UA" and was started on Rocephin 1g iv q24 hours.  She was admitted to the IM service and Neurology was consulted, who recommended an MRI brain.  This was done, but was of such a poor quality that is was un-interpretable.  The night after she was admitted she, "had two focal seizures.  The patient was placed on IV Keppra and an EEG was obtained this morning."  She also had to be given 2mg of iv haldol to control some combativeness.  Since receiving all of these therapies, she has been lethargic and unable to take anything by mouth.       Hospital Course:  9/7 - pt more alert, still not back to baseline mental status accord to family,  Neurology saw pt this am,  2 am, labs collected,  EEG ordered  9/8 - spoke w anesthesia fellow yesterday,  Place another consutlt for anesthesia today for LP and MRI under sedation,  Spoke to neurology fellow and he is going to set up procedures.  9/9- up in chair, verbal and interacting with caretakers, eating a ookie by herself.  LP + protein, 270, MRI - consistent w chronic microvasc disease, and old small stroke w encephalomalasia right fromtal lobe and cerebro volume loss.   K 3.2  9/10 - developed acute urinary retention, holley " placed, discussed care of kishan Hall and Malgorzata, her family present.  Work excuse given to Malgorzata Whalen.  Pt still confused, still waitng on HSV and other lab. See palliative care note below  9/11 - family at bedside reports pt did get agitated but they were able to hansal it.  Will add seroquel prn    Interval History: woke up this am and talking, but still confused    Review of Systems   Constitutional: Negative for chills, diaphoresis, fatigue and fever.   HENT: Negative for congestion, sinus pain and trouble swallowing.    Respiratory: Negative for cough and shortness of breath.    Cardiovascular: Negative for chest pain and leg swelling.   Gastrointestinal: Negative for abdominal distention, abdominal pain, blood in stool, constipation, diarrhea and vomiting.   Genitourinary: Positive for difficulty urinating.   Musculoskeletal: Negative for neck pain and neck stiffness.   Skin: Negative for rash.   Neurological: Negative for tremors, weakness, numbness and headaches.   Psychiatric/Behavioral: Negative for agitation, behavioral problems and confusion.     Objective:     Vital Signs (Most Recent):  Temp: 97.7 °F (36.5 °C) (09/14/17 0753)  Pulse: (!) 58 (09/14/17 0753)  Resp: 18 (09/14/17 0753)  BP: (!) 170/0 (09/14/17 0808)  SpO2: 97 % (09/14/17 0753) Vital Signs (24h Range):  Temp:  [97.7 °F (36.5 °C)-99 °F (37.2 °C)] 97.7 °F (36.5 °C)  Pulse:  [58-62] 58  Resp:  [16-18] 18  SpO2:  [92 %-97 %] 97 %  BP: (121-223)/(0-100) 170/0     Weight: (!) 145.6 kg (321 lb)  Body mass index is 55.1 kg/m².    Intake/Output Summary (Last 24 hours) at 09/14/17 1015  Last data filed at 09/14/17 0817   Gross per 24 hour   Intake              120 ml   Output             2400 ml   Net            -2280 ml      Physical Exam   Constitutional: She is oriented to person, place, and time. She appears well-developed and well-nourished.   obese   HENT:   Head: Normocephalic and atraumatic.   Mouth/Throat: Oropharynx is clear and  moist.   Eyes: Conjunctivae and EOM are normal. Pupils are equal, round, and reactive to light. No scleral icterus.   Neck: Normal range of motion. Neck supple. No thyromegaly present.   Cardiovascular: Normal rate, regular rhythm and normal heart sounds.    Pulmonary/Chest: Effort normal and breath sounds normal. No respiratory distress. She has no wheezes. She has no rales.   Abdominal: Soft. Bowel sounds are normal. She exhibits no distension and no mass. There is no tenderness. There is no rebound and no guarding. No hernia.   Musculoskeletal: Normal range of motion. She exhibits edema. She exhibits no deformity.   Lymphadenopathy:     She has no cervical adenopathy.   Neurological: She is alert and oriented to person, place, and time. She has normal strength.   Psychiatric: She has a normal mood and affect. Her behavior is normal. Her speech is delayed. Cognition and memory are impaired. She exhibits abnormal recent memory and abnormal remote memory.   Sundowning at night She is attentive.       Significant Labs:   CBC:     Recent Labs  Lab 09/13/17  0356 09/14/17  0540   WBC 6.61 5.63   HGB 12.0 12.1   HCT 37.3 38.3    195     CMP:     Recent Labs  Lab 09/13/17  0356 09/14/17  0540    141   K 3.8 3.5    99   CO2 31* 31*   * 128*   BUN 13 13   CREATININE 1.3 1.2   CALCIUM 8.9 9.1   ANIONGAP 11 11   EGFRNONAA 40.8* 44.9*           Assessment/Plan:      * Acute encephalopathy    9/11 - waiting on HSV,if positive will need 8 weeks of IV acyclovir,   will transfer to rehab when accepted,  F/u neurology for rest of results and viral studies. Add prn seoquel q HS for agitation.   9/10 -  Discussed nature of brain disorder w Malgorzata and Isabel, who are asking questions.  Pt has acute encephalitis, which may improve,has evidence of mild dementia and evidence of old stroke per MRI, and active delerium presently, which may also improve.  Discussed cardiac effects of anti-psychotic agents and  would use them only if behavior can't be controlled.  They intend on taking her home. MS likely to improve in a stable environment.   24 hour EEG ordered.  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 Seizure upon admit  - Paraneoplastic process   - Other infection (UTI)  - on empiric acyclovir     On IVFs for hydration, npo     - Fall precaution   - Check B12, MMA, NH3, B1, B6, HIV, RPR, ESR, CRP, TPO, ROXANN  - collected  - Urine drug screen - ordered  - EEG  - ordered  - Autoimmune encephalitis panel in serum - ordered  - MRI brain with and without contrast   - LP w/ CSF analysis:   · Cell count/dif, protein, glucose, ACE    · Infectious panel: arbovirus, CMV, HBV, EBV, HSV, HHV-6, HIV, AMMON Virus(PML), [place] equine, Varicella zoster, West Nile, VDRL CSF, crypto, CJD 14-3-3    · Culture: bacterial/fungal cx  · Paraneoplastic panel   ·   Patient need to be sedated for MRI and LP.   Would need anaesthesia help considering patient need sedation and difficult LP due to body habitus     Abnormal CT head with chronic microvascular ischemic changes  -1.2 cm area of hypodensity in the right parietal lobe without obvious cytotoxic edema, given irregular margins and confinement to white matter.    -Findings most likely represent components of chronic small vessel ischemic disease.  -Nonspecific hypodensity in the right cerebellar hemisphere.  MRI of the brain without and with contrast obtained for further evaluation        Palliative care encounter    Non-hospice Palliative Care:   Does patient have Capacity? no  Goals- improvement of MS w treatment of encephalitis  Code Status- FULL, we discussed code status and  family contemplating change  Pain management-  stable  Prognosis-  good  Family discussions-9/10 - Isabel and Malgorzata asked me about hospice.  They want it in order to get DME at home.  Pt's  did well on hospice and did not want heroic measures.  They think she has the same wishes but want to discuss with other family members.  Functional Status - up in chair w assistance, some combativeness last night, chronic indwelling holley or I&O caths needed.    Post acute care plan:  home w HH  And DME               Acute urinary retention    Holley placed  placement may improve delirium  Needs to keep it for home  Discussed management of holley w several family members - can do a voiding trial with monthly holley changes.            Hypokalemia    9/8 - kadur 40 x 2        CHF (congestive heart failure)    stable          HTN (hypertension), malignant    -Start iv lopressor 5mg iv q6 hours  -Telemetry  -2D Echo with cfd    9/7 - 153/81  9/10 - stable, 158/ 70, resume coreg and cozzar  9/11 - 145/98 : increased cozaar to 50 daily        Type 2 diabetes mellitus with complication    Recent Labs      09/10/17   0801  09/10/17   1225  09/10/17   1653  09/10/17   2101  09/11/17   0730  09/11/17   1139   POCTGLUCOSE  139*  164*  147*  160*  103  190*     On SS          Abnormal CT of the head    9/6/17:  1.2 cm area of hypodensity in the right parietal lobe without obvious cytotoxic edema, given irregular margins and confinement to white matter.  Findings most likely represent components of chronic small vessel ischemic disease.  Nonspecific hypodensity in the right cerebellar hemisphere.  MRI of the brain without and with contrast obtained for further evaluation.    9/8/17 MRI - consistent w chronic microvasc disease, and old small stroke w encephalomalasia right fromtal lobe and cerebro volume loss.          Seizure    9/11 - stable in hospital, MS somewhat improved  New onset  keppra   eeg  Neurology consulted          Urinary tract infection without hematuria     Rocephin started  9/9 - day 3, no cultures here, UA was abnormal from outside hospital.  Will dc rocephin and reculture the urine, repeat UA.  No evidece of bacterial meinigitis  Needs to keep foely for chronic urinary retention.  Family can't preform I&O caths given her MS.  Voiding trial monthly w holley changes.          Delirium    Suspect seizure with viral encephalitis  keppra started              VTE Risk Mitigation         Ordered     enoxaparin injection 40 mg  Daily     Route:  Subcutaneous        09/06/17 1954     Medium Risk of VTE  Once      09/06/17 1954     Place sequential compression device  Until discontinued      09/06/17 1954     Place ROSITA hose  Until discontinued      09/06/17 1954              Torrey Maya MD  Department of Hospital Medicine   [facility]

## 2017-09-14 NOTE — ASSESSMENT & PLAN NOTE
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 Add prn seoquel q HS for agitation.   9/10 -  Discussed nature of brain disorder w Malgorzata and Isabel, who are asking questions.  Pt has acute encephalitis, which may improve,has evidence of mild dementia and evidence of old stroke per MRI, and active delerium presently, which may also improve.  Discussed cardiac effects of anti-psychotic agents and would use them only if behavior can't be controlled.  They intend on taking her home. MS likely to improve in a stable environment.   24 hour EEG ordered.  9/9 - LP results pending, + protein suggest possible viral etiology  9/8- atempting to set up LP and MRI under sedation with anesthesia    LP is WNL, HSV is neg, will stop acyclovir

## 2017-09-14 NOTE — ASSESSMENT & PLAN NOTE
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 Add prn seoquel q HS for agitation.   9/10 -  Discussed nature of brain disorder w Malgorzata and Isabel, who are asking questions.  Pt has acute encephalitis, which may improve,has evidence of mild dementia and evidence of old stroke per MRI, and active delerium presently, which may also improve.  Discussed cardiac effects of anti-psychotic agents and would use them only if behavior can't be controlled.  They intend on taking her home. MS likely to improve in a stable environment.   24 hour EEG ordered.  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 Seizure upon admit  - Paraneoplastic process   - Other infection (UTI)  - on empiric acyclovir     On IVFs for hydration, npo     - Fall precaution   - Check B12, MMA, NH3, B1, B6, HIV, RPR, ESR, CRP, TPO, ROXANN  - collected  - Urine drug screen - ordered  - EEG  - ordered  - Autoimmune encephalitis panel in serum - ordered  - MRI brain with and without contrast   - LP w/ CSF analysis:   · Cell count/dif, protein, glucose, ACE    · Infectious panel: arbovirus, CMV, HBV, EBV, HSV, HHV-6, HIV, AMMON Virus(PML), [place] equine, Varicella zoster, West Nile, VDRL CSF, crypto, CJD 14-3-3    · Culture: bacterial/fungal cx  · Paraneoplastic panel   ·   Patient need to be sedated for MRI and LP.   Would need anaesthesia help considering patient need sedation and difficult LP due to body habitus     Abnormal CT head with chronic microvascular ischemic changes  -1.2 cm area of hypodensity in the right parietal lobe without obvious cytotoxic edema, given irregular  margins and confinement to white matter.    -Findings most likely represent components of chronic small vessel ischemic disease.  -Nonspecific hypodensity in the right cerebellar hemisphere.  MRI of the brain without and with contrast obtained for further evaluation

## 2017-09-14 NOTE — PLAN OF CARE
Problem: Patient Care Overview  Goal: Plan of Care Review  Outcome: Ongoing (interventions implemented as appropriate)  Patient remained up in bedside chair most of evening shift, AAO4, daughter voices patient has some memory loss at times, continue accuchecks with 1700 = 170, tolerating diet, holley cath maintained draining cloudy urine, seizure precautions continued.

## 2017-09-14 NOTE — NURSING
Medicine team B called again regarding pt.'s elevated b/p currently 216/93 , states they will order clonidine to be given pt. Is resting quietly no c/o any discomforts, headaches, blurred vision, etc.... Will continue to monitor

## 2017-09-15 LAB
ACHR BIND AB SER-SCNC: 0 NMOL/L
AMPA-R AB CBA, SERUM: NEGATIVE
AMPHIPHYSIN AB TITR SER: NEGATIVE TITER
ANION GAP SERPL CALC-SCNC: 8 MMOL/L
BASOPHILS # BLD AUTO: 0.03 K/UL
BASOPHILS NFR BLD: 0.6 %
BUN SERPL-MCNC: 13 MG/DL
CALCIUM SERPL-MCNC: 9.5 MG/DL
CASPR2-IGG CBA: NEGATIVE
CHLORIDE SERPL-SCNC: 100 MMOL/L
CO2 SERPL-SCNC: 33 MMOL/L
CREAT SERPL-MCNC: 1.1 MG/DL
CV2 IGG TITR SER: NEGATIVE TITER
CYTOMEGALOVIRUS SOURCE: NORMAL
DIFFERENTIAL METHOD: ABNORMAL
EOSINOPHIL # BLD AUTO: 0.1 K/UL
EOSINOPHIL NFR BLD: 2.3 %
ERYTHROCYTE [DISTWIDTH] IN BLOOD BY AUTOMATED COUNT: 13.9 %
EST. GFR  (AFRICAN AMERICAN): 57.6 ML/MIN/1.73 M^2
EST. GFR  (NON AFRICAN AMERICAN): 49.9 ML/MIN/1.73 M^2
GABA-B-R AB CBA, SERUM: NEGATIVE
GAD65 AB SER-SCNC: 0 NMOL/L
GLIAL NUC TYPE 1 AB TITR SER: NEGATIVE TITER
GLUCOSE SERPL-MCNC: 128 MG/DL
HCT VFR BLD AUTO: 38.5 %
HGB BLD-MCNC: 12.5 G/DL
HU1 AB TITR SER: NEGATIVE TITER
HU2 AB TITR SER IF: NEGATIVE TITER
HU3 AB TITR SER: NEGATIVE TITER
IMMUNOLOGIST REVIEW: NORMAL
LGI1-IGG CBA: NEGATIVE
LYMPHOCYTES # BLD AUTO: 1.8 K/UL
LYMPHOCYTES NFR BLD: 35.5 %
Lab: <2.4 LOG (10) COPIES/ML
Lab: <250 COPIES/ML
Lab: NOT DETECTED
MAGNESIUM SERPL-MCNC: 1.5 MG/DL
MCH RBC QN AUTO: 28.3 PG
MCHC RBC AUTO-ENTMCNC: 32.5 G/DL
MCV RBC AUTO: 87 FL
MONOCYTES # BLD AUTO: 0.9 K/UL
MONOCYTES NFR BLD: 16.6 %
NACHR AB SER-SCNC: 0 NMOL/L
NEUTROPHILS # BLD AUTO: 2.3 K/UL
NEUTROPHILS NFR BLD: 44.6 %
NMDA-R AB CBA, SERUM: NEGATIVE
PAVAL REFLEX TEST ADDED: NORMAL
PCA-1 AB TITR SER: NEGATIVE TITER
PCA-2 AB TITR SER: NEGATIVE TITER
PCA-TR AB TITR SER: NEGATIVE TITER
PHOSPHATE SERPL-MCNC: 2.9 MG/DL
PLATELET # BLD AUTO: 207 K/UL
PMV BLD AUTO: 10.6 FL
POCT GLUCOSE: 134 MG/DL (ref 70–110)
POCT GLUCOSE: 153 MG/DL (ref 70–110)
POCT GLUCOSE: 155 MG/DL (ref 70–110)
POCT GLUCOSE: 178 MG/DL (ref 70–110)
POCT GLUCOSE: 214 MG/DL (ref 70–110)
POTASSIUM SERPL-SCNC: 3.7 MMOL/L
RBC # BLD AUTO: 4.41 M/UL
SODIUM SERPL-SCNC: 141 MMOL/L
VGCC-N BIND AB SER-SCNC: 0 NMOL/L
VGCC-P/Q BIND AB SER-SCNC: 0 NMOL/L
VGKC AB SER-SCNC: 0 NMOL/L
WBC # BLD AUTO: 5.18 K/UL

## 2017-09-15 PROCEDURE — 84100 ASSAY OF PHOSPHORUS: CPT

## 2017-09-15 PROCEDURE — 25000003 PHARM REV CODE 250: Performed by: HOSPITALIST

## 2017-09-15 PROCEDURE — 36415 COLL VENOUS BLD VENIPUNCTURE: CPT

## 2017-09-15 PROCEDURE — 25000003 PHARM REV CODE 250: Performed by: NURSE PRACTITIONER

## 2017-09-15 PROCEDURE — 99233 SBSQ HOSP IP/OBS HIGH 50: CPT | Mod: ,,, | Performed by: INTERNAL MEDICINE

## 2017-09-15 PROCEDURE — 11000001 HC ACUTE MED/SURG PRIVATE ROOM

## 2017-09-15 PROCEDURE — 25000003 PHARM REV CODE 250: Performed by: PHYSICIAN ASSISTANT

## 2017-09-15 PROCEDURE — 63600175 PHARM REV CODE 636 W HCPCS: Performed by: INTERNAL MEDICINE

## 2017-09-15 PROCEDURE — 83735 ASSAY OF MAGNESIUM: CPT

## 2017-09-15 PROCEDURE — 80048 BASIC METABOLIC PNL TOTAL CA: CPT

## 2017-09-15 PROCEDURE — 25000003 PHARM REV CODE 250: Performed by: INTERNAL MEDICINE

## 2017-09-15 PROCEDURE — 85025 COMPLETE CBC W/AUTO DIFF WBC: CPT

## 2017-09-15 RX ORDER — CLONIDINE HYDROCHLORIDE 0.1 MG/1
0.1 TABLET ORAL ONCE
Status: COMPLETED | OUTPATIENT
Start: 2017-09-15 | End: 2017-09-15

## 2017-09-15 RX ORDER — BISACODYL 5 MG
5 TABLET, DELAYED RELEASE (ENTERIC COATED) ORAL DAILY PRN
Status: DISCONTINUED | OUTPATIENT
Start: 2017-09-15 | End: 2017-09-18 | Stop reason: HOSPADM

## 2017-09-15 RX ORDER — HYDRALAZINE HYDROCHLORIDE 50 MG/1
50 TABLET, FILM COATED ORAL ONCE
Status: COMPLETED | OUTPATIENT
Start: 2017-09-15 | End: 2017-09-15

## 2017-09-15 RX ORDER — HYDRALAZINE HYDROCHLORIDE 25 MG/1
25 TABLET, FILM COATED ORAL EVERY 8 HOURS
Status: DISCONTINUED | OUTPATIENT
Start: 2017-09-15 | End: 2017-09-16

## 2017-09-15 RX ORDER — CARVEDILOL 25 MG/1
25 TABLET ORAL 2 TIMES DAILY
Status: DISCONTINUED | OUTPATIENT
Start: 2017-09-15 | End: 2017-09-18 | Stop reason: HOSPADM

## 2017-09-15 RX ADMIN — ENOXAPARIN SODIUM 40 MG: 100 INJECTION SUBCUTANEOUS at 05:09

## 2017-09-15 RX ADMIN — CARVEDILOL 25 MG: 25 TABLET, FILM COATED ORAL at 08:09

## 2017-09-15 RX ADMIN — LOSARTAN POTASSIUM 100 MG: 50 TABLET, FILM COATED ORAL at 09:09

## 2017-09-15 RX ADMIN — ASPIRIN 325 MG ORAL TABLET 325 MG: 325 PILL ORAL at 09:09

## 2017-09-15 RX ADMIN — POTASSIUM CHLORIDE 10 MEQ: 750 CAPSULE, EXTENDED RELEASE ORAL at 09:09

## 2017-09-15 RX ADMIN — HYDRALAZINE HYDROCHLORIDE 50 MG: 50 TABLET ORAL at 02:09

## 2017-09-15 RX ADMIN — BISACODYL 5 MG: 5 TABLET, COATED ORAL at 01:09

## 2017-09-15 RX ADMIN — CLONIDINE HYDROCHLORIDE 0.1 MG: 0.1 TABLET ORAL at 06:09

## 2017-09-15 RX ADMIN — HYDRALAZINE HYDROCHLORIDE 25 MG: 25 TABLET, FILM COATED ORAL at 08:09

## 2017-09-15 RX ADMIN — CLONIDINE HYDROCHLORIDE 0.1 MG: 0.1 TABLET ORAL at 12:09

## 2017-09-15 RX ADMIN — CARVEDILOL 12.5 MG: 12.5 TABLET, FILM COATED ORAL at 09:09

## 2017-09-15 RX ADMIN — LEVOTHYROXINE SODIUM 50 MCG: 50 TABLET ORAL at 06:09

## 2017-09-15 RX ADMIN — HYDRALAZINE HYDROCHLORIDE 25 MG: 25 TABLET, FILM COATED ORAL at 11:09

## 2017-09-15 NOTE — NURSING
Team B recall regarding elevated b/p , b/p this elevated after giving hydralzine 50mg  Paging team  waiting for call back... Pt. Asymptomatic no c/o discomforts..

## 2017-09-15 NOTE — PROGRESS NOTES
"[facility] Hospital Medicine  Progress Note    Patient Name: Blue Cassidy  MRN: [id number]  Patient Class: IP- Inpatient   Admission Date: 9/6/2017  Length of Stay: 9 days  Attending Physician: Torrey Maya MD  Primary Care Provider: Primary Doctor [facility] Medicine Team: [facility] MED B Torrey Maya MD    Subjective:     Principal Problem:Acute encephalopathy    HPI:  Ms. Cassidy is a 72yo lady with a past medical history of   She now comes as a transfer from [facility] after being admitted there on 9/4/17 with acute altered mental status with agitation.  She had a CT head done there at admission which revealed, "right parietal lobe ischemia."  She was also found to have a, "slightly abnormal UA" and was started on Rocephin 1g iv q24 hours.  She was admitted to the IM service and Neurology was consulted, who recommended an MRI brain.  This was done, but was of such a poor quality that is was un-interpretable.  The night after she was admitted she, "had two focal seizures.  The patient was placed on IV Keppra and an EEG was obtained this morning."  She also had to be given 2mg of iv haldol to control some combativeness.  Since receiving all of these therapies, she has been lethargic and unable to take anything by mouth.       Hospital Course:  9/7 - pt more alert, still not back to baseline mental status accord to family,  Neurology saw pt this am,  2 am, labs collected,  EEG ordered  9/8 - spoke w anesthesia fellow yesterday,  Place another consutlt for anesthesia today for LP and MRI under sedation,  Spoke to neurology fellow and he is going to set up procedures.  9/9- up in chair, verbal and interacting with caretakers, eating a ookie by herself.  LP + protein, 270, MRI - consistent w chronic microvasc disease, and old small stroke w encephalomalasia right fromtal lobe and cerebro volume loss.   K 3.2  9/10 - developed acute urinary retention, holley " placed, discussed care of kishan Hall and Malgorzata, her family present.  Work excuse given to Malgorzata Whalen.  Pt still confused, still waitng on HSV and other lab. See palliative care note below  9/11 - family at bedside reports pt did get agitated but they were able to hansal it.  Will add seroquel prn    Interval History: woke up this am and talking, but still confused    Review of Systems   Constitutional: Negative for chills, diaphoresis, fatigue and fever.   HENT: Negative for congestion, sinus pain and trouble swallowing.    Respiratory: Negative for cough and shortness of breath.    Cardiovascular: Negative for chest pain and leg swelling.   Gastrointestinal: Negative for abdominal distention, abdominal pain, blood in stool, constipation, diarrhea and vomiting.   Genitourinary: Positive for difficulty urinating.   Musculoskeletal: Negative for neck pain and neck stiffness.   Skin: Negative for rash.   Neurological: Negative for tremors, weakness, numbness and headaches.   Psychiatric/Behavioral: Negative for agitation, behavioral problems and confusion.     Objective:     Vital Signs (Most Recent):  Temp: 98.1 °F (36.7 °C) (09/15/17 0308)  Pulse: (!) 59 (09/15/17 0910)  Resp: 18 (09/15/17 0308)  BP: (!) 172/79 (09/15/17 0910)  SpO2: (!) 94 % (09/15/17 0800) Vital Signs (24h Range):  Temp:  [96 °F (35.6 °C)-98.1 °F (36.7 °C)] 98.1 °F (36.7 °C)  Pulse:  [56-95] 59  Resp:  [16-18] 18  SpO2:  [94 %-98 %] 94 %  BP: (140-205)/(65-89) 172/79     Weight: (!) 145.6 kg (321 lb)  Body mass index is 55.1 kg/m².    Intake/Output Summary (Last 24 hours) at 09/15/17 1058  Last data filed at 09/15/17 0900   Gross per 24 hour   Intake              480 ml   Output             1400 ml   Net             -920 ml      Physical Exam   Constitutional: She is oriented to person, place, and time. She appears well-developed and well-nourished.   obese   HENT:   Head: Normocephalic and atraumatic.   Mouth/Throat: Oropharynx is clear  and moist.   Eyes: Conjunctivae and EOM are normal. Pupils are equal, round, and reactive to light. No scleral icterus.   Neck: Normal range of motion. Neck supple. No thyromegaly present.   Cardiovascular: Normal rate, regular rhythm and normal heart sounds.    Pulmonary/Chest: Effort normal and breath sounds normal. No respiratory distress. She has no wheezes. She has no rales.   Abdominal: Soft. Bowel sounds are normal. She exhibits no distension and no mass. There is no tenderness. There is no rebound and no guarding. No hernia.   Musculoskeletal: Normal range of motion. She exhibits edema. She exhibits no deformity.   Lymphadenopathy:     She has no cervical adenopathy.   Neurological: She is alert and oriented to person, place, and time. She has normal strength.   Psychiatric: She has a normal mood and affect. Her behavior is normal. Her speech is delayed. Cognition and memory are impaired. She exhibits abnormal recent memory and abnormal remote memory.   Sundowning at night She is attentive.       Significant Labs:   CBC:     Recent Labs  Lab 09/14/17  0540 09/15/17  0426   WBC 5.63 5.18   HGB 12.1 12.5   HCT 38.3 38.5    207     CMP:     Recent Labs  Lab 09/14/17  0540 09/15/17  0426    141   K 3.5 3.7   CL 99 100   CO2 31* 33*   * 128*   BUN 13 13   CREATININE 1.2 1.1   CALCIUM 9.1 9.5   ANIONGAP 11 8   EGFRNONAA 44.9* 49.9*           Assessment/Plan:      * Acute encephalopathy    9/11 - waiting on HSV,if positive will need 8 weeks of IV acyclovir,   will transfer to rehab when accepted,  F/u neurology for rest of results and viral studies. Add prn seoquel q HS for agitation.   9/10 -  Discussed nature of brain disorder w Malgorzata and Isabel, who are asking questions.  Pt has acute encephalitis, which may improve,has evidence of mild dementia and evidence of old stroke per MRI, and active delerium presently, which may also improve.  Discussed cardiac effects of anti-psychotic agents and  would use them only if behavior can't be controlled.  They intend on taking her home. MS likely to improve in a stable environment.   24 hour EEG ordered.  9/9 - LP results pending, + protein suggest possible viral etiology  9/8- atempting to set up LP and MRI under sedation with anesthesia    LP is WNL, HSV is neg, will stop acyclovir        Palliative care encounter    Non-hospice Palliative Care:   Does patient have Capacity? no  Goals- improvement of MS w treatment of encephalitis  Code Status- FULL, we discussed code status and  family contemplating change  Pain management- stable  Prognosis-  good  Family discussions-9/10 - Isabel and Malgorzata asked me about hospice.  They want it in order to get DME at home.  Pt's  did well on hospice and did not want heroic measures.  They think she has the same wishes but want to discuss with other family members.  Functional Status - up in chair w assistance, some combativeness last night, chronic indwelling holley or I&O caths needed.    Post acute care plan:  home w HH  And DME               Acute urinary retention    Holley placed  placement may improve delirium  Needs to keep it for home  Discussed management of holley w several family members - can do a voiding trial with monthly holley changes.            Hypokalemia    9/8 - kadur 40 x 2        CHF (congestive heart failure)    stable          HTN (hypertension), malignant    -Start iv lopressor 5mg iv q6 hours  -Telemetry  -2D Echo with cfd    9/7 - 153/81  9/10 - stable, 158/ 70, resume coreg and cozzar  9/11 - 145/98 : increased cozaar to 50 daily        Type 2 diabetes mellitus with complication    Recent Labs      09/10/17   0801  09/10/17   1225  09/10/17   1653  09/10/17   2101  09/11/17   0730  09/11/17   1139   POCTGLUCOSE  139*  164*  147*  160*  103  190*     On SS          Abnormal CT of the head    9/6/17:  1.2 cm area of hypodensity in the right parietal lobe without obvious cytotoxic edema, given  irregular margins and confinement to white matter.  Findings most likely represent components of chronic small vessel ischemic disease.  Nonspecific hypodensity in the right cerebellar hemisphere.  MRI of the brain without and with contrast obtained for further evaluation.    9/8/17 MRI - consistent w chronic microvasc disease, and old small stroke w encephalomalasia right fromtal lobe and cerebro volume loss.          Seizure    9/11 - stable in hospital, MS somewhat improved  New onset  keppra   eeg  Neurology consulted          Urinary tract infection without hematuria    Rocephin started  9/9 - day 3, no cultures here, UA was abnormal from outside hospital.  Will dc rocephin and reculture the urine, repeat UA.  No evidece of bacterial meinigitis  Needs to keep foely for chronic urinary retention.  Family can't preform I&O caths given her MS.  Voiding trial monthly w holley changes.          Delirium    Suspect seizure with viral encephalitis  keppra started              VTE Risk Mitigation         Ordered     enoxaparin injection 40 mg  Daily     Route:  Subcutaneous        09/06/17 1954     Medium Risk of VTE  Once      09/06/17 1954     Place sequential compression device  Until discontinued      09/06/17 1954     Place ROSITA hose  Until discontinued      09/06/17 1954              Torrey Maya MD  Department of Hospital Medicine   [facility]

## 2017-09-15 NOTE — NURSING
Jovani from team B called and notified of sustained elevated b/p , re-ordered clonide 0.1mg , stated he will be consultting with the pt. Primary team

## 2017-09-15 NOTE — NURSING
Team B recalled regarding elevated b/p 199/89, after giving clonidine 0.1mg at 0008, not effective, ordered a now dose of hydralzine 50mg po , stated they will address pt. B/p issues with primary team members, pt. Still asymptomatic, resting quietly in bed

## 2017-09-15 NOTE — NURSING
Medicine team B called and notified of pt.'s elevated b/p 203/73 informed of pt.'s status , pt. Is asymptomatic no c/o pain or discomforts, team stated they would ordered another b/p med for pt.

## 2017-09-15 NOTE — PLAN OF CARE
Problem: Pressure Ulcer Risk (Jesus Scale) (Adult,Obstetrics,Pediatric)  Intervention: Prevent/Manage Excess Moisture  Protection of pt. Skin provide and skin care education give to pt. And family

## 2017-09-15 NOTE — PLAN OF CARE
Problem: Patient Care Overview  Goal: Plan of Care Review  Outcome: Ongoing (interventions implemented as appropriate)  Patient up in BSC, elevated BP today responding to hydralazine per orders, patient c/o constipation, given dulcolax per orders, continuing accu checks AC/HS, Jones catheter maintained, remain fall free and skin intact, waiting for SNF placement. Continue to monitor.

## 2017-09-15 NOTE — PLAN OF CARE
Discharge planning: Message received from Lyla at [facility] and patient can potentially discharge to snf tomorrow if bp is controlled.

## 2017-09-15 NOTE — SUBJECTIVE & OBJECTIVE
Interval History: woke up this am and talking, but still confused    Review of Systems   Constitutional: Negative for chills, diaphoresis, fatigue and fever.   HENT: Negative for congestion, sinus pain and trouble swallowing.    Respiratory: Negative for cough and shortness of breath.    Cardiovascular: Negative for chest pain and leg swelling.   Gastrointestinal: Negative for abdominal distention, abdominal pain, blood in stool, constipation, diarrhea and vomiting.   Genitourinary: Positive for difficulty urinating.   Musculoskeletal: Negative for neck pain and neck stiffness.   Skin: Negative for rash.   Neurological: Negative for tremors, weakness, numbness and headaches.   Psychiatric/Behavioral: Negative for agitation, behavioral problems and confusion.     Objective:     Vital Signs (Most Recent):  Temp: 98.1 °F (36.7 °C) (09/15/17 0308)  Pulse: (!) 59 (09/15/17 0910)  Resp: 18 (09/15/17 0308)  BP: (!) 172/79 (09/15/17 0910)  SpO2: (!) 94 % (09/15/17 0800) Vital Signs (24h Range):  Temp:  [96 °F (35.6 °C)-98.1 °F (36.7 °C)] 98.1 °F (36.7 °C)  Pulse:  [56-95] 59  Resp:  [16-18] 18  SpO2:  [94 %-98 %] 94 %  BP: (140-205)/(65-89) 172/79     Weight: (!) 145.6 kg (321 lb)  Body mass index is 55.1 kg/m².    Intake/Output Summary (Last 24 hours) at 09/15/17 1058  Last data filed at 09/15/17 0900   Gross per 24 hour   Intake              480 ml   Output             1400 ml   Net             -920 ml      Physical Exam   Constitutional: She is oriented to person, place, and time. She appears well-developed and well-nourished.   obese   HENT:   Head: Normocephalic and atraumatic.   Mouth/Throat: Oropharynx is clear and moist.   Eyes: Conjunctivae and EOM are normal. Pupils are equal, round, and reactive to light. No scleral icterus.   Neck: Normal range of motion. Neck supple. No thyromegaly present.   Cardiovascular: Normal rate, regular rhythm and normal heart sounds.    Pulmonary/Chest: Effort normal and breath sounds  normal. No respiratory distress. She has no wheezes. She has no rales.   Abdominal: Soft. Bowel sounds are normal. She exhibits no distension and no mass. There is no tenderness. There is no rebound and no guarding. No hernia.   Musculoskeletal: Normal range of motion. She exhibits edema. She exhibits no deformity.   Lymphadenopathy:     She has no cervical adenopathy.   Neurological: She is alert and oriented to person, place, and time. She has normal strength.   Psychiatric: She has a normal mood and affect. Her behavior is normal. Her speech is delayed. Cognition and memory are impaired. She exhibits abnormal recent memory and abnormal remote memory.   Sundowning at night She is attentive.       Significant Labs:   CBC:     Recent Labs  Lab 09/14/17  0540 09/15/17  0426   WBC 5.63 5.18   HGB 12.1 12.5   HCT 38.3 38.5    207     CMP:     Recent Labs  Lab 09/14/17  0540 09/15/17  0426    141   K 3.5 3.7   CL 99 100   CO2 31* 33*   * 128*   BUN 13 13   CREATININE 1.2 1.1   CALCIUM 9.1 9.5   ANIONGAP 11 8   EGFRNONAA 44.9* 49.9*

## 2017-09-16 LAB
ANION GAP SERPL CALC-SCNC: 8 MMOL/L
BASOPHILS # BLD AUTO: 0.01 K/UL
BASOPHILS NFR BLD: 0.2 %
BUN SERPL-MCNC: 13 MG/DL
CALCIUM SERPL-MCNC: 9.1 MG/DL
CHLORIDE SERPL-SCNC: 101 MMOL/L
CO2 SERPL-SCNC: 33 MMOL/L
CREAT SERPL-MCNC: 1 MG/DL
DIFFERENTIAL METHOD: ABNORMAL
EOSINOPHIL # BLD AUTO: 0.1 K/UL
EOSINOPHIL NFR BLD: 2.4 %
ERYTHROCYTE [DISTWIDTH] IN BLOOD BY AUTOMATED COUNT: 14.1 %
EST. GFR  (AFRICAN AMERICAN): >60 ML/MIN/1.73 M^2
EST. GFR  (NON AFRICAN AMERICAN): 56 ML/MIN/1.73 M^2
GLUCOSE SERPL-MCNC: 139 MG/DL
HCT VFR BLD AUTO: 38 %
HGB BLD-MCNC: 12 G/DL
LYMPHOCYTES # BLD AUTO: 1.5 K/UL
LYMPHOCYTES NFR BLD: 35.4 %
MAGNESIUM SERPL-MCNC: 1.6 MG/DL
MCH RBC QN AUTO: 28.2 PG
MCHC RBC AUTO-ENTMCNC: 31.6 G/DL
MCV RBC AUTO: 89 FL
MONOCYTES # BLD AUTO: 0.7 K/UL
MONOCYTES NFR BLD: 16.4 %
NEUTROPHILS # BLD AUTO: 1.9 K/UL
NEUTROPHILS NFR BLD: 45.6 %
PHOSPHATE SERPL-MCNC: 3.3 MG/DL
PLATELET # BLD AUTO: 219 K/UL
PMV BLD AUTO: 10.3 FL
POCT GLUCOSE: 112 MG/DL (ref 70–110)
POCT GLUCOSE: 116 MG/DL (ref 70–110)
POCT GLUCOSE: 142 MG/DL (ref 70–110)
POCT GLUCOSE: 167 MG/DL (ref 70–110)
POCT GLUCOSE: 206 MG/DL (ref 70–110)
POTASSIUM SERPL-SCNC: 3.7 MMOL/L
RBC # BLD AUTO: 4.25 M/UL
SODIUM SERPL-SCNC: 142 MMOL/L
WBC # BLD AUTO: 4.15 K/UL

## 2017-09-16 PROCEDURE — 25000003 PHARM REV CODE 250: Performed by: NURSE PRACTITIONER

## 2017-09-16 PROCEDURE — 84100 ASSAY OF PHOSPHORUS: CPT

## 2017-09-16 PROCEDURE — 85025 COMPLETE CBC W/AUTO DIFF WBC: CPT

## 2017-09-16 PROCEDURE — 63600175 PHARM REV CODE 636 W HCPCS: Performed by: INTERNAL MEDICINE

## 2017-09-16 PROCEDURE — 25000003 PHARM REV CODE 250: Performed by: HOSPITALIST

## 2017-09-16 PROCEDURE — 25000003 PHARM REV CODE 250: Performed by: PHYSICIAN ASSISTANT

## 2017-09-16 PROCEDURE — 99233 SBSQ HOSP IP/OBS HIGH 50: CPT | Mod: ,,, | Performed by: INTERNAL MEDICINE

## 2017-09-16 PROCEDURE — 36415 COLL VENOUS BLD VENIPUNCTURE: CPT

## 2017-09-16 PROCEDURE — 11000001 HC ACUTE MED/SURG PRIVATE ROOM

## 2017-09-16 PROCEDURE — 80048 BASIC METABOLIC PNL TOTAL CA: CPT

## 2017-09-16 PROCEDURE — 83735 ASSAY OF MAGNESIUM: CPT

## 2017-09-16 PROCEDURE — 25000003 PHARM REV CODE 250: Performed by: INTERNAL MEDICINE

## 2017-09-16 RX ORDER — HYDRALAZINE HYDROCHLORIDE 50 MG/1
50 TABLET, FILM COATED ORAL EVERY 8 HOURS
Status: DISCONTINUED | OUTPATIENT
Start: 2017-09-16 | End: 2017-09-18 | Stop reason: HOSPADM

## 2017-09-16 RX ORDER — HYDRALAZINE HYDROCHLORIDE 25 MG/1
25 TABLET, FILM COATED ORAL EVERY 6 HOURS PRN
Status: DISCONTINUED | OUTPATIENT
Start: 2017-09-16 | End: 2017-09-18 | Stop reason: HOSPADM

## 2017-09-16 RX ORDER — HYDRALAZINE HYDROCHLORIDE 50 MG/1
50 TABLET, FILM COATED ORAL ONCE
Status: COMPLETED | OUTPATIENT
Start: 2017-09-16 | End: 2017-09-16

## 2017-09-16 RX ADMIN — HYDRALAZINE HYDROCHLORIDE 50 MG: 50 TABLET ORAL at 03:09

## 2017-09-16 RX ADMIN — CARVEDILOL 25 MG: 25 TABLET, FILM COATED ORAL at 09:09

## 2017-09-16 RX ADMIN — LOSARTAN POTASSIUM 100 MG: 50 TABLET, FILM COATED ORAL at 09:09

## 2017-09-16 RX ADMIN — HYDRALAZINE HYDROCHLORIDE 25 MG: 25 TABLET, FILM COATED ORAL at 12:09

## 2017-09-16 RX ADMIN — INSULIN ASPART 2 UNITS: 100 INJECTION, SOLUTION INTRAVENOUS; SUBCUTANEOUS at 05:09

## 2017-09-16 RX ADMIN — ASPIRIN 325 MG ORAL TABLET 325 MG: 325 PILL ORAL at 09:09

## 2017-09-16 RX ADMIN — ENOXAPARIN SODIUM 40 MG: 100 INJECTION SUBCUTANEOUS at 05:09

## 2017-09-16 RX ADMIN — LEVOTHYROXINE SODIUM 50 MCG: 50 TABLET ORAL at 06:09

## 2017-09-16 RX ADMIN — HYDRALAZINE HYDROCHLORIDE 50 MG: 50 TABLET ORAL at 02:09

## 2017-09-16 RX ADMIN — HYDRALAZINE HYDROCHLORIDE 50 MG: 50 TABLET ORAL at 09:09

## 2017-09-16 RX ADMIN — POTASSIUM CHLORIDE 10 MEQ: 750 CAPSULE, EXTENDED RELEASE ORAL at 09:09

## 2017-09-16 RX ADMIN — BISACODYL 5 MG: 5 TABLET, COATED ORAL at 10:09

## 2017-09-16 RX ADMIN — HYDRALAZINE HYDROCHLORIDE 50 MG: 50 TABLET ORAL at 06:09

## 2017-09-16 NOTE — PLAN OF CARE
Problem: Patient Care Overview  Goal: Plan of Care Review  Outcome: Ongoing (interventions implemented as appropriate)  Patient is AAOx4. No falls throughout shift. Patient is up with assistance. Patient had bowel movement today. Blood glucose monitored and insulin administered.

## 2017-09-16 NOTE — SUBJECTIVE & OBJECTIVE
Interval History: woke up this am and talking, but still confused    Review of Systems   Constitutional: Negative for chills, diaphoresis, fatigue and fever.   HENT: Negative for congestion, sinus pain and trouble swallowing.    Respiratory: Negative for cough and shortness of breath.    Cardiovascular: Negative for chest pain and leg swelling.   Gastrointestinal: Negative for abdominal distention, abdominal pain, blood in stool, constipation, diarrhea and vomiting.   Genitourinary: Positive for difficulty urinating.   Musculoskeletal: Negative for neck pain and neck stiffness.   Skin: Negative for rash.   Neurological: Negative for tremors, weakness, numbness and headaches.   Psychiatric/Behavioral: Negative for agitation, behavioral problems and confusion.     Objective:     Vital Signs (Most Recent):  Temp: 98 °F (36.7 °C) (09/16/17 1130)  Pulse: (!) 52 (09/16/17 1130)  Resp: 18 (09/16/17 1130)  BP: (!) 173/72 (09/16/17 1130)  SpO2: (!) 93 % (09/16/17 1130) Vital Signs (24h Range):  Temp:  [97.6 °F (36.4 °C)-98.4 °F (36.9 °C)] 98 °F (36.7 °C)  Pulse:  [52-59] 52  Resp:  [16-20] 18  SpO2:  [93 %-98 %] 93 %  BP: (173-211)/(72-93) 173/72     Weight: (!) 145.6 kg (321 lb)  Body mass index is 55.1 kg/m².    Intake/Output Summary (Last 24 hours) at 09/16/17 1553  Last data filed at 09/16/17 0400   Gross per 24 hour   Intake                0 ml   Output             1375 ml   Net            -1375 ml      Physical Exam   Constitutional: She is oriented to person, place, and time. She appears well-developed and well-nourished.   obese   HENT:   Head: Normocephalic and atraumatic.   Mouth/Throat: Oropharynx is clear and moist.   Eyes: Conjunctivae and EOM are normal. Pupils are equal, round, and reactive to light. No scleral icterus.   Neck: Normal range of motion. Neck supple. No thyromegaly present.   Cardiovascular: Normal rate, regular rhythm and normal heart sounds.    Pulmonary/Chest: Effort normal and breath sounds  normal. No respiratory distress. She has no wheezes. She has no rales.   Abdominal: Soft. Bowel sounds are normal. She exhibits no distension and no mass. There is no tenderness. There is no rebound and no guarding. No hernia.   Musculoskeletal: Normal range of motion. She exhibits edema. She exhibits no deformity.   Lymphadenopathy:     She has no cervical adenopathy.   Neurological: She is alert and oriented to person, place, and time. She has normal strength.   Psychiatric: She has a normal mood and affect. Her behavior is normal. Her speech is delayed. Cognition and memory are impaired. She exhibits abnormal recent memory and abnormal remote memory.   Sundowning at night She is attentive.       Significant Labs:   CBC:     Recent Labs  Lab 09/15/17  0426 09/16/17  0705   WBC 5.18 4.15   HGB 12.5 12.0   HCT 38.5 38.0    219     CMP:     Recent Labs  Lab 09/15/17  0426 09/16/17  0705    142   K 3.7 3.7    101   CO2 33* 33*   * 139*   BUN 13 13   CREATININE 1.1 1.0   CALCIUM 9.5 9.1   ANIONGAP 8 8   EGFRNONAA 49.9* 56.0*

## 2017-09-16 NOTE — PROGRESS NOTES
"[facility] Hospital Medicine  Progress Note    Patient Name: Blue Cassidy  MRN: [id number]  Patient Class: IP- Inpatient   Admission Date: 9/6/2017  Length of Stay: 10 days  Attending Physician: Torrey Maya MD  Primary Care Provider: Primary Doctor [facility] Medicine Team: [facility]OSP MED B Torrey Maya MD    Subjective:     Principal Problem:Acute encephalopathy    HPI:  Ms. Cassidy is a 72yo lady with a past medical history of   She now comes as a transfer from [facility] after being admitted there on 9/4/17 with acute altered mental status with agitation.  She had a CT head done there at admission which revealed, "right parietal lobe ischemia."  She was also found to have a, "slightly abnormal UA" and was started on Rocephin 1g iv q24 hours.  She was admitted to the IM service and Neurology was consulted, who recommended an MRI brain.  This was done, but was of such a poor quality that is was un-interpretable.  The night after she was admitted she, "had two focal seizures.  The patient was placed on IV Keppra and an EEG was obtained this morning."  She also had to be given 2mg of iv haldol to control some combativeness.  Since receiving all of these therapies, she has been lethargic and unable to take anything by mouth.       Hospital Course:  9/7 - pt more alert, still not back to baseline mental status accord to family,  Neurology saw pt this am,  2 am, labs collected,  EEG ordered  9/8 - spoke w anesthesia fellow yesterday,  Place another consutlt for anesthesia today for LP and MRI under sedation,  Spoke to neurology fellow and he is going to set up procedures.  9/9- up in chair, verbal and interacting with caretakers, eating a ookie by herself.  LP + protein, 270, MRI - consistent w chronic microvasc disease, and old small stroke w encephalomalasia right fromtal lobe and cerebro volume loss.   K 3.2  9/10 - developed acute urinary retention, holley " placed, discussed care of kishan Hall and Malgorzata, her family present.  Work excuse given to Malgorzata Whalen.  Pt still confused, still waitng on HSV and other lab. See palliative care note below  9/11 - family at bedside reports pt did get agitated but they were able to hansal it.  Will add seroquel prn    Interval History: woke up this am and talking, but still confused    Review of Systems   Constitutional: Negative for chills, diaphoresis, fatigue and fever.   HENT: Negative for congestion, sinus pain and trouble swallowing.    Respiratory: Negative for cough and shortness of breath.    Cardiovascular: Negative for chest pain and leg swelling.   Gastrointestinal: Negative for abdominal distention, abdominal pain, blood in stool, constipation, diarrhea and vomiting.   Genitourinary: Positive for difficulty urinating.   Musculoskeletal: Negative for neck pain and neck stiffness.   Skin: Negative for rash.   Neurological: Negative for tremors, weakness, numbness and headaches.   Psychiatric/Behavioral: Negative for agitation, behavioral problems and confusion.     Objective:     Vital Signs (Most Recent):  Temp: 98 °F (36.7 °C) (09/16/17 1130)  Pulse: (!) 52 (09/16/17 1130)  Resp: 18 (09/16/17 1130)  BP: (!) 173/72 (09/16/17 1130)  SpO2: (!) 93 % (09/16/17 1130) Vital Signs (24h Range):  Temp:  [97.6 °F (36.4 °C)-98.4 °F (36.9 °C)] 98 °F (36.7 °C)  Pulse:  [52-59] 52  Resp:  [16-20] 18  SpO2:  [93 %-98 %] 93 %  BP: (173-211)/(72-93) 173/72     Weight: (!) 145.6 kg (321 lb)  Body mass index is 55.1 kg/m².    Intake/Output Summary (Last 24 hours) at 09/16/17 1553  Last data filed at 09/16/17 0400   Gross per 24 hour   Intake                0 ml   Output             1375 ml   Net            -1375 ml      Physical Exam   Constitutional: She is oriented to person, place, and time. She appears well-developed and well-nourished.   obese   HENT:   Head: Normocephalic and atraumatic.   Mouth/Throat: Oropharynx is clear and  moist.   Eyes: Conjunctivae and EOM are normal. Pupils are equal, round, and reactive to light. No scleral icterus.   Neck: Normal range of motion. Neck supple. No thyromegaly present.   Cardiovascular: Normal rate, regular rhythm and normal heart sounds.    Pulmonary/Chest: Effort normal and breath sounds normal. No respiratory distress. She has no wheezes. She has no rales.   Abdominal: Soft. Bowel sounds are normal. She exhibits no distension and no mass. There is no tenderness. There is no rebound and no guarding. No hernia.   Musculoskeletal: Normal range of motion. She exhibits edema. She exhibits no deformity.   Lymphadenopathy:     She has no cervical adenopathy.   Neurological: She is alert and oriented to person, place, and time. She has normal strength.   Psychiatric: She has a normal mood and affect. Her behavior is normal. Her speech is delayed. Cognition and memory are impaired. She exhibits abnormal recent memory and abnormal remote memory.   Sundowning at night She is attentive.       Significant Labs:   CBC:     Recent Labs  Lab 09/15/17  0426 09/16/17  0705   WBC 5.18 4.15   HGB 12.5 12.0   HCT 38.5 38.0    219     CMP:     Recent Labs  Lab 09/15/17  0426 09/16/17  0705    142   K 3.7 3.7    101   CO2 33* 33*   * 139*   BUN 13 13   CREATININE 1.1 1.0   CALCIUM 9.5 9.1   ANIONGAP 8 8   EGFRNONAA 49.9* 56.0*           Assessment/Plan:      * Acute encephalopathy    9/11 - waiting on HSV,if positive will need 8 weeks of IV acyclovir,   will transfer to rehab when accepted,  F/u neurology for rest of results and viral studies. Add prn seoquel q HS for agitation.   9/10 -  Discussed nature of brain disorder w Malgorzata and Isabel, who are asking questions.  Pt has acute encephalitis, which may improve,has evidence of mild dementia and evidence of old stroke per MRI, and active delerium presently, which may also improve.  Discussed cardiac effects of anti-psychotic agents and would  use them only if behavior can't be controlled.  They intend on taking her home. MS likely to improve in a stable environment.   24 hour EEG ordered.  9/9 - LP results pending, + protein suggest possible viral etiology  9/8- atempting to set up LP and MRI under sedation with anesthesia    LP is WNL, HSV is neg, will stop acyclovir        Palliative care encounter    Non-hospice Palliative Care:   Does patient have Capacity? no  Goals- improvement of MS w treatment of encephalitis  Code Status- FULL, we discussed code status and  family contemplating change  Pain management- stable  Prognosis-  good  Family discussions-9/10 - Isabel and Malgorzata asked me about hospice.  They want it in order to get DME at home.  Pt's  did well on hospice and did not want heroic measures.  They think she has the same wishes but want to discuss with other family members.  Functional Status - up in chair w assistance, some combativeness last night, chronic indwelling holley or I&O caths needed.    Post acute care plan:  home w HH  And DME               Acute urinary retention    Holley placed  placement may improve delirium  Needs to keep it for home  Discussed management of holley w several family members - can do a voiding trial with monthly holley changes.            Hypokalemia    9/8 - kadur 40 x 2        CHF (congestive heart failure)    stable          HTN (hypertension), malignant    -Start iv lopressor 5mg iv q6 hours  -Telemetry  -2D Echo with cfd    9/7 - 153/81  9/10 - stable, 158/ 70, resume coreg and cozzar  9/11 - 145/98 : increased cozaar to 50 daily        Type 2 diabetes mellitus with complication    Recent Labs      09/10/17   0801  09/10/17   1225  09/10/17   1653  09/10/17   2101  09/11/17   0730  09/11/17   1139   POCTGLUCOSE  139*  164*  147*  160*  103  190*     On SS          Abnormal CT of the head    9/6/17:  1.2 cm area of hypodensity in the right parietal lobe without obvious cytotoxic edema, given irregular  margins and confinement to white matter.  Findings most likely represent components of chronic small vessel ischemic disease.  Nonspecific hypodensity in the right cerebellar hemisphere.  MRI of the brain without and with contrast obtained for further evaluation.    9/8/17 MRI - consistent w chronic microvasc disease, and old small stroke w encephalomalasia right fromtal lobe and cerebro volume loss.          Seizure    9/11 - stable in hospital, MS somewhat improved  New onset  keppra   eeg  Neurology consulted          Urinary tract infection without hematuria    Rocephin started  9/9 - day 3, no cultures here, UA was abnormal from outside hospital.  Will dc rocephin and reculture the urine, repeat UA.  No evidece of bacterial meinigitis  Needs to keep foely for chronic urinary retention.  Family can't preform I&O caths given her MS.  Voiding trial monthly w holley changes.          Delirium    Suspect seizure with viral encephalitis  keppra started              VTE Risk Mitigation         Ordered     enoxaparin injection 40 mg  Daily     Route:  Subcutaneous        09/06/17 1954     Medium Risk of VTE  Once      09/06/17 1954     Place sequential compression device  Until discontinued      09/06/17 1954     Place ROSITA hose  Until discontinued      09/06/17 1954              Torrey Maya MD  Department of Hospital Medicine   [facility]

## 2017-09-16 NOTE — PROGRESS NOTES
2000  Notified on call of patient BP= 203/81.  Given verbal order to give PRN hydaralazine 25 mg and scheduled hydralazine 25mg early per telephone order.  0000  Notified on call again for elevated BP = 183/73.  One time dose of hydralazine given 25 mg.  0330  Notified on call again for elevated BP = 211/93.  Patient given one time dose of 50 mg hydralazine.  Provider, CARA Gomez to report to next shift that patient needs more chart review to try to remedy the BP.    0400  Venipuncture rescheduled labwork draw due to inability to find location to draw blood.

## 2017-09-17 PROBLEM — R93.0 ABNORMAL CT OF THE HEAD: Status: RESOLVED | Noted: 2017-09-07 | Resolved: 2017-09-17

## 2017-09-17 LAB
ANION GAP SERPL CALC-SCNC: 11 MMOL/L
BASOPHILS # BLD AUTO: 0.02 K/UL
BASOPHILS NFR BLD: 0.5 %
BUN SERPL-MCNC: 12 MG/DL
CALCIUM SERPL-MCNC: 9 MG/DL
CHLORIDE SERPL-SCNC: 103 MMOL/L
CO2 SERPL-SCNC: 28 MMOL/L
CREAT SERPL-MCNC: 0.9 MG/DL
DIFFERENTIAL METHOD: ABNORMAL
EOSINOPHIL # BLD AUTO: 0.1 K/UL
EOSINOPHIL NFR BLD: 2.3 %
ERYTHROCYTE [DISTWIDTH] IN BLOOD BY AUTOMATED COUNT: 14.3 %
EST. GFR  (AFRICAN AMERICAN): >60 ML/MIN/1.73 M^2
EST. GFR  (NON AFRICAN AMERICAN): >60 ML/MIN/1.73 M^2
GLUCOSE SERPL-MCNC: 122 MG/DL
HCT VFR BLD AUTO: 36.1 %
HGB BLD-MCNC: 11.8 G/DL
LYMPHOCYTES # BLD AUTO: 1.8 K/UL
LYMPHOCYTES NFR BLD: 41 %
MAGNESIUM SERPL-MCNC: 1.4 MG/DL
MCH RBC QN AUTO: 28.6 PG
MCHC RBC AUTO-ENTMCNC: 32.7 G/DL
MCV RBC AUTO: 87 FL
MONOCYTES # BLD AUTO: 0.5 K/UL
MONOCYTES NFR BLD: 12.4 %
NEUTROPHILS # BLD AUTO: 1.9 K/UL
NEUTROPHILS NFR BLD: 43.3 %
PHOSPHATE SERPL-MCNC: 3.2 MG/DL
PLATELET # BLD AUTO: 201 K/UL
PMV BLD AUTO: 10.5 FL
POCT GLUCOSE: 105 MG/DL (ref 70–110)
POCT GLUCOSE: 113 MG/DL (ref 70–110)
POCT GLUCOSE: 163 MG/DL (ref 70–110)
POCT GLUCOSE: 193 MG/DL (ref 70–110)
POTASSIUM SERPL-SCNC: 3.7 MMOL/L
RBC # BLD AUTO: 4.13 M/UL
SODIUM SERPL-SCNC: 142 MMOL/L
WBC # BLD AUTO: 4.34 K/UL

## 2017-09-17 PROCEDURE — 84100 ASSAY OF PHOSPHORUS: CPT

## 2017-09-17 PROCEDURE — 25000003 PHARM REV CODE 250: Performed by: HOSPITALIST

## 2017-09-17 PROCEDURE — 85025 COMPLETE CBC W/AUTO DIFF WBC: CPT

## 2017-09-17 PROCEDURE — 25000003 PHARM REV CODE 250: Performed by: PHYSICIAN ASSISTANT

## 2017-09-17 PROCEDURE — 99233 SBSQ HOSP IP/OBS HIGH 50: CPT | Mod: ,,, | Performed by: INTERNAL MEDICINE

## 2017-09-17 PROCEDURE — 63600175 PHARM REV CODE 636 W HCPCS: Performed by: INTERNAL MEDICINE

## 2017-09-17 PROCEDURE — 25000003 PHARM REV CODE 250: Performed by: INTERNAL MEDICINE

## 2017-09-17 PROCEDURE — 36415 COLL VENOUS BLD VENIPUNCTURE: CPT

## 2017-09-17 PROCEDURE — 11000001 HC ACUTE MED/SURG PRIVATE ROOM

## 2017-09-17 PROCEDURE — 83735 ASSAY OF MAGNESIUM: CPT

## 2017-09-17 PROCEDURE — 80048 BASIC METABOLIC PNL TOTAL CA: CPT

## 2017-09-17 PROCEDURE — 25000003 PHARM REV CODE 250: Performed by: NURSE PRACTITIONER

## 2017-09-17 RX ADMIN — HYDRALAZINE HYDROCHLORIDE 50 MG: 50 TABLET ORAL at 09:09

## 2017-09-17 RX ADMIN — CARVEDILOL 25 MG: 25 TABLET, FILM COATED ORAL at 08:09

## 2017-09-17 RX ADMIN — HYDRALAZINE HYDROCHLORIDE 50 MG: 50 TABLET ORAL at 05:09

## 2017-09-17 RX ADMIN — LOSARTAN POTASSIUM 100 MG: 50 TABLET, FILM COATED ORAL at 08:09

## 2017-09-17 RX ADMIN — ASPIRIN 325 MG ORAL TABLET 325 MG: 325 PILL ORAL at 08:09

## 2017-09-17 RX ADMIN — CARVEDILOL 25 MG: 25 TABLET, FILM COATED ORAL at 09:09

## 2017-09-17 RX ADMIN — LEVOTHYROXINE SODIUM 50 MCG: 50 TABLET ORAL at 05:09

## 2017-09-17 RX ADMIN — HYDRALAZINE HYDROCHLORIDE 50 MG: 50 TABLET ORAL at 03:09

## 2017-09-17 RX ADMIN — QUETIAPINE FUMARATE 25 MG: 25 TABLET, FILM COATED ORAL at 09:09

## 2017-09-17 RX ADMIN — POTASSIUM CHLORIDE 10 MEQ: 750 CAPSULE, EXTENDED RELEASE ORAL at 08:09

## 2017-09-17 RX ADMIN — ENOXAPARIN SODIUM 40 MG: 100 INJECTION SUBCUTANEOUS at 05:09

## 2017-09-17 NOTE — SUBJECTIVE & OBJECTIVE
Interval History: woke up this am and talking, but still confused    Review of Systems   Constitutional: Negative for chills, diaphoresis, fatigue and fever.   HENT: Negative for congestion, sinus pain and trouble swallowing.    Respiratory: Negative for cough and shortness of breath.    Cardiovascular: Negative for chest pain and leg swelling.   Gastrointestinal: Negative for abdominal distention, abdominal pain, blood in stool, constipation, diarrhea and vomiting.   Genitourinary: Positive for difficulty urinating.   Musculoskeletal: Negative for neck pain and neck stiffness.   Skin: Negative for rash.   Neurological: Negative for tremors, weakness, numbness and headaches.   Psychiatric/Behavioral: Negative for agitation, behavioral problems and confusion.     Objective:     Vital Signs (Most Recent):  Temp: 98.3 °F (36.8 °C) (09/17/17 1148)  Pulse: (!) 55 (09/17/17 1148)  Resp: 18 (09/17/17 1148)  BP: (!) 143/89 (09/17/17 1148)  SpO2: 95 % (09/17/17 1148) Vital Signs (24h Range):  Temp:  [97.4 °F (36.3 °C)-98.4 °F (36.9 °C)] 98.3 °F (36.8 °C)  Pulse:  [54-62] 55  Resp:  [18] 18  SpO2:  [95 %-97 %] 95 %  BP: (125-182)/(67-89) 143/89     Weight: (!) 145.6 kg (321 lb)  Body mass index is 55.1 kg/m².    Intake/Output Summary (Last 24 hours) at 09/17/17 1430  Last data filed at 09/17/17 0600   Gross per 24 hour   Intake              560 ml   Output             1800 ml   Net            -1240 ml      Physical Exam   Constitutional: She is oriented to person, place, and time. She appears well-developed and well-nourished.   obese   HENT:   Head: Normocephalic and atraumatic.   Mouth/Throat: Oropharynx is clear and moist.   Eyes: Conjunctivae and EOM are normal. Pupils are equal, round, and reactive to light. No scleral icterus.   Neck: Normal range of motion. Neck supple. No thyromegaly present.   Cardiovascular: Normal rate, regular rhythm and normal heart sounds.    Pulmonary/Chest: Effort normal and breath sounds  normal. No respiratory distress. She has no wheezes. She has no rales.   Abdominal: Soft. Bowel sounds are normal. She exhibits no distension and no mass. There is no tenderness. There is no rebound and no guarding. No hernia.   Musculoskeletal: Normal range of motion. She exhibits edema. She exhibits no deformity.   Lymphadenopathy:     She has no cervical adenopathy.   Neurological: She is alert and oriented to person, place, and time. She has normal strength.   Psychiatric: She has a normal mood and affect. Her behavior is normal. Her speech is delayed. Cognition and memory are impaired. She exhibits abnormal recent memory and abnormal remote memory.   Sundowning at night She is attentive.       Significant Labs:   CBC:     Recent Labs  Lab 09/16/17  0705 09/17/17  0514   WBC 4.15 4.34   HGB 12.0 11.8*   HCT 38.0 36.1*    201     CMP:     Recent Labs  Lab 09/16/17  0705 09/17/17  0514    142   K 3.7 3.7    103   CO2 33* 28   * 122*   BUN 13 12   CREATININE 1.0 0.9   CALCIUM 9.1 9.0   ANIONGAP 8 11   EGFRNONAA 56.0* >60.0

## 2017-09-17 NOTE — PLAN OF CARE
Problem: Patient Care Overview  Goal: Plan of Care Review  Outcome: Ongoing (interventions implemented as appropriate)  Patient is AAOx4. Vital signs stable. No falls throughout shift. Patient ambulates with assistance. Blood glucose monitored.

## 2017-09-17 NOTE — PLAN OF CARE
Problem: Patient Care Overview  Goal: Plan of Care Review  Outcome: Ongoing (interventions implemented as appropriate)  POC reviewed w/ pt and daughter. Both verbalized understanding. BP elevated at the beginning of the shift. BP corrected w/ scheduled hypertensive medications. No complaints of pain and no injuries sustained on this shift. Pt remains a, a, & o x 4. WCTM.

## 2017-09-17 NOTE — PROGRESS NOTES
"[facility] Hospital Medicine  Progress Note    Patient Name: Blue Cassidy  MRN: [id number]  Patient Class: IP- Inpatient   Admission Date: 9/6/2017  Length of Stay: 11 days  Attending Physician: Torrey Maya MD  Primary Care Provider: Primary [facility] Medicine Team: [facility] B Torrey Maya MD    Subjective:     Principal Problem:Acute encephalopathy    HPI:  Ms. Cassidy is a 70yo lady with a past medical history of   She now comes as a transfer from [facility] after being admitted there on 9/4/17 with acute altered mental status with agitation.  She had a CT head done there at admission which revealed, "right parietal lobe ischemia."  She was also found to have a, "slightly abnormal UA" and was started on Rocephin 1g iv q24 hours.  She was admitted to the IM service and Neurology was consulted, who recommended an MRI brain.  This was done, but was of such a poor quality that is was un-interpretable.  The night after she was admitted she, "had two focal seizures.  The patient was placed on IV Keppra and an EEG was obtained this morning."  She also had to be given 2mg of iv haldol to control some combativeness.  Since receiving all of these therapies, she has been lethargic and unable to take anything by mouth.       Hospital Course:  9/7 - pt more alert, still not back to baseline mental status accord to family,  Neurology saw pt this am,  2 am, labs collected,  EEG ordered  9/8 - spoke w anesthesia fellow yesterday,  Place another consutlt for anesthesia today for LP and MRI under sedation,  Spoke to neurology fellow and he is going to set up procedures.  9/9- up in chair, verbal and interacting with caretakers, eating a ookie by herself.  LP + protein, 270, MRI - consistent w chronic microvasc disease, and old small stroke w encephalomalasia right fromtal lobe and cerebro volume loss.   K 3.2  9/10 - developed acute urinary retention, holley " placed, discussed care of kishan Hall and Malgorzata, her family present.  Work excuse given to Malgorzata Whalen.  Pt still confused, still waitng on HSV and other lab. See palliative care note below  9/11 - family at bedside reports pt did get agitated but they were able to hansal it.  Will add seroquel prn    Interval History: woke up this am and talking, but still confused    Review of Systems   Constitutional: Negative for chills, diaphoresis, fatigue and fever.   HENT: Negative for congestion, sinus pain and trouble swallowing.    Respiratory: Negative for cough and shortness of breath.    Cardiovascular: Negative for chest pain and leg swelling.   Gastrointestinal: Negative for abdominal distention, abdominal pain, blood in stool, constipation, diarrhea and vomiting.   Genitourinary: Positive for difficulty urinating.   Musculoskeletal: Negative for neck pain and neck stiffness.   Skin: Negative for rash.   Neurological: Negative for tremors, weakness, numbness and headaches.   Psychiatric/Behavioral: Negative for agitation, behavioral problems and confusion.     Objective:     Vital Signs (Most Recent):  Temp: 98.3 °F (36.8 °C) (09/17/17 1148)  Pulse: (!) 55 (09/17/17 1148)  Resp: 18 (09/17/17 1148)  BP: (!) 143/89 (09/17/17 1148)  SpO2: 95 % (09/17/17 1148) Vital Signs (24h Range):  Temp:  [97.4 °F (36.3 °C)-98.4 °F (36.9 °C)] 98.3 °F (36.8 °C)  Pulse:  [54-62] 55  Resp:  [18] 18  SpO2:  [95 %-97 %] 95 %  BP: (125-182)/(67-89) 143/89     Weight: (!) 145.6 kg (321 lb)  Body mass index is 55.1 kg/m².    Intake/Output Summary (Last 24 hours) at 09/17/17 1430  Last data filed at 09/17/17 0600   Gross per 24 hour   Intake              560 ml   Output             1800 ml   Net            -1240 ml      Physical Exam   Constitutional: She is oriented to person, place, and time. She appears well-developed and well-nourished.   obese   HENT:   Head: Normocephalic and atraumatic.   Mouth/Throat: Oropharynx is clear and  moist.   Eyes: Conjunctivae and EOM are normal. Pupils are equal, round, and reactive to light. No scleral icterus.   Neck: Normal range of motion. Neck supple. No thyromegaly present.   Cardiovascular: Normal rate, regular rhythm and normal heart sounds.    Pulmonary/Chest: Effort normal and breath sounds normal. No respiratory distress. She has no wheezes. She has no rales.   Abdominal: Soft. Bowel sounds are normal. She exhibits no distension and no mass. There is no tenderness. There is no rebound and no guarding. No hernia.   Musculoskeletal: Normal range of motion. She exhibits edema. She exhibits no deformity.   Lymphadenopathy:     She has no cervical adenopathy.   Neurological: She is alert and oriented to person, place, and time. She has normal strength.   Psychiatric: She has a normal mood and affect. Her behavior is normal. Her speech is delayed. Cognition and memory are impaired. She exhibits abnormal recent memory and abnormal remote memory.   Sundowning at night She is attentive.       Significant Labs:   CBC:     Recent Labs  Lab 09/16/17  0705 09/17/17  0514   WBC 4.15 4.34   HGB 12.0 11.8*   HCT 38.0 36.1*    201     CMP:     Recent Labs  Lab 09/16/17  0705 09/17/17  0514    142   K 3.7 3.7    103   CO2 33* 28   * 122*   BUN 13 12   CREATININE 1.0 0.9   CALCIUM 9.1 9.0   ANIONGAP 8 11   EGFRNONAA 56.0* >60.0           Assessment/Plan:      * Acute encephalopathy    9/11 - waiting on HSV,if positive will need 8 weeks of IV acyclovir,   will transfer to rehab when accepted,  F/u neurology for rest of results and viral studies. Add prn seoquel q HS for agitation.   9/10 -  Discussed nature of brain disorder w Malgorzata and Isabel, who are asking questions.  Pt has acute encephalitis, which may improve,has evidence of mild dementia and evidence of old stroke per MRI, and active delerium presently, which may also improve.  Discussed cardiac effects of anti-psychotic agents and  would use them only if behavior can't be controlled.  They intend on taking her home. MS likely to improve in a stable environment.   24 hour EEG ordered.  9/9 - LP results pending, + protein suggest possible viral etiology  9/8- atempting to set up LP and MRI under sedation with anesthesia    LP is WNL, HSV is neg, will stop acyclovir        Palliative care encounter    Non-hospice Palliative Care:   Does patient have Capacity? no  Goals- improvement of MS w treatment of encephalitis  Code Status- FULL, we discussed code status and  family contemplating change  Pain management- stable  Prognosis-  good  Family discussions-9/10 - Isabel and Malgorzata asked me about hospice.  They want it in order to get DME at home.  Pt's  did well on hospice and did not want heroic measures.  They think she has the same wishes but want to discuss with other family members.  Functional Status - up in chair w assistance, some combativeness last night, chronic indwelling holley or I&O caths needed.    Post acute care plan:  home w HH  And DME               Acute urinary retention    Holley placed  placement may improve delirium  Needs to keep it for home  Discussed management of holley w several family members - can do a voiding trial with monthly holley changes.            Hypokalemia    9/8 - kadur 40 x 2        CHF (congestive heart failure)    stable          HTN (hypertension), malignant    -Start iv lopressor 5mg iv q6 hours  -Telemetry  -2D Echo with cfd    9/7 - 153/81  9/10 - stable, 158/ 70, resume coreg and cozzar  9/11 - 145/98 : increased cozaar to 50 daily        Type 2 diabetes mellitus with complication    Recent Labs      09/10/17   0801  09/10/17   1225  09/10/17   1653  09/10/17   2101  09/11/17   0730  09/11/17   1139   POCTGLUCOSE  139*  164*  147*  160*  103  190*     On SS          Seizure    9/11 - stable in hospital, MS somewhat improved  New onset  keppra   eeg  Neurology consulted          Urinary tract  infection without hematuria    Rocephin started  9/9 - day 3, no cultures here, UA was abnormal from outside hospital.  Will dc rocephin and reculture the urine, repeat UA.  No evidece of bacterial meinigitis  Needs to keep foely for chronic urinary retention.  Family can't preform I&O caths given her MS.  Voiding trial monthly w holley changes.          Delirium    Suspect seizure with viral encephalitis  keppra started              VTE Risk Mitigation         Ordered     enoxaparin injection 40 mg  Daily     Route:  Subcutaneous        09/06/17 1954     Medium Risk of VTE  Once      09/06/17 1954     Place sequential compression device  Until discontinued      09/06/17 1954     Place ROSITA hose  Until discontinued      09/06/17 1954              Torrey Maya MD  Department of Hospital Medicine   [facility]

## 2017-09-18 ENCOUNTER — HOSPITAL ENCOUNTER (INPATIENT)
Facility: HOSPITAL | Age: 72
LOS: 7 days | Discharge: HOME HEALTH | DRG: 071 | End: 2017-09-25
Attending: INTERNAL MEDICINE | Admitting: INTERNAL MEDICINE
Payer: MEDICARE

## 2017-09-18 VITALS
SYSTOLIC BLOOD PRESSURE: 146 MMHG | HEART RATE: 53 BPM | HEIGHT: 64 IN | WEIGHT: 293 LBS | OXYGEN SATURATION: 96 % | TEMPERATURE: 99 F | DIASTOLIC BLOOD PRESSURE: 72 MMHG | BODY MASS INDEX: 50.02 KG/M2 | RESPIRATION RATE: 17 BRPM

## 2017-09-18 DIAGNOSIS — E03.9 ACQUIRED HYPOTHYROIDISM: ICD-10-CM

## 2017-09-18 DIAGNOSIS — R33.8 ACUTE URINARY RETENTION: ICD-10-CM

## 2017-09-18 DIAGNOSIS — I63.9 CEREBROVASCULAR ACCIDENT (CVA), UNSPECIFIED MECHANISM: ICD-10-CM

## 2017-09-18 DIAGNOSIS — E11.8 TYPE 2 DIABETES MELLITUS WITH COMPLICATION, WITHOUT LONG-TERM CURRENT USE OF INSULIN: ICD-10-CM

## 2017-09-18 DIAGNOSIS — I50.22 CHRONIC SYSTOLIC CONGESTIVE HEART FAILURE: ICD-10-CM

## 2017-09-18 DIAGNOSIS — J44.9 CHRONIC OBSTRUCTIVE PULMONARY DISEASE, UNSPECIFIED COPD TYPE: ICD-10-CM

## 2017-09-18 DIAGNOSIS — R53.81 DEBILITY: ICD-10-CM

## 2017-09-18 DIAGNOSIS — E87.6 HYPOKALEMIA: ICD-10-CM

## 2017-09-18 DIAGNOSIS — I10 HTN (HYPERTENSION), MALIGNANT: ICD-10-CM

## 2017-09-18 DIAGNOSIS — R41.82 ALTERED MENTAL STATUS, UNSPECIFIED ALTERED MENTAL STATUS TYPE: ICD-10-CM

## 2017-09-18 DIAGNOSIS — N39.0 URINARY TRACT INFECTION WITHOUT HEMATURIA, SITE UNSPECIFIED: ICD-10-CM

## 2017-09-18 DIAGNOSIS — G93.40 ACUTE ENCEPHALOPATHY: Primary | ICD-10-CM

## 2017-09-18 LAB
ANION GAP SERPL CALC-SCNC: 8 MMOL/L
BASOPHILS # BLD AUTO: 0.02 K/UL
BASOPHILS NFR BLD: 0.4 %
BILIRUB UR QL STRIP: NEGATIVE
BUN SERPL-MCNC: 14 MG/DL
CALCIUM SERPL-MCNC: 8.8 MG/DL
CHLORIDE SERPL-SCNC: 105 MMOL/L
CLARITY UR REFRACT.AUTO: CLEAR
CO2 SERPL-SCNC: 30 MMOL/L
COLOR UR AUTO: YELLOW
CREAT SERPL-MCNC: 1 MG/DL
DIFFERENTIAL METHOD: ABNORMAL
EOSINOPHIL # BLD AUTO: 0.1 K/UL
EOSINOPHIL NFR BLD: 1.9 %
ERYTHROCYTE [DISTWIDTH] IN BLOOD BY AUTOMATED COUNT: 14.3 %
EST. GFR  (AFRICAN AMERICAN): >60 ML/MIN/1.73 M^2
EST. GFR  (NON AFRICAN AMERICAN): 56 ML/MIN/1.73 M^2
GLUCOSE SERPL-MCNC: 160 MG/DL
GLUCOSE UR QL STRIP: NEGATIVE
HCT VFR BLD AUTO: 34.8 %
HGB BLD-MCNC: 11.3 G/DL
HGB UR QL STRIP: ABNORMAL
KETONES UR QL STRIP: NEGATIVE
LEUKOCYTE ESTERASE UR QL STRIP: NEGATIVE
LYMPHOCYTES # BLD AUTO: 2.2 K/UL
LYMPHOCYTES NFR BLD: 41.9 %
MAGNESIUM SERPL-MCNC: 1.6 MG/DL
MCH RBC QN AUTO: 28.5 PG
MCHC RBC AUTO-ENTMCNC: 32.5 G/DL
MCV RBC AUTO: 88 FL
MICROSCOPIC COMMENT: ABNORMAL
MONOCYTES # BLD AUTO: 0.5 K/UL
MONOCYTES NFR BLD: 9.9 %
NEUTROPHILS # BLD AUTO: 2.4 K/UL
NEUTROPHILS NFR BLD: 45.5 %
NITRITE UR QL STRIP: NEGATIVE
NON-SQ EPI CELLS #/AREA URNS AUTO: <1 /HPF
PH UR STRIP: 7 [PH] (ref 5–8)
PHOSPHATE SERPL-MCNC: 3.4 MG/DL
PLATELET # BLD AUTO: 222 K/UL
PMV BLD AUTO: 10.1 FL
POCT GLUCOSE: 152 MG/DL (ref 70–110)
POCT GLUCOSE: 158 MG/DL (ref 70–110)
POCT GLUCOSE: 169 MG/DL (ref 70–110)
POCT GLUCOSE: 189 MG/DL (ref 70–110)
POTASSIUM SERPL-SCNC: 3.6 MMOL/L
PROT UR QL STRIP: NEGATIVE
RBC # BLD AUTO: 3.96 M/UL
RBC #/AREA URNS AUTO: >100 /HPF (ref 0–4)
SODIUM SERPL-SCNC: 143 MMOL/L
SP GR UR STRIP: 1.01 (ref 1–1.03)
SQUAMOUS #/AREA URNS AUTO: 4 /HPF
URN SPEC COLLECT METH UR: ABNORMAL
UROBILINOGEN UR STRIP-ACNC: NEGATIVE EU/DL
WBC # BLD AUTO: 5.27 K/UL
WBC #/AREA URNS AUTO: 3 /HPF (ref 0–5)

## 2017-09-18 PROCEDURE — 25000003 PHARM REV CODE 250: Performed by: HOSPITALIST

## 2017-09-18 PROCEDURE — 85025 COMPLETE CBC W/AUTO DIFF WBC: CPT

## 2017-09-18 PROCEDURE — 25000003 PHARM REV CODE 250: Performed by: PHYSICIAN ASSISTANT

## 2017-09-18 PROCEDURE — 99238 HOSP IP/OBS DSCHRG MGMT 30/<: CPT | Mod: ,,, | Performed by: INTERNAL MEDICINE

## 2017-09-18 PROCEDURE — 84100 ASSAY OF PHOSPHORUS: CPT

## 2017-09-18 PROCEDURE — 25000003 PHARM REV CODE 250: Performed by: NURSE PRACTITIONER

## 2017-09-18 PROCEDURE — 36415 COLL VENOUS BLD VENIPUNCTURE: CPT

## 2017-09-18 PROCEDURE — 87186 SC STD MICRODIL/AGAR DIL: CPT

## 2017-09-18 PROCEDURE — 25000003 PHARM REV CODE 250: Performed by: INTERNAL MEDICINE

## 2017-09-18 PROCEDURE — 97530 THERAPEUTIC ACTIVITIES: CPT

## 2017-09-18 PROCEDURE — 81001 URINALYSIS AUTO W/SCOPE: CPT

## 2017-09-18 PROCEDURE — 12000000 HC SNF SEMI-PRIVATE ROOM

## 2017-09-18 PROCEDURE — 87086 URINE CULTURE/COLONY COUNT: CPT

## 2017-09-18 PROCEDURE — 87077 CULTURE AEROBIC IDENTIFY: CPT

## 2017-09-18 PROCEDURE — 80048 BASIC METABOLIC PNL TOTAL CA: CPT

## 2017-09-18 PROCEDURE — 87088 URINE BACTERIA CULTURE: CPT

## 2017-09-18 PROCEDURE — 83735 ASSAY OF MAGNESIUM: CPT

## 2017-09-18 RX ORDER — POTASSIUM CHLORIDE 750 MG/1
10 CAPSULE, EXTENDED RELEASE ORAL DAILY
Status: DISCONTINUED | OUTPATIENT
Start: 2017-09-19 | End: 2017-09-25 | Stop reason: HOSPADM

## 2017-09-18 RX ORDER — LEVOTHYROXINE SODIUM 50 UG/1
50 TABLET ORAL
Status: DISCONTINUED | OUTPATIENT
Start: 2017-09-19 | End: 2017-09-25 | Stop reason: HOSPADM

## 2017-09-18 RX ORDER — ONDANSETRON 2 MG/ML
4 INJECTION INTRAMUSCULAR; INTRAVENOUS EVERY 6 HOURS PRN
Status: DISCONTINUED | OUTPATIENT
Start: 2017-09-18 | End: 2017-09-18

## 2017-09-18 RX ORDER — RAMELTEON 8 MG/1
8 TABLET ORAL NIGHTLY PRN
Status: CANCELLED | OUTPATIENT
Start: 2017-09-18

## 2017-09-18 RX ORDER — INSULIN ASPART 100 [IU]/ML
0-5 INJECTION, SOLUTION INTRAVENOUS; SUBCUTANEOUS EVERY 6 HOURS PRN
Status: DISCONTINUED | OUTPATIENT
Start: 2017-09-18 | End: 2017-09-25 | Stop reason: HOSPADM

## 2017-09-18 RX ORDER — IPRATROPIUM BROMIDE AND ALBUTEROL SULFATE 2.5; .5 MG/3ML; MG/3ML
3 SOLUTION RESPIRATORY (INHALATION) EVERY 4 HOURS PRN
Status: CANCELLED | OUTPATIENT
Start: 2017-09-18

## 2017-09-18 RX ORDER — QUETIAPINE FUMARATE 25 MG/1
25 TABLET, FILM COATED ORAL NIGHTLY PRN
Status: DISCONTINUED | OUTPATIENT
Start: 2017-09-18 | End: 2017-09-25 | Stop reason: HOSPADM

## 2017-09-18 RX ORDER — ACETAMINOPHEN 325 MG/1
650 TABLET ORAL EVERY 6 HOURS PRN
Status: CANCELLED | OUTPATIENT
Start: 2017-09-18

## 2017-09-18 RX ORDER — CALCIUM CARBONATE 200(500)MG
500 TABLET,CHEWABLE ORAL 2 TIMES DAILY PRN
Status: DISCONTINUED | OUTPATIENT
Start: 2017-09-18 | End: 2017-09-25 | Stop reason: HOSPADM

## 2017-09-18 RX ORDER — ENOXAPARIN SODIUM 100 MG/ML
40 INJECTION SUBCUTANEOUS EVERY 24 HOURS
Status: DISCONTINUED | OUTPATIENT
Start: 2017-09-18 | End: 2017-09-18

## 2017-09-18 RX ORDER — HYDRALAZINE HYDROCHLORIDE 50 MG/1
50 TABLET, FILM COATED ORAL EVERY 8 HOURS
Status: CANCELLED | OUTPATIENT
Start: 2017-09-18

## 2017-09-18 RX ORDER — AMOXICILLIN 250 MG
1 CAPSULE ORAL 2 TIMES DAILY
Status: DISCONTINUED | OUTPATIENT
Start: 2017-09-18 | End: 2017-09-25 | Stop reason: HOSPADM

## 2017-09-18 RX ORDER — LOSARTAN POTASSIUM 50 MG/1
100 TABLET ORAL DAILY
Status: CANCELLED | OUTPATIENT
Start: 2017-09-19

## 2017-09-18 RX ORDER — AMOXICILLIN 250 MG
1 CAPSULE ORAL 2 TIMES DAILY
Status: CANCELLED | OUTPATIENT
Start: 2017-09-18

## 2017-09-18 RX ORDER — POTASSIUM CHLORIDE 750 MG/1
10 CAPSULE, EXTENDED RELEASE ORAL DAILY
Status: CANCELLED | OUTPATIENT
Start: 2017-09-19

## 2017-09-18 RX ORDER — MIDAZOLAM HYDROCHLORIDE 1 MG/ML
1 INJECTION INTRAMUSCULAR; INTRAVENOUS EVERY 4 HOURS PRN
Status: DISCONTINUED | OUTPATIENT
Start: 2017-09-18 | End: 2017-09-18 | Stop reason: HOSPADM

## 2017-09-18 RX ORDER — IPRATROPIUM BROMIDE AND ALBUTEROL SULFATE 2.5; .5 MG/3ML; MG/3ML
3 SOLUTION RESPIRATORY (INHALATION) EVERY 4 HOURS PRN
Status: DISCONTINUED | OUTPATIENT
Start: 2017-09-18 | End: 2017-09-25 | Stop reason: HOSPADM

## 2017-09-18 RX ORDER — LORAZEPAM 2 MG/ML
2 INJECTION INTRAMUSCULAR EVERY 4 HOURS PRN
Status: CANCELLED | OUTPATIENT
Start: 2017-09-18

## 2017-09-18 RX ORDER — LEVOTHYROXINE SODIUM 50 UG/1
50 TABLET ORAL
Status: CANCELLED | OUTPATIENT
Start: 2017-09-19

## 2017-09-18 RX ORDER — MIDAZOLAM HYDROCHLORIDE 1 MG/ML
1 INJECTION INTRAMUSCULAR; INTRAVENOUS EVERY 4 HOURS PRN
Status: CANCELLED | OUTPATIENT
Start: 2017-09-18

## 2017-09-18 RX ORDER — ENOXAPARIN SODIUM 100 MG/ML
40 INJECTION SUBCUTANEOUS EVERY 24 HOURS
Status: CANCELLED | OUTPATIENT
Start: 2017-09-18

## 2017-09-18 RX ORDER — HYDRALAZINE HYDROCHLORIDE 50 MG/1
50 TABLET, FILM COATED ORAL EVERY 8 HOURS
Status: DISCONTINUED | OUTPATIENT
Start: 2017-09-18 | End: 2017-09-20

## 2017-09-18 RX ORDER — INSULIN ASPART 100 [IU]/ML
0-5 INJECTION, SOLUTION INTRAVENOUS; SUBCUTANEOUS EVERY 6 HOURS PRN
Status: CANCELLED | OUTPATIENT
Start: 2017-09-18

## 2017-09-18 RX ORDER — GLUCAGON 1 MG
1 KIT INJECTION
Status: DISCONTINUED | OUTPATIENT
Start: 2017-09-18 | End: 2017-09-25 | Stop reason: HOSPADM

## 2017-09-18 RX ORDER — QUETIAPINE FUMARATE 25 MG/1
25 TABLET, FILM COATED ORAL NIGHTLY PRN
Status: CANCELLED | OUTPATIENT
Start: 2017-09-18

## 2017-09-18 RX ORDER — BISACODYL 5 MG
5 TABLET, DELAYED RELEASE (ENTERIC COATED) ORAL DAILY PRN
Status: DISCONTINUED | OUTPATIENT
Start: 2017-09-18 | End: 2017-09-25 | Stop reason: HOSPADM

## 2017-09-18 RX ORDER — GLUCAGON 1 MG
1 KIT INJECTION
Status: CANCELLED | OUTPATIENT
Start: 2017-09-18

## 2017-09-18 RX ORDER — CARVEDILOL 25 MG/1
25 TABLET ORAL 2 TIMES DAILY
Status: CANCELLED | OUTPATIENT
Start: 2017-09-18

## 2017-09-18 RX ORDER — CARVEDILOL 25 MG/1
25 TABLET ORAL 2 TIMES DAILY
Status: DISCONTINUED | OUTPATIENT
Start: 2017-09-18 | End: 2017-09-25 | Stop reason: HOSPADM

## 2017-09-18 RX ORDER — ACETAMINOPHEN 325 MG/1
650 TABLET ORAL EVERY 6 HOURS PRN
Status: DISCONTINUED | OUTPATIENT
Start: 2017-09-18 | End: 2017-09-25 | Stop reason: HOSPADM

## 2017-09-18 RX ORDER — LOSARTAN POTASSIUM 50 MG/1
100 TABLET ORAL DAILY
Status: DISCONTINUED | OUTPATIENT
Start: 2017-09-19 | End: 2017-09-25 | Stop reason: HOSPADM

## 2017-09-18 RX ORDER — HYDRALAZINE HYDROCHLORIDE 25 MG/1
25 TABLET, FILM COATED ORAL EVERY 6 HOURS PRN
Status: DISCONTINUED | OUTPATIENT
Start: 2017-09-18 | End: 2017-09-25 | Stop reason: HOSPADM

## 2017-09-18 RX ORDER — CALCIUM CARBONATE 200(500)MG
500 TABLET,CHEWABLE ORAL 2 TIMES DAILY PRN
Status: CANCELLED | OUTPATIENT
Start: 2017-09-18

## 2017-09-18 RX ORDER — ENOXAPARIN SODIUM 100 MG/ML
40 INJECTION SUBCUTANEOUS EVERY 24 HOURS
Status: DISCONTINUED | OUTPATIENT
Start: 2017-09-19 | End: 2017-09-25 | Stop reason: HOSPADM

## 2017-09-18 RX ORDER — BISACODYL 5 MG
5 TABLET, DELAYED RELEASE (ENTERIC COATED) ORAL DAILY PRN
Status: CANCELLED | OUTPATIENT
Start: 2017-09-18

## 2017-09-18 RX ORDER — LORAZEPAM 2 MG/ML
2 INJECTION INTRAMUSCULAR EVERY 4 HOURS PRN
Status: DISCONTINUED | OUTPATIENT
Start: 2017-09-18 | End: 2017-09-18

## 2017-09-18 RX ORDER — HYDRALAZINE HYDROCHLORIDE 25 MG/1
25 TABLET, FILM COATED ORAL EVERY 6 HOURS PRN
Status: CANCELLED | OUTPATIENT
Start: 2017-09-18

## 2017-09-18 RX ORDER — ASPIRIN 325 MG
325 TABLET ORAL DAILY
Status: CANCELLED | OUTPATIENT
Start: 2017-09-19

## 2017-09-18 RX ORDER — RAMELTEON 8 MG/1
8 TABLET ORAL NIGHTLY PRN
Status: DISCONTINUED | OUTPATIENT
Start: 2017-09-18 | End: 2017-09-25 | Stop reason: HOSPADM

## 2017-09-18 RX ORDER — ASPIRIN 325 MG
325 TABLET ORAL DAILY
Status: DISCONTINUED | OUTPATIENT
Start: 2017-09-19 | End: 2017-09-25 | Stop reason: HOSPADM

## 2017-09-18 RX ORDER — ONDANSETRON 2 MG/ML
4 INJECTION INTRAMUSCULAR; INTRAVENOUS EVERY 6 HOURS PRN
Status: CANCELLED | OUTPATIENT
Start: 2017-09-18

## 2017-09-18 RX ADMIN — HYDRALAZINE HYDROCHLORIDE 50 MG: 50 TABLET ORAL at 05:09

## 2017-09-18 RX ADMIN — CARVEDILOL 25 MG: 25 TABLET, FILM COATED ORAL at 09:09

## 2017-09-18 RX ADMIN — LOSARTAN POTASSIUM 100 MG: 50 TABLET, FILM COATED ORAL at 09:09

## 2017-09-18 RX ADMIN — ASPIRIN 325 MG ORAL TABLET 325 MG: 325 PILL ORAL at 09:09

## 2017-09-18 RX ADMIN — STANDARDIZED SENNA CONCENTRATE AND DOCUSATE SODIUM 1 TABLET: 8.6; 5 TABLET, FILM COATED ORAL at 09:09

## 2017-09-18 RX ADMIN — HYDRALAZINE HYDROCHLORIDE 50 MG: 50 TABLET ORAL at 12:09

## 2017-09-18 RX ADMIN — QUETIAPINE FUMARATE 25 MG: 25 TABLET, FILM COATED ORAL at 09:09

## 2017-09-18 RX ADMIN — HYDRALAZINE HYDROCHLORIDE 50 MG: 50 TABLET ORAL at 09:09

## 2017-09-18 RX ADMIN — LEVOTHYROXINE SODIUM 50 MCG: 50 TABLET ORAL at 05:09

## 2017-09-18 RX ADMIN — POTASSIUM CHLORIDE 10 MEQ: 750 CAPSULE, EXTENDED RELEASE ORAL at 09:09

## 2017-09-18 NOTE — PLAN OF CARE
Problem: Patient Care Overview  Goal: Plan of Care Review  Outcome: Ongoing (interventions implemented as appropriate)  POC reviewed w/ pt and pt's daughter. Both verbalized understanding. VSS during the shift and no signs of distress noted. No acute events overnight. See flowsheets for documented assessment and VS. WCTM.

## 2017-09-18 NOTE — PROGRESS NOTES
Patient verbalize she does not need smoke cessation counseling because does not smoke and she does not drink alcohol also.

## 2017-09-18 NOTE — SIGNIFICANT EVENT
Patient arrived at [facility] with gross hematuria evident in the holley.  This was not present at the hospital per the  who transported her.  Will flush catheter to clear.  Follow H/H and check urinalysis with culture.  Hold lovenox dose today.  Urology follow-up if no improvement.

## 2017-09-18 NOTE — PROGRESS NOTES
Patient admitted via w/c, aaox3, respirations unlabored at room air pulse ox 98%, patient denies pain, holley catherer with bloody urine present and Dr Cohn was informed per charge nurse. Patient instructed on fall precautions,pt's rights, call light, this unit and to call prn to prevent falls or injuries, patient verbalized understanding, pt's daughter at bedside. Pt lying in bed, hob elevated at 45 degree, call light in reach.

## 2017-09-18 NOTE — PT/OT/SLP PROGRESS
"Occupational Therapy  Treatment/Discharge    Blue Cassidy   MRN: [id number]   Admitting Diagnosis: Acute encephalopathy    OT Date of Treatment: 09/18/17   OT Start Time: 1400  OT Stop Time: 1416  OT Total Time (min): 16 min    Billable Minutes:  Therapeutic Activity 16    General Precautions: Standard, aspiration, fall  Orthopedic Precautions: N/A  Braces: N/A    Do you have any cultural, spiritual, Scientologist conflicts, given your current situation?: none stated    Subjective:  Communicated with RN prior to session.    Pain/Comfort  Pain Rating 1: 0/10  Pain Rating Post-Intervention 1: 0/10    Objective:  Patient found with: holley catheter     Functional Mobility:  Pt seen in room for functional transfer training to/from bariatric BSC; pt required Min A to stand from bedside chair and CGA to complete SPT chair->BSC without RW; pt completed SPT from bedside commode back to bedside chair using RW with SBA  **pt educated on safe technique when using RW for functional transfers    AM-PAC 6 CLICK ADL   How much help from another person does this patient currently need?   1 = Unable, Total/Dependent Assistance  2 = A lot, Maximum/Moderate Assistance  3 = A little, Minimum/Contact Guard/Supervision  4 = None, Modified [place]/Independent    Putting on and taking off regular lower body clothing? : 2  Bathing (including washing, rinsing, drying)?: 2  Toileting, which includes using toilet, bedpan, or urinal? : 2  Putting on and taking off regular upper body clothing?: 3  Taking care of personal grooming such as brushing teeth?: 3  Eating meals?: 3  Total Score: 15     AM-PAC Raw Score CMS "G-Code Modifier Level of Impairment Assistance   6 % Total / Unable   7 - 8 CM 80 - 100% Maximal Assist   9-13 CL 60 - 80% Moderate Assist   14 - 19 CK 40 - 60% Moderate Assist   20 - 22 CJ 20 - 40% Minimal Assist   23 CI 1-20% SBA / CGA   24 CH 0% Independent/ Mod I       Patient left up in chair with all lines intact and " call button in reach    ASSESSMENT:  Blue Cassidy is a 71 y.o. female with a medical diagnosis of Acute encephalopathy and presents with improving confidence, safety awareness, and overall independence with functional transfers.  Pt making good progress towards goals but is not at PLOF.  Pt d/c'd to SNF today.    Rehab identified problem list/impairments: Rehab identified problem list/impairments: weakness, impaired endurance, impaired self care skills, impaired functional mobilty, gait instability, impaired balance, decreased safety awareness    Rehab potential is good.    Activity tolerance: Fair    Discharge recommendations: Discharge Facility/Level Of Care Needs: nursing facility, skilled     Barriers to discharge: Barriers to Discharge: Decreased caregiver support    Equipment recommendations:       GOALS:    Occupational Therapy Goals        Problem: Occupational Therapy Goal    Goal Priority Disciplines Outcome Interventions   Occupational Therapy Goal     OT, PT/OT Ongoing (interventions implemented as appropriate)    Description:  Goals to be met by: 9/25/17     Patient will increase functional independence with ADLs by performing:    Feeding with Set-up Assistance in upright position with little to no spilling.  UE Dressing with Set-up Assistance and Stand-by Assistance.  LE Dressing with Set-up Assistance and Moderate Assistance.  Grooming while standing with Contact Guard Assistance.  Toileting from bedside commode with Moderate Assistance for hygiene and clothing management.   Supine to sit with Minimal Assistance. MET 9/12  Stand pivot transfers with Contact Guard Assistance. Met 9/18  Toilet transfer to bedside commode with Contact Guard Assistance. Met 9/18                        Plan:  Patient to be seen 3 x/week to address the above listed problems via self-care/home management, therapeutic activities, therapeutic exercises  Plan of Care expires: 10/09/17  Plan of Care reviewed with: patient,  daughter         Attila Chin, LOTR  09/18/2017

## 2017-09-18 NOTE — PLAN OF CARE
Pt has been accepted to [facility] today. Informed patient. W/c van set up with [employer] for transportation. Provided nurse with phone number to call report.

## 2017-09-18 NOTE — PLAN OF CARE
Problem: Occupational Therapy Goal  Goal: Occupational Therapy Goal  Goals to be met by: 9/25/17     Patient will increase functional independence with ADLs by performing:    Feeding with Set-up Assistance in upright position with little to no spilling.  UE Dressing with Set-up Assistance and Stand-by Assistance.  LE Dressing with Set-up Assistance and Moderate Assistance.  Grooming while standing with Contact Guard Assistance.  Toileting from bedside commode with Moderate Assistance for hygiene and clothing management.   Supine to sit with Minimal Assistance. MET 9/12  Stand pivot transfers with Contact Guard Assistance. Met 9/18  Toilet transfer to bedside commode with Contact Guard Assistance. Met 9/18      Outcome: Ongoing (interventions implemented as appropriate)  Pt progressing.  Met two goals today.  KAUR Padilla  9/18/2017

## 2017-09-18 NOTE — PROGRESS NOTES
Spoke with Dr. Maya on call with [facility] in [facility], regarding pt HR 55 this morning. Pt due for carvedilol. SBP >180. MD verbalized okay to administer. Also discussed removing holley catheter, however pt failed previous attempt. MD verbalized to keep in place. Pt to potentially transfer to SNF this afternoon. SONIA Giang also with MD and verbalized that she is working on this at the moment. Will continue to closely monitor pt,.

## 2017-09-18 NOTE — PROGRESS NOTES
Patient admitted with moderate bloody urine draining to holley catherer. This nurse informed dr. Cohn that holley catherer was flushed with sterile normal saline as ordered and now urine is clear yellow with slight sediment present. Pt lying in bed quietly ndn. Call light in reach.

## 2017-09-18 NOTE — PROGRESS NOTES
Pt d/c'd per MD orders. MEd req complete per MD. IV removed without complications, catheter tip intact, pressure applied. Jones left in place, secured to upper thigh. Report called to WENDY Noe at [facility]. B/P manually 140s/70s. Pt in no acute distress. Belongings gathered. Wheelchair van arrived. Daughter to transport with pt.

## 2017-09-18 NOTE — PLAN OF CARE
Discharge planning: Message sent to Lyla at [facility] to inform that patient is medically ready for d/c to snf today.

## 2017-09-19 ENCOUNTER — PATIENT OUTREACH (OUTPATIENT)
Dept: ADMINISTRATIVE | Facility: CLINIC | Age: 72
End: 2017-09-19

## 2017-09-19 PROBLEM — R53.81 DEBILITY: Status: ACTIVE | Noted: 2017-09-19

## 2017-09-19 PROBLEM — I63.9 CEREBROVASCULAR ACCIDENT (CVA): Status: ACTIVE | Noted: 2017-09-04

## 2017-09-19 PROBLEM — J44.9 CHRONIC OBSTRUCTIVE PULMONARY DISEASE: Status: ACTIVE | Noted: 2017-09-19

## 2017-09-19 PROBLEM — E03.9 ACQUIRED HYPOTHYROIDISM: Status: ACTIVE | Noted: 2017-09-19

## 2017-09-19 LAB
ANION GAP SERPL CALC-SCNC: 7 MMOL/L
BUN SERPL-MCNC: 13 MG/DL
CALCIUM SERPL-MCNC: 8.9 MG/DL
CHLORIDE SERPL-SCNC: 103 MMOL/L
CO2 SERPL-SCNC: 34 MMOL/L
CREAT SERPL-MCNC: 0.9 MG/DL
EST. GFR  (AFRICAN AMERICAN): >60 ML/MIN/1.73 M^2
EST. GFR  (NON AFRICAN AMERICAN): >60 ML/MIN/1.73 M^2
GLUCOSE SERPL-MCNC: 138 MG/DL
HCT VFR BLD AUTO: 39.3 %
HGB BLD-MCNC: 11.8 G/DL
PHOSPHATE SERPL-MCNC: 3.3 MG/DL
POCT GLUCOSE: 146 MG/DL (ref 70–110)
POCT GLUCOSE: 158 MG/DL (ref 70–110)
POCT GLUCOSE: 170 MG/DL (ref 70–110)
POCT GLUCOSE: 179 MG/DL (ref 70–110)
POTASSIUM SERPL-SCNC: 3.8 MMOL/L
SODIUM SERPL-SCNC: 144 MMOL/L
VDRL CSF QL: NORMAL

## 2017-09-19 PROCEDURE — 97530 THERAPEUTIC ACTIVITIES: CPT

## 2017-09-19 PROCEDURE — 25000003 PHARM REV CODE 250: Performed by: INTERNAL MEDICINE

## 2017-09-19 PROCEDURE — 12000000 HC SNF SEMI-PRIVATE ROOM

## 2017-09-19 PROCEDURE — 36415 COLL VENOUS BLD VENIPUNCTURE: CPT

## 2017-09-19 PROCEDURE — 80048 BASIC METABOLIC PNL TOTAL CA: CPT

## 2017-09-19 PROCEDURE — 97110 THERAPEUTIC EXERCISES: CPT

## 2017-09-19 PROCEDURE — 97116 GAIT TRAINING THERAPY: CPT

## 2017-09-19 PROCEDURE — 97166 OT EVAL MOD COMPLEX 45 MIN: CPT

## 2017-09-19 PROCEDURE — 85014 HEMATOCRIT: CPT

## 2017-09-19 PROCEDURE — 97161 PT EVAL LOW COMPLEX 20 MIN: CPT

## 2017-09-19 PROCEDURE — 85018 HEMOGLOBIN: CPT

## 2017-09-19 PROCEDURE — 25000003 PHARM REV CODE 250: Performed by: HOSPITALIST

## 2017-09-19 PROCEDURE — 63600175 PHARM REV CODE 636 W HCPCS: Performed by: INTERNAL MEDICINE

## 2017-09-19 PROCEDURE — 84100 ASSAY OF PHOSPHORUS: CPT

## 2017-09-19 PROCEDURE — 99306 1ST NF CARE HIGH MDM 50: CPT | Mod: ,,, | Performed by: HOSPITALIST

## 2017-09-19 PROCEDURE — 97802 MEDICAL NUTRITION INDIV IN: CPT

## 2017-09-19 PROCEDURE — 97535 SELF CARE MNGMENT TRAINING: CPT

## 2017-09-19 RX ORDER — FUROSEMIDE 40 MG/1
40 TABLET ORAL 2 TIMES DAILY
Status: DISCONTINUED | OUTPATIENT
Start: 2017-09-19 | End: 2017-09-25 | Stop reason: HOSPADM

## 2017-09-19 RX ORDER — CARVEDILOL 25 MG/1
25 TABLET ORAL 2 TIMES DAILY WITH MEALS
COMMUNITY

## 2017-09-19 RX ADMIN — LOSARTAN POTASSIUM 100 MG: 50 TABLET, FILM COATED ORAL at 08:09

## 2017-09-19 RX ADMIN — HYDRALAZINE HYDROCHLORIDE 50 MG: 50 TABLET ORAL at 09:09

## 2017-09-19 RX ADMIN — HYDRALAZINE HYDROCHLORIDE 50 MG: 50 TABLET ORAL at 05:09

## 2017-09-19 RX ADMIN — CARVEDILOL 25 MG: 25 TABLET, FILM COATED ORAL at 08:09

## 2017-09-19 RX ADMIN — ENOXAPARIN SODIUM 40 MG: 100 INJECTION SUBCUTANEOUS at 05:09

## 2017-09-19 RX ADMIN — ASPIRIN 325 MG ORAL TABLET 325 MG: 325 PILL ORAL at 08:09

## 2017-09-19 RX ADMIN — HYDRALAZINE HYDROCHLORIDE 50 MG: 50 TABLET ORAL at 02:09

## 2017-09-19 RX ADMIN — LEVOTHYROXINE SODIUM 50 MCG: 50 TABLET ORAL at 05:09

## 2017-09-19 RX ADMIN — STANDARDIZED SENNA CONCENTRATE AND DOCUSATE SODIUM 1 TABLET: 8.6; 5 TABLET, FILM COATED ORAL at 09:09

## 2017-09-19 RX ADMIN — HYDRALAZINE HYDROCHLORIDE 25 MG: 25 TABLET ORAL at 07:09

## 2017-09-19 RX ADMIN — QUETIAPINE FUMARATE 25 MG: 25 TABLET, FILM COATED ORAL at 09:09

## 2017-09-19 RX ADMIN — POTASSIUM CHLORIDE 10 MEQ: 750 CAPSULE, EXTENDED RELEASE ORAL at 08:09

## 2017-09-19 RX ADMIN — FUROSEMIDE 40 MG: 40 TABLET ORAL at 09:09

## 2017-09-19 NOTE — PT/OT/SLP DISCHARGE
Physical Therapy Discharge Summary    Blue Cassidy  MRN: [id number]   Acute encephalopathy   Patient Discharged from acute Physical Therapy on 17.  Please refer to prior PT noted date on 17 for functional status.     Assessment:   Goals partially met.  GOALS:    Physical Therapy Goals        Problem: Physical Therapy Goal    Goal Priority Disciplines Outcome Goal Variances Interventions   Physical Therapy Goal     PT/OT, PT Ongoing (interventions implemented as appropriate)     Description:  Goals to be met by: 2017    Patient will increase functional independence with mobility by performin. Supine to sit with MInimal Assistance - Met with HOB elevated  2. Sit to supine with MInimal Assistance  3. Rolling to Left and Right with Minimal Assistance.- Met  4. Sit to stand transfer with Contact Guard Assistance - not met  5. Bed to chair transfer with Contact Guard Assistance using Rolling Walker - not met  6. Gait  x 25 feet with Contact Guard Assistance using Rolling Walker. -  Not met                     Reasons for Discontinuation of Therapy Services  Transfer to alternate level of care.      Plan:  Patient Discharged to: Skilled Nursing Facility   Kenisha Lozada, PT  .

## 2017-09-19 NOTE — H&P
[facility]  Department of Hospital Medicine  History & Physical    Patient Name: Blue Cassidy  MRN: [id number]  Code Status: Full Code  Admission Date: 9/18/2017  Attending Physician: Lindsey Rodriguez MD   Primary Care Provider: Primary Doctor No    Subjective:     Principal Problem:Debility    No chief complaint on file.       HPI: Chief Complaint/Reason for Admission: Debility    History of Present Illness:  Patient is a 71 y.o. female who has a past medical history of CHF; COPD; CVA; Diabetes; GERD; Gout; Hypertension; Thyroid disease presented after being transferred from [facility] with acute altered mental status with agitation. After extensive workup up etiology likely was likely acute encephalopathy from viral encephalitis. This likely worsened her dementia due to old strokes. Hospital course was prolonged and complicated (all acute care notes have been reviewed in detail). Of note, she developed acute urinary retention and holley placed on 9/10. She arrived to SNF last night with hematuria that improved after being flushed with sterile normal saline and now urine that is clear yellow with slight sediment present. Patient has been working with PT/OT who recommend SNF for further balance/mobility training. Patient lives at home in [place] with her daughter and granddaughter. There is no family currently at bedside. She is tolerating diet without complaints and her last BM was this morning. Patient currently denies any fever/chills, chest pain, dyspnea, weakness, numbness, abdominal pain, nausea/vomiting, dysuria/hematuria, or weight loss.         Past Medical History:   Diagnosis Date    CHF (congestive heart failure)     COPD (chronic obstructive pulmonary disease)     CVA (cerebral vascular accident)     Diabetes     GERD (gastroesophageal reflux disease)     Gout     Hypertension     Thyroid disease     UTI (urinary tract infection)        Past Surgical  History:   Procedure Laterality Date    HYSTERECTOMY      JOINT REPLACEMENT      right knee    [place] REMOVAL GALLBLADDER      Cholecystectomy    THYROID SURGERY         Review of patient's allergies indicates:  No Known Allergies    Current Facility-Administered Medications on File Prior to Encounter   Medication    [DISCONTINUED] acetaminophen tablet 650 mg    [DISCONTINUED] albuterol-ipratropium 2.5mg-0.5mg/3mL nebulizer solution 3 mL    [DISCONTINUED] aspirin tablet 325 mg    [DISCONTINUED] bisacodyl EC tablet 5 mg    [DISCONTINUED] carvedilol tablet 25 mg    [DISCONTINUED] dextrose 50% injection 12.5 g    [DISCONTINUED] enoxaparin injection 40 mg    [DISCONTINUED] glucagon (human recombinant) injection 1 mg    [DISCONTINUED] hydrALAZINE tablet 25 mg    [DISCONTINUED] hydrALAZINE tablet 50 mg    [DISCONTINUED] insulin aspart pen 0-5 Units    [DISCONTINUED] levothyroxine tablet 50 mcg    [DISCONTINUED] lorazepam injection 2 mg    [DISCONTINUED] losartan tablet 100 mg    [DISCONTINUED] midazolam injection 1 mg    [DISCONTINUED] midazolam injection 1 mg    [DISCONTINUED] ondansetron injection 4 mg    [DISCONTINUED] potassium chloride CR capsule 10 mEq    [DISCONTINUED] quetiapine tablet 25 mg     Current Outpatient Prescriptions on File Prior to Encounter   Medication Sig    albuterol (VENTOLIN HFA) 90 mcg/actuation inhaler Inhale 2 puffs into the lungs every 6 (six) hours as needed for Wheezing. Rescue    allopurinol (ZYLOPRIM) 100 MG tablet Take 100 mg by mouth 3 (three) times daily.    baclofen (LIORESAL) 10 MG tablet Take 10 mg by mouth 3 (three) times daily.    furosemide (LASIX) 80 MG tablet Take 80 mg by mouth 2 (two) times daily.    glipiZIDE (GLUCOTROL) 5 MG TR24 Take 5 mg by mouth daily with breakfast.    levothyroxine (SYNTHROID) 100 MCG tablet Take 100 mcg by mouth once daily.    losartan (COZAAR) 100 MG tablet Take 100 mg by mouth once daily.    omeprazole (PRILOSEC) 20 MG  capsule Take 20 mg by mouth once daily.    potassium chloride SA (K-DUR,KLOR-CON) 10 MEQ tablet Take 10 mEq by mouth once daily.    CARVEDILOL PHOSPHATE 20 MG ORAL CM24 (COREG CR) 20 mg 24 hr capsule Take 20 mg by mouth 2 (two) times daily.     Family History     None        Social History Main Topics    Smoking status: Former Smoker     Years: 15.00     Types: Cigarettes    Smokeless tobacco: Former User      Comment: greater than 18 years    Alcohol use No    Drug use: No    Sexual activity: Not Currently     Review of Systems   Constitutional: Negative for chills, fatigue and fever.   HENT: Negative for congestion, facial swelling, hearing loss and trouble swallowing.    Eyes: Negative for photophobia and visual disturbance.   Respiratory: Negative for chest tightness, shortness of breath and wheezing.    Cardiovascular: Negative for chest pain, palpitations and leg swelling.   Gastrointestinal: Negative for abdominal pain, blood in stool, constipation, diarrhea, nausea and vomiting.   Endocrine: Negative.    Genitourinary: Negative.    Musculoskeletal: Negative for back pain, joint swelling and myalgias.   Skin: Negative.    Allergic/Immunologic: Negative.    Neurological: Negative for dizziness, facial asymmetry, speech difficulty, weakness and numbness.   Hematological: Negative.    Psychiatric/Behavioral: Negative for agitation, confusion and dysphoric mood. The patient is not nervous/anxious.      Objective:     Vital Signs (Most Recent):  Temp: 97.3 °F (36.3 °C) (09/18/17 2030)  Pulse: (!) 58 (09/19/17 0845)  Resp: 18 (09/18/17 2030)  BP: (!) 144/60 (09/19/17 0845)  SpO2: 97 % (09/18/17 2030) Vital Signs (24h Range):  Temp:  [97.3 °F (36.3 °C)-98.5 °F (36.9 °C)] 97.3 °F (36.3 °C)  Pulse:  [55-62] 58  Resp:  [18] 18  SpO2:  [97 %-98 %] 97 %  BP: (144-187)/(60-84) 144/60     Weight: (!) 140.6 kg (309 lb 15.5 oz)  Body mass index is 53.21 kg/m².    Physical Exam   Constitutional: She is oriented to  person, place, and time. Vital signs are normal. She appears well-developed and well-nourished.  Non-toxic appearance. She does not appear ill. No distress.   Morbidly obese    HENT:   Head: Normocephalic and atraumatic.   Eyes: Conjunctivae and EOM are normal. Pupils are equal, round, and reactive to light. No scleral icterus.   Neck: Normal range of motion and full passive range of motion without pain. Neck supple. No JVD present. Carotid bruit is not present. No thyromegaly present.   Cardiovascular: Normal rate, regular rhythm, S1 normal, S2 normal, normal heart sounds and normal pulses.  Exam reveals no gallop, no S3, no S4 and no friction rub.    No murmur heard.  + LE edema B/L   Pulmonary/Chest: Effort normal and breath sounds normal. No accessory muscle usage. No tachypnea. No respiratory distress. She has no decreased breath sounds. She has no wheezes. She has no rhonchi. She has no rales.   Abdominal: Soft. Normal appearance and bowel sounds are normal. She exhibits no shifting dullness, no distension, no abdominal bruit and no ascites. There is no hepatosplenomegaly. There is no tenderness. There is no rigidity and no guarding.   Genitourinary:   Genitourinary Comments: + holley in place    Musculoskeletal: Normal range of motion. She exhibits no edema or tenderness.   Neurological: She is alert and oriented to person, place, and time. She has normal strength. She is not disoriented. No cranial nerve deficit or sensory deficit. GCS eye subscore is 4. GCS verbal subscore is 5. GCS motor subscore is 6.   Skin: Skin is warm, dry and intact. No abrasion and no lesion noted.   Psychiatric: She has a normal mood and affect. Her behavior is normal. Judgment and thought content normal. Her mood appears not anxious. Her speech is not slurred. She is not actively hallucinating. Cognition and memory are normal. She does not exhibit a depressed mood. She is attentive.       Significant Labs: All pertinent labs within  the past 24 hours have been reviewed.    Significant Imaging: I have reviewed all pertinent imaging results/findings within the past 24 hours.    Assessment/Plan:     * Debility    Cont with PT/OT for gait training and strengthening and restoration of ADL's   Cont DVT prophylaxis with Lovenox             Chronic obstructive pulmonary disease    No active wheezes  Duonebs and oxygen as needed        Cerebrovascular accident (CVA)    Cont  mg to treat per neurology  PT/OT as above.         Acquired hypothyroidism    Cont synthroid to treat.         Acute urinary retention    S/p holley placement  Cont holley care  Follow up with urology after discharge from SNF.         Hypokalemia    Cont potassium to treat  Monitor labs with biweekly lab draws.         CHF (congestive heart failure)    Maintain euvolemia by monitoring I/O's and daily weights.  Fluid restriction (2 liters/24 hours)  Low Na diet  Cont ARB, BB.   Lasix has been held since admission. Cont to monitor and restart when appropriate.           HTN (hypertension), malignant    Cont hydralazine, losartan and coreg to treat.   Poorly controlled  Cont to monitor.         Type 2 diabetes mellitus with complication    Cont ADA diet  Cont BG checks  SSI         Acute encephalopathy    Etiology unclear but likely CNS infection.   Continues with evidence of mild encephalopathy which fluctuates but has improved per previous reports.  Keppra has been discontinued by neurology   Cont Seroquel as needed for acute agitation   Delirium precautions   Follow up with neurology as outpatient after discharge from SNF.                   Lindsey Richard MD  Department of Hospital Medicine  [facility]

## 2017-09-19 NOTE — ASSESSMENT & PLAN NOTE
Etiology unclear but likely CNS infection.   Continues with evidence of mild encephalopathy which fluctuates but has improved per previous reports.  Keppra has been discontinued by neurology   Cont Seroquel as needed for acute agitation   Delirium precautions   Follow up with neurology as outpatient after discharge from SNF.

## 2017-09-19 NOTE — SUBJECTIVE & OBJECTIVE
Past Medical History:   Diagnosis Date    CHF (congestive heart failure)     COPD (chronic obstructive pulmonary disease)     CVA (cerebral vascular accident)     Diabetes     GERD (gastroesophageal reflux disease)     Gout     Hypertension     Thyroid disease     UTI (urinary tract infection)        Past Surgical History:   Procedure Laterality Date    HYSTERECTOMY      JOINT REPLACEMENT      right knee    [place] REMOVAL GALLBLADDER      Cholecystectomy    THYROID SURGERY         Review of patient's allergies indicates:  No Known Allergies    Current Facility-Administered Medications on File Prior to Encounter   Medication    [DISCONTINUED] acetaminophen tablet 650 mg    [DISCONTINUED] albuterol-ipratropium 2.5mg-0.5mg/3mL nebulizer solution 3 mL    [DISCONTINUED] aspirin tablet 325 mg    [DISCONTINUED] bisacodyl EC tablet 5 mg    [DISCONTINUED] carvedilol tablet 25 mg    [DISCONTINUED] dextrose 50% injection 12.5 g    [DISCONTINUED] enoxaparin injection 40 mg    [DISCONTINUED] glucagon (human recombinant) injection 1 mg    [DISCONTINUED] hydrALAZINE tablet 25 mg    [DISCONTINUED] hydrALAZINE tablet 50 mg    [DISCONTINUED] insulin aspart pen 0-5 Units    [DISCONTINUED] levothyroxine tablet 50 mcg    [DISCONTINUED] lorazepam injection 2 mg    [DISCONTINUED] losartan tablet 100 mg    [DISCONTINUED] midazolam injection 1 mg    [DISCONTINUED] midazolam injection 1 mg    [DISCONTINUED] ondansetron injection 4 mg    [DISCONTINUED] potassium chloride CR capsule 10 mEq    [DISCONTINUED] quetiapine tablet 25 mg     Current Outpatient Prescriptions on File Prior to Encounter   Medication Sig    albuterol (VENTOLIN HFA) 90 mcg/actuation inhaler Inhale 2 puffs into the lungs every 6 (six) hours as needed for Wheezing. Rescue    allopurinol (ZYLOPRIM) 100 MG tablet Take 100 mg by mouth 3 (three) times daily.    baclofen (LIORESAL) 10 MG tablet Take 10 mg by mouth 3 (three) times daily.     furosemide (LASIX) 80 MG tablet Take 80 mg by mouth 2 (two) times daily.    glipiZIDE (GLUCOTROL) 5 MG TR24 Take 5 mg by mouth daily with breakfast.    levothyroxine (SYNTHROID) 100 MCG tablet Take 100 mcg by mouth once daily.    losartan (COZAAR) 100 MG tablet Take 100 mg by mouth once daily.    omeprazole (PRILOSEC) 20 MG capsule Take 20 mg by mouth once daily.    potassium chloride SA (K-DUR,KLOR-CON) 10 MEQ tablet Take 10 mEq by mouth once daily.    CARVEDILOL PHOSPHATE 20 MG ORAL CM24 (COREG CR) 20 mg 24 hr capsule Take 20 mg by mouth 2 (two) times daily.     Family History     None        Social History Main Topics    Smoking status: Former Smoker     Years: 15.00     Types: Cigarettes    Smokeless tobacco: Former User      Comment: greater than 18 years    Alcohol use No    Drug use: No    Sexual activity: Not Currently     Review of Systems   Constitutional: Negative for chills, fatigue and fever.   HENT: Negative for congestion, facial swelling, hearing loss and trouble swallowing.    Eyes: Negative for photophobia and visual disturbance.   Respiratory: Negative for chest tightness, shortness of breath and wheezing.    Cardiovascular: Negative for chest pain, palpitations and leg swelling.   Gastrointestinal: Negative for abdominal pain, blood in stool, constipation, diarrhea, nausea and vomiting.   Endocrine: Negative.    Genitourinary: Negative.    Musculoskeletal: Negative for back pain, joint swelling and myalgias.   Skin: Negative.    Allergic/Immunologic: Negative.    Neurological: Negative for dizziness, facial asymmetry, speech difficulty, weakness and numbness.   Hematological: Negative.    Psychiatric/Behavioral: Negative for agitation, confusion and dysphoric mood. The patient is not nervous/anxious.      Objective:     Vital Signs (Most Recent):  Temp: 97.3 °F (36.3 °C) (09/18/17 2030)  Pulse: (!) 58 (09/19/17 0845)  Resp: 18 (09/18/17 2030)  BP: (!) 144/60 (09/19/17 0845)  SpO2: 97 %  (09/18/17 2030) Vital Signs (24h Range):  Temp:  [97.3 °F (36.3 °C)-98.5 °F (36.9 °C)] 97.3 °F (36.3 °C)  Pulse:  [55-62] 58  Resp:  [18] 18  SpO2:  [97 %-98 %] 97 %  BP: (144-187)/(60-84) 144/60     Weight: (!) 140.6 kg (309 lb 15.5 oz)  Body mass index is 53.21 kg/m².    Physical Exam   Constitutional: She is oriented to person, place, and time. Vital signs are normal. She appears well-developed and well-nourished.  Non-toxic appearance. She does not appear ill. No distress.   Morbidly obese    HENT:   Head: Normocephalic and atraumatic.   Eyes: Conjunctivae and EOM are normal. Pupils are equal, round, and reactive to light. No scleral icterus.   Neck: Normal range of motion and full passive range of motion without pain. Neck supple. No JVD present. Carotid bruit is not present. No thyromegaly present.   Cardiovascular: Normal rate, regular rhythm, S1 normal, S2 normal, normal heart sounds and normal pulses.  Exam reveals no gallop, no S3, no S4 and no friction rub.    No murmur heard.  + LE edema B/L   Pulmonary/Chest: Effort normal and breath sounds normal. No accessory muscle usage. No tachypnea. No respiratory distress. She has no decreased breath sounds. She has no wheezes. She has no rhonchi. She has no rales.   Abdominal: Soft. Normal appearance and bowel sounds are normal. She exhibits no shifting dullness, no distension, no abdominal bruit and no ascites. There is no hepatosplenomegaly. There is no tenderness. There is no rigidity and no guarding.   Genitourinary:   Genitourinary Comments: + holley in place    Musculoskeletal: Normal range of motion. She exhibits no edema or tenderness.   Neurological: She is alert and oriented to person, place, and time. She has normal strength. She is not disoriented. No cranial nerve deficit or sensory deficit. GCS eye subscore is 4. GCS verbal subscore is 5. GCS motor subscore is 6.   Skin: Skin is warm, dry and intact. No abrasion and no lesion noted.   Psychiatric:  She has a normal mood and affect. Her behavior is normal. Judgment and thought content normal. Her mood appears not anxious. Her speech is not slurred. She is not actively hallucinating. Cognition and memory are normal. She does not exhibit a depressed mood. She is attentive.       Significant Labs: All pertinent labs within the past 24 hours have been reviewed.    Significant Imaging: I have reviewed all pertinent imaging results/findings within the past 24 hours.

## 2017-09-19 NOTE — ASSESSMENT & PLAN NOTE
Maintain euvolemia by monitoring I/O's and daily weights.  Fluid restriction (2 liters/24 hours)  Low Na diet  Cont ARB, BB.   Lasix has been held since admission. Cont to monitor and restart when appropriate.

## 2017-09-19 NOTE — PT/OT/SLP EVAL
"PhysicalTherapy   Evaluation and treatment    Blue Cassidy   MRN: [id number]     PT Received On: 09/19/17  PT Start Time: 1025     PT Stop Time: 1115    PT Total Time (min): 50 min       Billable Minutes:  Evaluation 1, Gait [id number], Therapeutic Activity 15 and Therapeutic Exercise 8= E + 38    Diagnosis: Debility  Past Medical History:   Diagnosis Date    CHF (congestive heart failure)     COPD (chronic obstructive pulmonary disease)     CVA (cerebral vascular accident)     Diabetes     GERD (gastroesophageal reflux disease)     Gout     Hypertension     Thyroid disease     UTI (urinary tract infection)       Past Surgical History:   Procedure Laterality Date    HYSTERECTOMY      JOINT REPLACEMENT      right knee    [place] REMOVAL GALLBLADDER      Cholecystectomy    THYROID SURGERY           General Precautions: Standard, aspiration, fall  Orthopedic Precautions: N/A   Braces: N/A    Do you have any cultural, spiritual, [employer]aritan conflicts, given your current situation?: none    Patient History:  Lives With: child(cierra), adult (daughter, Lorene, and granddaughter, Jere)  Living Arrangements: house  Home Layout: Able to live on 1st floor  Transportation Available: family or friend will provide  Living Environment Comment: Pt was (I) PTA using a rollator for ambulation, as she would sit when she grew tired.  She was driving. Her daughter and granddaughter live with her.  Daughter is disabled, granddaughter works.  Had a tub-shower, no tub-bench. Has one handicapped toilet.  Equipment Currently Used at Home: bedside commode, rollator  DME owned (not currently used): none    Previous Level of Function:  Ambulation Skills: needs device (Rollator used at home.)  Transfer Skills: independent  ADL Skills: independent  Work/Leisure Activity: independent    Subjective:   Communicated with pt prior to session.  Pt was agreeable to PT services. "I don't think I need to be here very long. I hope I can leave " "by the end of this week."  Chief Complaint: none reported  Patient goals: To go home by the end of the week.    Pain/Comfort  Pain Rating 1: 0/10    Objective:  Patient found seated in her wheelchair with Patient found with: holley catheter    Cognitive Exam:  Oriented to: Person, Place, Time and Situation  Follows Commands/attention: Follows multistep  commands  Communication: clear/fluent  Safety awareness/insight to disability: intact    Physical Exam:  Postural examination/scapula alignment: Rounded shoulder and Head forward    Skin integrity: Visible skin intact  Edema: None noted    Sensation:   Intact    Upper Extremity Range of Motion:  Right Upper Extremity: WNL  Left Upper Extremity: WNL    Upper Extremity Strength:  Right Upper Extremity: WNL  Left Upper Extremity: WNL    Lower Extremity Range of Motion:  Right Lower Extremity: WNL  Left Lower Extremity: WNL    Lower Extremity Strength:  Right Lower Extremity: WNL  Left Lower Extremity: WNL     Fine motor coordination:  Intact    Gross motor coordination: WFL    Functional Status:  MDS G  Transfer Functional Status: CGA-Min (A)  Walk in Room Functional Status: CGA-Min (A)  Walk in Corridor Functional Status: CGA-Min (A)  Locomotion on Unit Functional Status: CGA-Min (A)  Eval Only: Number of L/E limb <4/5 MMT: 0  Moving from seated to standing position: Not steady, but able to stabilize without staff assistance  Walking (with assistive device if used): Not steady, but able to stabilize without staff assistance  Turning around and facing the opposite direction while walking: Not steady, but able to stabilize without staff assistance  Surface-to-surface transfer (transfer between bed and chair or wheelchair): Not steady, but able to stabilize without staff assistance    MDS GG  Sit to Stand Performance: Supervision or touching assistance.  Chair/bed-to-chair transfer Performance: Supervision or touching assistance.  Does the resident walk?: Yes --> Continue " to Walk 50 feet with two turns assessment  Walk 50 feet with two turns Performance: Supervision or touching assistance., See goal below  Walk 50 feet with two turns Goal: Independent.  Walk 150 feet Performance: Not attempted due to medical condition or safety concerns  Does the resident use a wheelchair/scooter?: No --> Skip to Self Care    Transfers:  Sit<>Stand: CGA from wheelchair    Gait:  Ambulated 20, 55 feet with use of a rolling walker and SBA    Timed Up & Go Test (TUG)    Instructions to the patient:   From sitting in a chair, stand up, walk 3 meters, turn around, walk back, and sit down    Scoring:   Assistive Device and/or Bracing Used: Rolling Walker  TUG Time: 63 seconds     Community Dwelling Older Adult = > 13.5 sec. are associated with high fall risk     Additional Treatment:  10 minutes on LBE to improve her endurance and LE strength.    Patient left up in chair with call button in reach.    Assessment:  Blue Cassidy is a 71 y.o. female with a medical diagnosis of Debility , acute encephalopathy and s/p fall.  Ms. Cassidy presents with the following: impaired endurance (as noted with decreased gait distance), falls risk as noted with her TUG score, and decreased gait speed and overall functional mobility.  Her personal factor that will affect her plan of care is possibly being home alone by herself upon discharge at times; however she does live with her daughter and granddaughter, who can assist with her care.  Her clinical presentation at this time is stable, thus classifying her evaluation as low complexity.    Rehab identified problem list/impairments: weakness, impaired endurance, impaired self care skills, impaired functional mobilty, gait instability, impaired balance    Rehab potential is good.    Activity tolerance: good    Discharge recommendations: home health PT     Barriers to discharge: None    Equipment recommendations: bath bench     GOALS:    Physical Therapy Goals         Problem: Physical Therapy Goal    Goal Priority Disciplines Outcome Goal Variances Interventions   Physical Therapy Goal     PT/OT, PT Ongoing (interventions implemented as appropriate)     Description:  Goals to be met by: 6 days    Patient will increase functional independence with mobility by performin. Supine to sit with supervision. Not met  2. Sit to supine with supervision. Not met  3. Sit to stand transfer with Supervision. Not met  4. Bed to chair transfer with Supervision using Rolling Walker and/or rollator. Not met  5. Gait  x 150 feet with Supervision using Rolling Walker and/or rollator. Not met   6. Ascend/descend 4 stairs with bilateral Handrails stand-by assistance. Not met  7. Ascend/Descend 4 inch curb step with Stand-by Assistance using Rolling Walker and/or rollator. Not met  8. Stand for 3 minutes with Stand-by Assistance using Rolling Walker and/or rollator. Not met  9. Lower extremity exercise program x 20 reps per handout, with independence. Not met                      PLAN:    Patient to be seen  (5-6x/wk)  to address the above listed problems via gait training, therapeutic activities, therapeutic exercises, neuromuscular re-education  Plan of Care Expires: 10/19/17    Vivian Enamorado, PT 2017

## 2017-09-19 NOTE — CONSULTS
"  [facility]  Adult Nutrition  Consult Note    SUMMARY     Recommendations    Recommendation/Intervention:   1. Continue current DM 2000 calories, Cardiac, Dental soft per SLP.    2. RD to f/u.     Goals: Pt to meet >/= 75% of EEN.  Nutrition Goal Status: new    Reason for Assessment    Reason for Assessment: physician consult (New admit)  Diagnosis:  (AMS)  Relevent Medical History: CVA, DM, GERD, Gout, HTN     General Information Comments: Met with patient in room just after lunch. Consumed about 75% of lunch.     Nutrition Discharge Planning: Pt to be d/c on a dental soft, cardiac diet.     Nutrition Prescription Ordered    Current Diet Order: Dental soft, cardiac, DM 2000    Evaluation of Received Nutrients/Fluid Intake     % Intake of Estimated Energy Needs: 75 - 100 %  % Meal Intake: 75%     Nutrition Risk Screen     Nutrition Risk Screen: no indicators present    Nutrition/Diet History    Patient Reported Diet/Restrictions/Preferences: general     Food Preferences: No cultural or Oriental orthodox food preferences     Labs/Tests/Procedures/Meds     Pertinent Labs Reviewed: reviewed  Pertinent Labs Comments: Glu 138H  Pertinent Medications Reviewed: reviewed  Pertinent Medications Comments: levothyroxine    Physical Findings    Overall Physical Appearance: obese     Skin: intact    Anthropometrics    Temp: 97.3 °F (36.3 °C)     Height: 5' 4.02" (162.6 cm)  Weight Method: Standard Scale  Weight: (!) 140.6 kg (309 lb 15.5 oz)     Ideal Body Weight (IBW), Female: 120.1 lb     % Ideal Body Weight, Female (lb): 258.09 lb  BMI (Calculated): 53.3  BMI Grade: greater than 40 - morbid obesity    Estimated/Assessed Needs    Weight Used For Calorie Calculations: (!) 140.6 kg (309 lb 15.5 oz)      Energy Calorie Requirements (kcal): 2530 kcal/day (18 kcal/kg)  Energy Need Method: Kcal/kg     RMR (Day-St. Jeor Equation): 1896.25    Weight Used For Protein Calculations: 120 kg (264 lb 8.8 oz) (IBW)  Protein " Requirements: 96 gm/d (0.8 gm/kg)    Fluid Need Method: RDA Method    RDA Method (mL): 2530    CHO Requirement: 316 gm/d     Assessment and Plan    No nutrition diagnosis at this time    Monitor and Evaluation    Food and Nutrient Intake: energy intake, food and beverage intake  Food and Nutrient Adminstration: diet order  Knowledge/Beliefs/Attitudes: food and nutrition knowledge/skill  Physical Activity and Function: nutrition-related ADLs and IADLs  Anthropometric Measurements: weight, weight change  Biochemical Data, Medical Tests and Procedures: electrolyte and renal panel, lipid profile, gastrointestinal profile, glucose/endocrine profile, inflammatory profile  Nutrition-Focused Physical Findings: overall appearance    Nutrition Risk    Level of Risk:  (f/u 1x/weekly)    Nutrition Follow-Up    RD Follow-up?: Yes

## 2017-09-19 NOTE — PLAN OF CARE
Problem: Patient Care Overview  Goal: Plan of Care Review  Recommendations    Recommendation/Intervention:   1. Continue current DM 2000 calories, Cardiac, Dental soft per SLP.    2. RD to f/u.

## 2017-09-19 NOTE — CLINICAL REVIEW
Clinical Pharmacy Chart Review Note    Admit Date: 9/18/2017   LOS: 1 day     Blue Cassidy is a 71 y.o. female admitted to SNF for PT/OT after hospitalization for altered mental status with agitation.     Active Hospital Problems    Diagnosis  POA    *Debility [R53.81]  Yes    Acquired hypothyroidism [E03.9]  Yes    Chronic obstructive pulmonary disease [J44.9]  Yes    Acute urinary retention [R33.8]  Yes    CHF (congestive heart failure) [I50.9]  Yes    Hypokalemia [E87.6]  Yes    Acute encephalopathy [G93.40]  Yes    Type 2 diabetes mellitus with complication [E11.8]  Yes    HTN (hypertension), malignant [I10]  Yes    Cerebrovascular accident (CVA) [I63.9]  Yes      Resolved Hospital Problems    Diagnosis Date Resolved POA   No resolved problems to display.     Review of patient's allergies indicates:  No Known Allergies  Patient Active Problem List    Diagnosis Date Noted    Debility 09/19/2017    Acquired hypothyroidism 09/19/2017    Chronic obstructive pulmonary disease 09/19/2017    Altered mental status 09/18/2017    Acute urinary retention 09/10/2017    Palliative care encounter 09/10/2017    CHF (congestive heart failure) 09/08/2017    Hypokalemia 09/08/2017    Seizure 09/07/2017    Acute encephalopathy 09/07/2017    Type 2 diabetes mellitus with complication 09/07/2017    HTN (hypertension), malignant 09/07/2017    Delirium 09/06/2017    Urinary tract infection without hematuria 09/06/2017    Cerebrovascular accident (CVA) 09/04/2017       Scheduled Meds:    aspirin  325 mg Oral Daily    carvedilol  25 mg Oral BID    enoxaparin  40 mg Subcutaneous Daily    furosemide  40 mg Oral BID    hydrALAZINE  50 mg Oral Q8H    levothyroxine  50 mcg Oral Before breakfast    losartan  100 mg Oral Daily    potassium chloride  10 mEq Oral Daily    senna-docusate 8.6-50 mg  1 tablet Oral BID     Continuous Infusions:    PRN Meds: acetaminophen, acetaminophen, albuterol-ipratropium  2.5mg-0.5mg/3mL, bisacodyl, calcium carbonate, dextrose 50%, glucagon (human recombinant), INV hydrALAZINE, insulin aspart, quetiapine, ramelteon    OBJECTIVE:     Vital Signs (Last 24H)  Temp:  [97.3 °F (36.3 °C)-98.5 °F (36.9 °C)]   Pulse:  [58-62]   Resp:  [18]   BP: (144-187)/(60-84)   SpO2:  [97 %]     Laboratory:  CBC:   Recent Labs  Lab 09/16/17 0705 09/17/17 0514 09/18/17 0412 09/19/17  0605   WBC 4.15 4.34 5.27  --    RBC 4.25 4.13 3.96*  --    HGB 12.0 11.8* 11.3* 11.8*   HCT 38.0 36.1* 34.8* 39.3    201 222  --    MCV 89 87 88  --    MCH 28.2 28.6 28.5  --    MCHC 31.6* 32.7 32.5  --      BMP:   Recent Labs  Lab 09/16/17 0705 09/17/17 0514 09/18/17 0412 09/19/17  0605   * 122* 160* 138*    142 143 144   K 3.7 3.7 3.6 3.8    103 105 103   CO2 33* 28 30* 34*   BUN 13 12 14 13   CREATININE 1.0 0.9 1.0 0.9   CALCIUM 9.1 9.0 8.8 8.9   MG 1.6 1.4* 1.6  --      CMP:   Recent Labs  Lab 09/17/17 0514 09/18/17 0412 09/19/17  0605   * 160* 138*   CALCIUM 9.0 8.8 8.9    143 144   K 3.7 3.6 3.8   CO2 28 30* 34*    105 103   BUN 12 14 13   CREATININE 0.9 1.0 0.9     LFTs: No results for input(s): ALT, AST, ALKPHOS, BILITOT, PROT, ALBUMIN in the last 168 hours.  Coagulation: No results for input(s): INR, APTT in the last 168 hours.    Invalid input(s): PT  Cardiac markers: No results for input(s): CKMB, TROPONINT, MYOGLOBIN in the last 168 hours.  ABGs: No results for input(s): PH, PCO2, PO2, HCO3, POCSATURATED, BE in the last 168 hours.  Microbiology Results (last 7 days)     Procedure Component Value Units Date/Time    Urine culture [[id number]] Collected:  09/18/17 1709    Order Status:  Sent Specimen:  Urine from Urine, Catheterized Updated:  09/18/17 2149        Specimen (12h ago through future)    None          Recent Labs  Lab 09/18/17 1709   COLORU Yellow   SPECGRAV 1.010   PHUR 7.0   PROTEINUA Negative   NITRITE Negative   LEUKOCYTESUR Negative   UROBILINOGEN  Negative     Others: No results for input(s): [id number], TSH, T4FREE, LABLIPI in the last 168 hours.    Invalid input(s): A1C    ASSESSMENT/PLAN:      Debility   --PT/OT  --acetaminophen 650 mg q6h prn mild pain   --bowel regimen for constipation; hold for loose or frequent stools  --DVT prophylaxis: lovenox 40 mg daily; Hgb 11.8       Acquired hypothyroidism   --levothyroxine 50 mcg before breakfast  Lab Results   Component Value Date    TSH 2.344 09/06/2017       Chronic obstructive pulmonary disease   --dueonebs q4h prn wheezing,sob; SpO2 97%, RR 18     Acute urinary retention  --s/p holley placement  Gross hematuria improved after being flushed with sterile normal saline; monitor H/H, UA > 100 RBC     CHF (congestive heart failure)   --furosemide 40 mg BID   --carvedilol 25 mg BID, losartan 100 mg daily   --salt & fluid restriction   Monitor weight, volume status, electrolytes, renal function, BP, HR, EF 55% 9/7/17     Hypokalemia   --potassium chloride CR 10 mEq daily; K+ 3.8     Acute encephalopathy   --likely acute encephalopathy from viral encephalitis  --quetiapine 25 mg qHS prn agitation   --delirium precautions   --f/u with neurology     Type 2 diabetes mellitus with complication   No results found for: LABA1C, HGBA1C ; recommend checking HGBA1C  POCT Glucose   Date Value Ref Range Status   09/19/2017 179 (H) 70 - 110 mg/dL Final   09/19/2017 170 (H) 70 - 110 mg/dL Final   09/19/2017 146 (H) 70 - 110 mg/dL Final   09/18/2017 189 (H) 70 - 110 mg/dL Final   09/18/2017 169 (H) 70 - 110 mg/dL Final   09/18/2017 152 (H) 70 - 110 mg/dL Final   09/18/2017 158 (H) 70 - 110 mg/dL Final   09/17/2017 193 (H) 70 - 110 mg/dL Final   09/17/2017 113 (H) 70 - 110 mg/dL Final   09/17/2017 163 (H) 70 - 110 mg/dL Final   09/17/2017 105 70 - 110 mg/dL Final   09/16/2017 142 (H) 70 - 110 mg/dL Final   09/16/2017 206 (H) 70 - 110 mg/dL Final   --SSI 0-5 units q6h prn BG, ADA diet   Monitor BG as prescribed and adjust  regimen as necessary      HTN (hypertension), malignant   --carvedilol 25 mg BID, hydralazine 50 mg q8h, losartan 100 mg daily, hydralazine 25 mg q6h prn SBP > 180 or DBP > 110  BP: (144-187)/(60-84), HR 58-62     Cerebrovascular accident (CVA)   -- mg daily (antithrombotic)   Lipid panel not on file       Monitor BMP/CBC/Vitals

## 2017-09-19 NOTE — PT/OT/SLP EVAL
"Occupational Therapy  Evaluation & Treatment    Blue Cassidy   MRN: [id number]   Admitting Diagnosis: Debility     OT Date of Treatment: 09/19/17   OT Start Time: 0904  OT Stop Time: 1000  OT Total Time (min): 56 min    Billable Minutes:  Evaluation 15  Self Care/Home Management 30  Therapeutic Activity 11    Diagnosis: Debility    Past Medical History:   Diagnosis Date    CHF (congestive heart failure)     COPD (chronic obstructive pulmonary disease)     CVA (cerebral vascular accident)     Diabetes     GERD (gastroesophageal reflux disease)     Gout     Hypertension     Thyroid disease     UTI (urinary tract infection)       Past Surgical History:   Procedure Laterality Date    HYSTERECTOMY      JOINT REPLACEMENT      right knee    CT REMOVAL GALLBLADDER      Cholecystectomy    THYROID SURGERY           General Precautions: Standard, aspiration, fall  Orthopedic Precautions: N/A  Braces: N/A          Patient History:  Lives With: child(cierra), adult  Living Arrangements: house  Home Accessibility:  ([facility] - no steps - tub/shower combo with GB)  Transportation Available: family or friend will provide  Living Environment Comment:  (Pt lives with adult daughter in [facility] and 0 EDNA to enter - PLOF was Mod I and pt used rollator for mobility - pt stated she stopped getting into tub due to difficulty and mainy bathes sitting on edge of tub)  Equipment Currently Used at Home: grab bar, raised toilet, rollator    Prior level of function:   Bed Mobility/Transfers: needs device  Grooming: independent  Bathing: independent  Upper Body Dressing: independent  Lower Body Dressing: independent  Toileting: needs device  Driving License: Yes  Mode of Transportation: Car, Family  Leisure and Hobbies:  (playing bingo)     Dominant hand: right    Subjective:  Communicated with nurse prior to session.  "That's because I wasn't feeling good then.  I'm feeling better."   Chief Complaint: tired  Patient/Family stated goals: " return home    Pain/Comfort  Pain Rating 1: 0/10    Objective:   Patient found with: holley catheter (supine in bed)    Cognitive Exam:  Oriented to: Person, Place, Time and Situation  Follows Commands/attention: Easily distracted and Follows two-step commands  Communication: clear/fluent  Memory:  No Deficits noted  Safety awareness/insight to disability: impaired  Coping skills/emotional control: Appropriate to situation    Visual/perceptual:  Intact    Physical Exam:  Postural examination/scapula alignment: Head forward  Skin integrity: Visible skin intact  Edema: Mild feet      Sensation:   Intact    Upper Extremity Range of Motion:  Right Upper Extremity: WFL  Left Upper Extremity: WFL    Upper Extremity Strength:  Right Upper Extremity: WFL  Left Upper Extremity: WFL   Strength: good    Fine motor coordination:   Intact    Gross motor coordination: WFL    Functional Status:  MDS G  Bed Mobility Functional Status: CGA-Min (A)  Bed Mobility Level of (A):  (CGA for supine to sit EOB)    Transfer Functional Status: CGA-Min (A)  Transfer Level of (A):  (Min A for sit to stand with RW from bed)    Walk in Room Functional Status: CGA-Min (A)  Walk In Room Level of (A):  (CGA with RW to take steps from bed to W/C -only ~6 steps)    Dressing Functional Status: 3:mod(A)-Max(A)  Dressing Level of (A) :  (UE = set up A; LE = Mod A - needed A to insert L LE into pants with catheter bag and to complete donning socks - pt able to pull socks over toes, but needed assist to pull over heels)    Eating Functional Status: S-SBA  Eating Level of (A) :  (set up A)    Toilet Use Functional Status: total(A)  Toilet Use Level of (A) :  (total A managing catheter - not observed toileting with BM)    Personal Hygiene Functional Status: S-SBA  Personal Hygiene Level of (A) :  (set up A to wash face sitting EOB)    Bathing Functional Status: mod(A)-Max(A)  Bathing Level of (A) :  (Mod A - bathed 7/10 body parts - needed assist to wash  B feet and buttocks)    Eval Only: Number of U/E limb <4/5 MMT: 0  Moving from seated to standing position: Not steady, only able to stabilize with staff assistance  Walking (with assistive device if used): Not steady, only able to stabilize with staff assistance  Turning around and facing the opposite direction while walking: Not steady, only able to stabilize with staff assistance  Moving on and off the toilet: Not steady, only able to stabilize with staff assistance  Surface-to-surface transfer (transfer between bed and chair or wheelchair): Not steady, only able to stabilize with staff assistance    Additional Treatment:  · Pt completed ADLs and func mobility activities for tx session as noted above  · Pt educated on safety during ADLs and DME needs  · Pt educated on role of OT and POC      Patient left up in chair with call button in reach    Assessment:  Blue Cassidy is a 71 y.o. female with a medical diagnosis of Debility.  Pt was agreeable to OT evaluation.  Goals established to assist pt with returning to PLOF regarding ADLs and func mobility.  Pt's main barrier towards independence is decreased strength/endurance.  Pt will benefit from skilled OT services in order to increase her level of safety and independence with ADLs and mobility.  At this time, OT recommends HHOT to ensure safety and independence in home setting after discharge from SNF.  DME recommendations at this time is for a bath bench.     Pt evaluation falls under moderate complexity for evaluation coding due to identification of 3-5 performance deficits noted as stated above. Eval required Min/Mod assistance to complete on this date and detailed assessment(s) were utilized. Moreover, an expanded review of history and occupational profile obtained with additional review of cognitive, physical and psychosocial hx.       Rehab identified problem list/impairments: weakness, impaired endurance, impaired self care skills, impaired functional  mobilty, gait instability, impaired balance, decreased coordination, decreased lower extremity function, decreased safety awareness, impaired coordination, edema    Rehab potential is good    Activity tolerance: Fair    Discharge recommendations: home health OT     Barriers to discharge: None     Equipment recommendations: bath bench     GOALS:    Occupational Therapy Goals        Problem: Occupational Therapy Goal    Goal Priority Disciplines Outcome Interventions   Occupational Therapy Goal     OT, PT/OT Ongoing (interventions implemented as appropriate)    Description:  Goals to be met by: 7 days     Patient will increase functional independence with ADLs by performing:    Feeding with Modified [place].  UE Dressing with Modified [place].  LE Dressing with Set-up Assistance and Supervision using AD.  Grooming while standing with Modified [place].  Toileting from bedside commode with Supervision for hygiene and clothing management.   Bathing from  shower chair/bench with Supervision.  Toilet transfer to bedside commode with Stand-by Assistance.  Upper extremity exercise program x12 reps per handout, with supervision.                      PLAN: Patient to be seen  (5-6x/week) to address the above listed problems via self-care/home management, therapeutic activities, therapeutic exercises  Plan of Care expires: 10/19/17  Plan of Care reviewed with: patient    Nory WHITLEY Ligia, OT  09/19/2017

## 2017-09-19 NOTE — PLAN OF CARE
Problem: Physical Therapy Goal  Goal: Physical Therapy Goal  Goals to be met by: 6 days    Patient will increase functional independence with mobility by performin. Supine to sit with supervision. Not met  2. Sit to supine with supervision. Not met  3. Sit to stand transfer with Supervision. Not met  4. Bed to chair transfer with Supervision using Rolling Walker and/or rollator. Not met  5. Gait  x 150 feet with Supervision using Rolling Walker and/or rollator. Not met   6. Ascend/descend 4 stairs with bilateral Handrails stand-by assistance. Not met  7. Ascend/Descend 4 inch curb step with Stand-by Assistance using Rolling Walker and/or rollator. Not met  8. Stand for 3 minutes with Stand-by Assistance using Rolling Walker and/or rollator. Not met  9. Lower extremity exercise program x 20 reps per handout, with independence. Not met    Outcome: Ongoing (interventions implemented as appropriate)  Goals set today.

## 2017-09-19 NOTE — PLAN OF CARE
Problem: Occupational Therapy Goal  Goal: Occupational Therapy Goal  Goals to be met by: 7 days     Patient will increase functional independence with ADLs by performing:    Feeding with Modified [place].  UE Dressing with Modified [place].  LE Dressing with Set-up Assistance and Supervision using AD.  Grooming while standing with Modified [place].  Toileting from bedside commode with Supervision for hygiene and clothing management.   Bathing from  shower chair/bench with Supervision.  Toilet transfer to bedside commode with Stand-by Assistance.  Upper extremity exercise program x12 reps per handout, with supervision.    Outcome: Ongoing (interventions implemented as appropriate)    Pt was agreeable to OT evaluation.  Goals established to assist pt with returning to PLOF regarding ADLs and func mobility.  Pt will benefit from skilled OT services in order to increase her level of safety and independence with ADLs and mobility.      Nory Strong, OT  9/19/2017

## 2017-09-19 NOTE — HPI
Chief Complaint/Reason for Admission: Debility    History of Present Illness:  Patient is a 71 y.o. female who has a past medical history of CHF; COPD; CVA; Diabetes; GERD; Gout; Hypertension; Thyroid disease presented after being transferred from [facility] with acute altered mental status with agitation. After extensive workup up etiology likely was likely acute encephalopathy from viral encephalitis. This likely worsened her dementia due to old strokes. Hospital course was prolonged and complicated (all acute care notes have been reviewed in detail). Of note, she developed acute urinary retention and holley placed on 9/10. She arrived to SNF last night with hematuria that improved after being flushed with sterile normal saline and now urine that is clear yellow with slight sediment present. Patient has been working with PT/OT who recommend SNF for further balance/mobility training. Patient lives at home in [place] with her daughter and granddaughter. There is no family currently at bedside. She is tolerating diet without complaints and her last BM was this morning. Patient currently denies any fever/chills, chest pain, dyspnea, weakness, numbness, abdominal pain, nausea/vomiting, dysuria/hematuria, or weight loss.

## 2017-09-19 NOTE — ASSESSMENT & PLAN NOTE
Cont with PT/OT for gait training and strengthening and restoration of ADL's   Cont DVT prophylaxis with Lovenox

## 2017-09-19 NOTE — PLAN OF CARE
Problem: Patient Care Overview  Goal: Plan of Care Review  Outcome: Ongoing (interventions implemented as appropriate)   09/19/17 0026   Coping/Psychosocial   Plan Of Care Reviewed With patient       Problem: Pressure Ulcer Risk (Jesus Scale) (Adult,Obstetrics,Pediatric)  Goal: Skin Integrity  Patient will demonstrate the desired outcomes by discharge/transition of care.   Outcome: Ongoing (interventions implemented as appropriate)   09/19/17 0026   Pressure Ulcer Risk (Jesus Scale) (Adult,Obstetrics,Pediatric)   Skin Integrity making progress toward outcome       Problem: Fall Risk (Adult)  Goal: Absence of Falls  Patient will demonstrate the desired outcomes by discharge/transition of care.   Outcome: Ongoing (interventions implemented as appropriate)   09/19/17 0026   Fall Risk (Adult)   Absence of Falls making progress toward outcome

## 2017-09-20 LAB
POCT GLUCOSE: 164 MG/DL (ref 70–110)
POCT GLUCOSE: 165 MG/DL (ref 70–110)
POCT GLUCOSE: 175 MG/DL (ref 70–110)

## 2017-09-20 PROCEDURE — 99309 SBSQ NF CARE MODERATE MDM 30: CPT | Mod: ,,, | Performed by: NURSE PRACTITIONER

## 2017-09-20 PROCEDURE — 97535 SELF CARE MNGMENT TRAINING: CPT

## 2017-09-20 PROCEDURE — 97530 THERAPEUTIC ACTIVITIES: CPT

## 2017-09-20 PROCEDURE — 25000003 PHARM REV CODE 250: Performed by: HOSPITALIST

## 2017-09-20 PROCEDURE — 97116 GAIT TRAINING THERAPY: CPT

## 2017-09-20 PROCEDURE — 12000000 HC SNF SEMI-PRIVATE ROOM

## 2017-09-20 PROCEDURE — 25000003 PHARM REV CODE 250: Performed by: INTERNAL MEDICINE

## 2017-09-20 PROCEDURE — 25000003 PHARM REV CODE 250: Performed by: NURSE PRACTITIONER

## 2017-09-20 PROCEDURE — 63600175 PHARM REV CODE 636 W HCPCS: Performed by: INTERNAL MEDICINE

## 2017-09-20 PROCEDURE — 97110 THERAPEUTIC EXERCISES: CPT

## 2017-09-20 RX ORDER — TAMSULOSIN HYDROCHLORIDE 0.4 MG/1
0.4 CAPSULE ORAL DAILY
Status: DISCONTINUED | OUTPATIENT
Start: 2017-09-20 | End: 2017-09-25 | Stop reason: HOSPADM

## 2017-09-20 RX ADMIN — FUROSEMIDE 40 MG: 40 TABLET ORAL at 09:09

## 2017-09-20 RX ADMIN — CARVEDILOL 25 MG: 25 TABLET, FILM COATED ORAL at 09:09

## 2017-09-20 RX ADMIN — TAMSULOSIN HYDROCHLORIDE 0.4 MG: 0.4 CAPSULE ORAL at 05:09

## 2017-09-20 RX ADMIN — HYDRALAZINE HYDROCHLORIDE 50 MG: 50 TABLET ORAL at 05:09

## 2017-09-20 RX ADMIN — HYDRALAZINE HYDROCHLORIDE 75 MG: 50 TABLET ORAL at 09:09

## 2017-09-20 RX ADMIN — CARVEDILOL 25 MG: 25 TABLET, FILM COATED ORAL at 08:09

## 2017-09-20 RX ADMIN — HYDRALAZINE HYDROCHLORIDE 75 MG: 50 TABLET ORAL at 02:09

## 2017-09-20 RX ADMIN — LOSARTAN POTASSIUM 100 MG: 50 TABLET, FILM COATED ORAL at 08:09

## 2017-09-20 RX ADMIN — FUROSEMIDE 40 MG: 40 TABLET ORAL at 08:09

## 2017-09-20 RX ADMIN — LEVOTHYROXINE SODIUM 50 MCG: 50 TABLET ORAL at 05:09

## 2017-09-20 RX ADMIN — QUETIAPINE FUMARATE 25 MG: 25 TABLET, FILM COATED ORAL at 09:09

## 2017-09-20 RX ADMIN — POTASSIUM CHLORIDE 10 MEQ: 750 CAPSULE, EXTENDED RELEASE ORAL at 08:09

## 2017-09-20 RX ADMIN — STANDARDIZED SENNA CONCENTRATE AND DOCUSATE SODIUM 1 TABLET: 8.6; 5 TABLET, FILM COATED ORAL at 08:09

## 2017-09-20 RX ADMIN — ASPIRIN 325 MG ORAL TABLET 325 MG: 325 PILL ORAL at 08:09

## 2017-09-20 RX ADMIN — STANDARDIZED SENNA CONCENTRATE AND DOCUSATE SODIUM 1 TABLET: 8.6; 5 TABLET, FILM COATED ORAL at 09:09

## 2017-09-20 RX ADMIN — ENOXAPARIN SODIUM 40 MG: 100 INJECTION SUBCUTANEOUS at 05:09

## 2017-09-20 NOTE — PT/OT/SLP PROGRESS
Physical Therapy  Treatment    Blue Cassidy   MRN: [id number]   Admitting Diagnosis: Debility    PT Received On: 09/20/17  Total Time (min): 57       Billable Minutes:  Gait [id number], Therapeutic Activity 30, Therapeutic Exercise 8 and Total Time 57    Treatment Type: Treatment  PT/PTA: PT     PTA Visit Number: 0       General Precautions: Standard, aspiration, fall  Orthopedic Precautions: N/A   Braces: N/A    Do you have any cultural, spiritual, Ep[employer]alian conflicts, given your current situation?: none    Subjective:  Pt agreeable to session.    Pain/Comfort  Pain Rating 1: 4/10 (during amb only)  Location - Side 1: Left  Location - Orientation 1: generalized  Location 1: knee  Pain Addressed 1: Reposition, Pre-medicate for activity  Pain Rating Post-Intervention 1: 0/10    Objective:  Patient found supine in bed. Patient found with: holley catheter     Functional Status:  MDS G  Bed Mobility Functional Status: S-SBA  Transfer Functional Status: S-SBA  Walk in Room Functional Status: CGA-Min (A)  Walk in Corridor Functional Status: CGA-Min (A)  Locomotion on Unit Functional Status: total(A)    MDS GG  Lying to sitting on side of bed Performance: Supervision or touching assistance.  Sit to Stand Performance: Supervision or touching assistance.  Chair/bed-to-chair transfer Performance: Supervision or touching assistance., See goal below  Chair/bed-to-chair transfer Goal: Setup or clean-up assistance  Toilet transfer Performance: Supervision or touching assistance.  Does the resident walk?: Yes --> Continue to Walk 50 feet with two turns assessment  Walk 50 feet with two turns Performance: Supervision or touching assistance.  Walk 150 feet Performance: Not attempted due to medical condition or safety concerns     Bed Mobility:  Supine>Sit: on bed w/ SBA, HOB elevated and w/ side rail    Transfers:  Sit<>Stand: to/from w/c (5 trials) w/ RW and SBA for safety  Stand Pivot Transfer: EOB>w/c and bedside commode>w/c,  both w/ RW and SBA for safety  Cueing for hand placement    Gait:  Amb 4 trials (64ft, 88ft, 63ft, and 20ft) w/ RW and CGA/SBA for safety, cueing for upright posture and to remain inside RW, limited in distance by fatigue     Therex:  Seated BLE therex 2x15 reps (HF, LAQ, AP)    Balance:  Standing balloon tap [id number] w/ RW and CGA/SBA for safety, limited in time by fatigue, no major LOB    Additional Treatment:  Pt completed toileting during PT session. She was able to transfer to/from bedside commode w/ RW and SBA for safety. She was able to manage gown in standing w/ SBA. Pt required assistance for kirt-care following BM. Pt's nurse (Anahi) was notified of BM.    Patient left up in chair with all lines intact and call button in reach.    Assessment:  Blue Cassidy is a 71 y.o. female with a medical diagnosis of Debility.  Pt danis session well w/ good participation. She was able to complete multiple amb trials on this date. She is progressing well overall and will continue to benefit from skilled PT.    Rehab identified problem list/impairments: weakness, impaired endurance, impaired self care skills, impaired functional mobilty, gait instability, impaired balance    Rehab potential is good.    Activity tolerance: Good    Discharge recommendations: home health PT     Barriers to discharge: None    Equipment recommendations: bath bench     GOALS:    Physical Therapy Goals        Problem: Physical Therapy Goal    Goal Priority Disciplines Outcome Goal Variances Interventions   Physical Therapy Goal     PT/OT, PT Ongoing (interventions implemented as appropriate)     Description:  Goals to be met by: 6 days    Patient will increase functional independence with mobility by performin. Supine to sit with supervision. Not met  2. Sit to supine with supervision. Not met  3. Sit to stand transfer with Supervision. Not met  4. Bed to chair transfer with Supervision using Rolling Walker and/or rollator. Not met  5.  Gait  x 150 feet with Supervision using Rolling Walker and/or rollator. Not met   6. Ascend/descend 4 stairs with bilateral Handrails stand-by assistance. Not met  7. Ascend/Descend 4 inch curb step with Stand-by Assistance using Rolling Walker and/or rollator. Not met  8. Stand for 3 minutes with Stand-by Assistance using Rolling Walker and/or rollator. Not met  9. Lower extremity exercise program x 20 reps per handout, with independence. Not met                      PLAN:    Patient to be seen  (5-6x/wk)  to address the above listed problems via gait training, therapeutic activities, therapeutic exercises, neuromuscular re-education  Plan of Care expires: 10/19/17  Plan of Care reviewed with: patient    Augustina M Archie, PT  09/20/2017

## 2017-09-20 NOTE — PROGRESS NOTES
09/20/2017  10:49 AM  Discharge Planning Assessment     Payor: MEDICARE / Plan: MEDICARE A ONLY / Product Type: Government /     Expected length of stay:  [x] 7 days   [] 10 days  [] 14 days   [] 21 days   [] > 30 days    Communicated expected length of stay with patient/caregiver:  [x] Yes   [] No    Anticipated discharge date:  9/25/2017    Assessment information obtained from:  [x] Patient   [x] Caregiver     Arrived from:   [x] Home   [] Assisted Living    [] Nursing Home   [] SNF   [] Rehab  [] LTACH   [] Group Home   [] Foster Care   [] Psych   [] Shelter   [] Homeless   [] Transfer  [] Correctional Facility  [] Name of Facility:      Patient currently lives with:    [] Alone   [] Spouse   [x] Daughter   [] Son   [] Grandparents   []  Parents   [] Siblings   [] Friends   [] Domestic Partner   [] Facility Resident      [] Foster Home    [] Other:       Extended Emergency Contact Information  Primary Emergency Contact: Rebecca Cassidy   [facility]  Home Phone: [phone]  Relation: Daughter  Secondary Emergency Contact: Malgorzata Pereira  Address: [address] [place] [address]  Home Phone: [phone]  Mobile Phone: [phone]  Relation: Daughter     Prior to hospitalization cognitive status:   [x] Alert/Oriented  [] No Deficits [] Risk of Harm to Self/Others   [] Not Oriented to Person   [] Not Oriented to Place   [] Not Oriented to Time   [] Coma/Sedated/Intubated  [] Judgement Impaired    []  Unable to Assess   [] Inappropriate Behavior  [] Infant/Toddler    Prior to hospitalization functional status:   [x] Independent   [x] Assistive Equipment   [] Assistive Person    [] Completely Dependent  [] Infant/Toddler/Child Appropriate   [] Infant/Toddler/Child Delayed    []  Adolescent     Current cognitive status:   [x] Alert/Oriented  [] No Deficits [] Risk of Harm to Self/Others   [] Not Oriented to Person   [] Not Oriented to Place   [] Not Oriented to  Time   [] Coma/Sedated/Intubated  [] Judgement Impaired    []  Unable to Assess   [] Inappropriate Behavior  [] Infant/Toddler    Current functional status:     [] Independent   [x] Assistive Equipment   [x] Assistive Person     [] Completely Dependent   [] Infant/Toddler/Child Appropriate   [] Infant/Toddler/Child Delayed     [] Adolescent     Capacity to Care for Self:   Is patient able to return to prior living arrangements after discharge: [x] Yes  [] No     Is patient able to care for self after discharge?   [] Yes   [x] No     [] Pediatric     Does the patient have family/friends to assist after discharge?:  [x] Yes   [] No    [] N/A   Comments:  Daughter      Patient/caregiver perception of discharge disposition:   [] Home   [x] Home with Family  [x] Home Health   [] SNF   [] Rehab   [] LTAC    []  New Nursing Home Placement  [] Return to Nursing Home    [] Shelter     [] Assisted Living  [] Foster Home   [] Other:      Readmit:   Has patient delmy in the hospital in the last 30 days? [] Yes   [x] No     If YES, was patient admitted for the same reason?  [] Yes   [] No       Home Health:   Patient currently receives home health services?:   [] Yes   [x] No     Patient previously received home health services and would like to resume services if necessary   [] Yes   [x] No     DME:   Patient currently uses DME:   [x] Yes   [] No        List of equipment currently used:     [] Wheelchair   [] Standard Walker  [] Rolling Walker  [x]  Rollator    [] Oxygen    [] Portable oxygen   [] Nebulizer    [] Apnea Monitor    [] Crutches  [] Hospital Bed   []  Lift Device   [] Scooter [] Cane     [] Prosthesis   [x]  BSC   [] Tub Bench   [] Catheter Supplies    [] Ostomy Supplies   [] Trach Supplies     [] Suction Machine        [] Home Vent    [] Bipap   [] Other:         Medications:    Can the patient afford all prescribed medications?  [x] Yes   [] No     If NO, what medication:       Is the patient taking medications as  prescribed?    [x] Yes   [] No    Financial Concerns:   Does the patient have any financial concerns?   [] Yes   [x] No      If YES, what are the concerns:      Transportation:   Does the patient have transportation to healthcare appointments?   [x] Yes   [] No     If YES, what means of transportation does the patient have?   [] Car   [x] Family/Friend  [] Bicycle   [] Motorcycle   [] Public Transportation [] Ambulance[] Wheelchair van   [] Name of Provider    Dialysis:   Does the patient currently receive dialysis?   [] Yes   [x] No     Primary MD: Alphonso Bueno [place] [address]  Phone [phone]    APS/CPS involved in the case:  [] Yes   [x] No   If YES, name of :     If YES, phone number of :      Discharge Plan A:  [] Home   [x] Home with Family  [x] Home Health   [] SNF   [] Rehab   [] LTAC   []  New Nursing Home Placement  [] Return to Nursing Home    [] Assisted Living    [] Shelter     [] Private Duty Nursing   [] Foster Home    [] Psych    [] Early Steps  [] WIC       [] Home Hospice   [] Inpatient Hospice   [] Other    Discharge Plan B:  [] Home   [] Home with Family  [] Home Health   [] SNF   [] Rehab   [] LTAC  []  New Nursing Home Placement   [] Return to  Nursing Home    [] Assisted Living   [] Shelter  [] Private Duty Nursing   [] Foster Home     [] Psych     [] Early Steps  [] WIC    [] Home Hospice     [] Inpatient Hospice   [] Other     [x] Patient and family in agreement with discharge plan.    Rounded with NP Kendra, pharmacist Yu, and pharmacy students. Patient AAO. Discharge plan is to return home with assist of daughter. Informed patient therapy recommends a 1 week SNF stay and that we (MD, nurse, therapy, SW,CM) meet tomorrow to review her case and come up with a discharge date.  Patient stated understanding. Spoke with patient's daughter Rebecca via phone at [phone] to discuss discharge plan and date. Rebecca stated  patient has not used home health in the past. Rebecca stated patient would need a hospital bed at discharge. Per Rebecca patient's primary MD is Alphonso Phelan and patient does not have a urologist or neurologist. CM and SW will continue to follow for any additional needs.    Susan Campos RN, CM Skilled  [id number]

## 2017-09-20 NOTE — ASSESSMENT & PLAN NOTE
-Cont with PT/OT for gait training and strengthening and restoration of ADL's   -Continue DVT prophylaxis with Lovenox   delirium precautions  -fall precautions   -bowel regimen with senokt-s bid and bisacodyl suppository prn, hold for frequent stooling

## 2017-09-20 NOTE — ASSESSMENT & PLAN NOTE
-Maintain euvolemia by monitoring I/O's and daily weights.  -Fluid restriction (2 liters/24 hours)  -Low Na diet  -Continue ARB, BB.   -continue lasix, monitor renal function, was previous held for increase in Cr.

## 2017-09-20 NOTE — ASSESSMENT & PLAN NOTE
-Etiology unclear but likely CNS infection.   -Continues with evidence of mild encephalopathy which fluctuates but has improved per previous reports.  -Keppra has been discontinued by neurology   -Continue Seroquel as needed for acute agitation   -Delirium precautions   -Follow up with neurology as outpatient after discharge from SNF.

## 2017-09-20 NOTE — PLAN OF CARE
Problem: Patient Care Overview  Goal: Plan of Care Review  Outcome: Ongoing (interventions implemented as appropriate)   09/20/17 0252   Coping/Psychosocial   Plan Of Care Reviewed With patient       Problem: Pressure Ulcer Risk (Jesus Scale) (Adult,Obstetrics,Pediatric)  Goal: Skin Integrity  Patient will demonstrate the desired outcomes by discharge/transition of care.   Outcome: Ongoing (interventions implemented as appropriate)   09/20/17 0252   Pressure Ulcer Risk (Jesus Scale) (Adult,Obstetrics,Pediatric)   Skin Integrity making progress toward outcome       Problem: Fall Risk (Adult)  Goal: Absence of Falls  Patient will demonstrate the desired outcomes by discharge/transition of care.   Outcome: Ongoing (interventions implemented as appropriate)   09/20/17 0252   Fall Risk (Adult)   Absence of Falls making progress toward outcome

## 2017-09-20 NOTE — PROGRESS NOTES
[facility] Department of Hospital Medicine  Progress Note    Patient Name: Blue Cassidy  MRN: [id number]  Code Status: Full Code  Admission Date: 9/18/2017  Length of Stay: 2 days  Attending Physician: Lindsey Rodriguez MD  Primary Care Provider: Alphonso Phelan MD    Subjective:     Principal Problem:Debility    Chief Complaint/Reason for Admission: Debility    History of Present Illness:  Patient is a 71 y.o. female who has a past medical history of CHF; COPD; CVA; Diabetes; GERD; Gout; Hypertension; Thyroid disease presented after being transferred from [facility] with acute altered mental status with agitation. After extensive workup up etiology likely was likely acute encephalopathy from viral encephalitis. This likely worsened her dementia due to old strokes. Hospital course was prolonged and complicated (all acute care notes have been reviewed in detail). Of note, she developed acute urinary retention and holley placed on 9/10. She arrived to SNF last night with hematuria that improved after being flushed with sterile normal saline and now urine that is clear yellow with slight sediment present. Patient has been working with PT/OT who recommend SNF for further balance/mobility training. Patient lives at home in [place] with her daughter and granddaughter. There is no family currently at bedside. She is tolerating diet without complaints and her last BM was this morning. Patient currently denies any fever/chills, chest pain, dyspnea, weakness, numbness, abdominal pain, nausea/vomiting, dysuria/hematuria, or weight loss.     Hospital Course:  9/18/17: Patient admitted to SNF for ongoing PT/OT following a hospitalization for acute encephalopathy from viral encephalitis.  9/20: Patient seen at bedside, mild discomfort to bilateral knee, otherwise doing well     Interval History: Patient seen at bedside, doing well, holley with clear yellow urine, no acute events  overnight.    Review of Systems   Constitutional: Negative for appetite change, chills, fatigue and fever.   HENT: Negative for trouble swallowing.    Respiratory: Negative for cough, chest tightness, shortness of breath and wheezing.    Cardiovascular: Negative for chest pain, palpitations and leg swelling.   Gastrointestinal: Negative for abdominal pain, constipation, diarrhea and nausea.   Genitourinary: Negative for difficulty urinating, frequency and urgency.   Musculoskeletal: Positive for arthralgias. Negative for myalgias.        Discomfort to bilateral knees, reports arthritis    Skin: Negative for rash.   Neurological: Negative for dizziness, weakness, light-headedness and headaches.   Psychiatric/Behavioral: Negative for sleep disturbance.     Scheduled Meds:   aspirin  325 mg Oral Daily    carvedilol  25 mg Oral BID    enoxaparin  40 mg Subcutaneous Daily    furosemide  40 mg Oral BID    hydrALAZINE  75 mg Oral Q8H    levothyroxine  50 mcg Oral Before breakfast    losartan  100 mg Oral Daily    potassium chloride  10 mEq Oral Daily    senna-docusate 8.6-50 mg  1 tablet Oral BID    tamsulosin  0.4 mg Oral Daily     Continuous Infusions:   PRN Meds:.acetaminophen, acetaminophen, albuterol-ipratropium 2.5mg-0.5mg/3mL, bisacodyl, calcium carbonate, dextrose 50%, glucagon (human recombinant), INV hydrALAZINE, insulin aspart, quetiapine, ramelteon    Objective:     Vital Signs (Most Recent):  Temp: 98 °F (36.7 °C) (09/20/17 0831)  Pulse: (!) 55 (09/20/17 1448)  Resp: 18 (09/20/17 0831)  BP: (!) 144/66 (09/20/17 1448)  SpO2: 96 % (09/20/17 0831) Vital Signs (24h Range):  Temp:  [98 °F (36.7 °C)-98.1 °F (36.7 °C)] 98 °F (36.7 °C)  Pulse:  [55-65] 55  Resp:  [18] 18  SpO2:  [96 %] 96 %  BP: (144-182)/(66-85) 144/66     Weight: (!) 137.3 kg (302 lb 11.1 oz)  Body mass index is 51.93 kg/m².    Intake/Output Summary (Last 24 hours) at 09/20/17 1454  Last data filed at 09/20/17 0500   Gross per 24 hour    Intake              240 ml   Output             2550 ml   Net            -2310 ml      Physical Exam   Constitutional: She is oriented to person, place, and time. She appears well-developed and well-nourished. No distress.   obese   Cardiovascular: Normal rate, regular rhythm and normal heart sounds.  Exam reveals no gallop and no friction rub.    No murmur heard.  Pulmonary/Chest: Effort normal and breath sounds normal. No respiratory distress. She has no wheezes. She has no rales.   Abdominal: Soft. Bowel sounds are normal. She exhibits no distension. There is no tenderness.   Genitourinary:   Genitourinary Comments: + holley in place drainage clear yellow urine   Musculoskeletal: Normal range of motion. She exhibits edema. She exhibits no tenderness.   Trace edema to bilateral ankles   Neurological: She is alert and oriented to person, place, and time.   Skin: Skin is warm and dry. No rash noted. She is not diaphoretic. No cyanosis. Nails show no clubbing.   Psychiatric: She has a normal mood and affect. Her behavior is normal.       Significant Labs:     Recent Labs  Lab 09/16/17  0705 09/17/17  0514 09/18/17  0412 09/19/17  0605   WBC 4.15 4.34 5.27  --    HGB 12.0 11.8* 11.3* 11.8*   HCT 38.0 36.1* 34.8* 39.3    201 222  --        Recent Labs  Lab 09/17/17  0514 09/18/17  0412 09/19/17  0605    143 144   K 3.7 3.6 3.8    105 103   CO2 28 30* 34*   BUN 12 14 13   CREATININE 0.9 1.0 0.9   CALCIUM 9.0 8.8 8.9     Lab Results   Component Value Date    LABPROT 12.0 09/06/2017    ALBUMIN 3.4 (L) 09/06/2017     Lab Results   Component Value Date    CALCIUM 8.9 09/19/2017    PHOS 3.3 09/19/2017     POCT Glucose   Date Value Ref Range Status   09/20/2017 165 (H) 70 - 110 mg/dL Final   09/19/2017 158 (H) 70 - 110 mg/dL Final   09/19/2017 179 (H) 70 - 110 mg/dL Final   09/19/2017 170 (H) 70 - 110 mg/dL Final   09/19/2017 146 (H) 70 - 110 mg/dL Final   09/18/2017 189 (H) 70 - 110 mg/dL Final    09/18/2017 169 (H) 70 - 110 mg/dL Final   09/18/2017 152 (H) 70 - 110 mg/dL Final   09/18/2017 158 (H) 70 - 110 mg/dL Final   09/17/2017 193 (H) 70 - 110 mg/dL Final   09/17/2017 113 (H) 70 - 110 mg/dL Final     Results for KAPIL BRAND (MRN [id number]) as of 9/20/2017 15:20   Ref. Range 9/16/2017 07:05 9/17/2017 05:14 9/18/2017 04:12   Magnesium Latest Ref Range: 1.6 - 2.6 mg/dL 1.6 1.4 (L) 1.6     Significant Imaging: n/a    Assessment/Plan:      * Debility    -Cont with PT/OT for gait training and strengthening and restoration of ADL's   -Continue DVT prophylaxis with Lovenox   delirium precautions  -fall precautions   -bowel regimen with senokt-s bid and bisacodyl suppository prn, hold for frequent stooling        Chronic obstructive pulmonary disease    -No active wheezes  -Duonebs and oxygen as needed        Cerebrovascular accident (CVA)    -Continue  mg to treat per neurology  -PT/OT as above.         Acquired hypothyroidism    -chronic  -Continue synthroid to treat.         Acute urinary retention    -S/p holley placement  -Continue holley care  -Follow up with urology after discharge from SNF  -spoke with Daughter Manuel, void trial failed in the hospital, placed patient on flomax and discussed follow-up with urologist which she is requesting at a [place] location.        Hypokalemia    -Continue potassium to treat  -Monitor labs with biweekly lab draws.         CHF (congestive heart failure)    -Maintain euvolemia by monitoring I/O's and daily weights.  -Fluid restriction (2 liters/24 hours)  -Low Na diet  -Continue ARB, BB.   -continue lasix, monitor renal function, was previous held for increase in Cr.        HTN (hypertension), malignant    -Poorly controlled  -Increased hydralazine from 50 to 75mg TID  -Continue losartan 100mg daily   -continue coreg 25mg bid with hold parameters   -Continue to monitor and adjust regimen as needed        Type 2 diabetes mellitus with complication     -stable  -A1c in AM  -Continue ADA diet  -Continue BG checks  -SSI   -hypo/hyperglycemic protocol        Acute encephalopathy    -Etiology unclear but likely CNS infection.   -Continues with evidence of mild encephalopathy which fluctuates but has improved per previous reports.  -Keppra has been discontinued by neurology   -Continue Seroquel as needed for acute agitation   -Delirium precautions   -Follow up with neurology as outpatient after discharge from SNF.           Kendra Valdes NP  Department of Hospital Medicine  [facility]

## 2017-09-20 NOTE — HOSPITAL COURSE
9/18/17: Patient admitted to SNF for ongoing PT/OT following a hospitalization for acute encephalopathy from viral encephalitis.  9/20: Patient seen at bedside, mild discomfort to bilateral knee, otherwise doing well   9/21: urine studies positive for UTI treated with cipro waiting for sensitives.   9/25: urine sensitive to Macrodantin, ABX switched, discussed discharge and follow-up with urology. Answered all questions.

## 2017-09-20 NOTE — ASSESSMENT & PLAN NOTE
-Poorly controlled  -Increased hydralazine from 50 to 75mg TID  -Continue losartan 100mg daily   -continue coreg 25mg bid with hold parameters   -Continue to monitor and adjust regimen as needed

## 2017-09-20 NOTE — PT/OT/SLP PROGRESS
"Occupational Therapy  Treatment    Blue Cassidy   MRN: [id number]   Admitting Diagnosis: Debility       OT Date of Treatment: 09/20/17  Total Time (min): 60 min     Billable Minutes:  Self Care/Home Management 12, Therapeutic Activity 15 and Therapeutic Exercise 33     General Precautions: Standard, fall  Orthopedic Precautions: N/A  Braces: N/A         Subjective:  Communicated with nurse prior to session.  "I don't think I'll be here that long."     Pain/Comfort  Pain Rating 1: 0/10  Pain Rating Post-Intervention 1: 0/10     Objective:  Patient found with: holley catheter (sitting inw/c)     Functional Status:  MDS G  Transfer Functional Status: CGA  Transfer Level of (A):  (CGA for sit to stand using table top for support)     Dressing Functional Status: 1: S-SBA  Dressing Level of (A) :  (S to edgard/doff socks using reacher and sock aide with cues to use equipment correctly)     Moving from seated to standing position: Not steady, only able to stabilize with staff assistance  Walking (with assistive device if used): Not steady, only able to stabilize with staff assistance  Turning around and facing the opposite direction while walking: Not steady, only able to stabilize with staff assistance  Moving on and off the toilet: Not steady, only able to stabilize with staff assistance  Surface-to-surface transfer (transfer between bed and chair or wheelchair): Not steady, only able to stabilize with staff assistance            OT Exercises:   · AROM using 1# dowel, pt completed the following exercises for 3 sets of 10 reps of shoudler press over head and shoulder press vertically; pt completed 3 sets of 15 reps of biceps curls  · UE Ergometer pt worked on UBE for 15 minutes on low resistance with ~7 short rest breaks during alloted time     Additional Treatment:  · Pt worked on LE dressing using AD with cues and set up  · Pt completed UE exercises with frequent rest breaks due to easily fatigued  · Pt participated in " "standing activity to work on standing endurance for safer ADLs - pt able to tolerate standing for 3'20" - pt required SBA during task with WB on UEs for stability during task     Patient left up in chair with call button in reach     ASSESSMENT:  Ms Khan was agreeable to OT and was noted to make progress towards her goals in therapy.  Pt's goals remain appropriate at this time. She is working towards improving her endurance and strength to improve her overall quality and safety with mobility and functional tasks.   She will continue to benefit from skilled OT services in order to assist her with increasing her safety and level of independence with self care and mobility tasks.         Rehab identified problem list/impairments: weakness, impaired endurance, impaired self care skills, impaired functional mobilty, gait instability, impaired balance, decreased lower extremity function, decreased safety awareness     Rehab potential is good     Activity tolerance: Good     Discharge recommendations: home with home health      Barriers to discharge: None      Equipment recommendations: bath bench        Occupational Therapy Goals:   Pt will reach the following goals in 7 days:    Patient will increase functional independence with ADLs by performing:    Feeding with Modified [place].  UE Dressing with Modified [place].  LE Dressing with Set-up Assistance and Supervision using AD.  Grooming while standing with Modified [place].  Toileting from bedside commode with Supervision for hygiene and clothing management.   Bathing from  shower chair/bench with Supervision.  Toilet transfer to bedside commode with Stand-by Assistance.  Upper extremity exercise program x12 reps per handout, with supervision.         Plan:  Patient to be seen 5-6 x/week to address the above listed problems via self-care/home management, therapeutic activities, therapeutic exercises  Plan of Care expires: 10/12/17  Plan of Care " reviewed with: patient     Nory Strong, OT

## 2017-09-20 NOTE — PLAN OF CARE
Problem: Fall Risk (Adult)  Goal: Absence of Falls  Patient will demonstrate the desired outcomes by discharge/transition of care.   Outcome: Ongoing (interventions implemented as appropriate)  Pt. Had no falls this shift. Will continue to ,omitor pt.

## 2017-09-20 NOTE — PLAN OF CARE
Problem: Occupational Therapy Goal  Goal: Occupational Therapy Goal  Goals to be met by: 7 days     Patient will increase functional independence with ADLs by performing:    Feeding with Modified [place].  UE Dressing with Modified [place].  LE Dressing with Set-up Assistance and Supervision using AD.  Grooming while standing with Modified [place].  Toileting from bedside commode with Supervision for hygiene and clothing management.   Bathing from  shower chair/bench with Supervision.  Toilet transfer to bedside commode with Stand-by Assistance.  Upper extremity exercise program x12 reps per handout, with supervision.     Outcome: Ongoing (interventions implemented as appropriate)    Pt was agreeable to OT and was noted to make progress towards her goals in therapy.  Pt's goals remain appropriate at this time.  She will continue to benefit from skilled OT services in order to assist her with increasing her safety and level of independence with self care and mobility tasks.     Nory Strong, OT  9/20/2017

## 2017-09-20 NOTE — PLAN OF CARE
Problem: Physical Therapy Goal  Goal: Physical Therapy Goal  Goals to be met by: 6 days    Patient will increase functional independence with mobility by performin. Supine to sit with supervision. Not met  2. Sit to supine with supervision. Not met  3. Sit to stand transfer with Supervision. Not met  4. Bed to chair transfer with Supervision using Rolling Walker and/or rollator. Not met  5. Gait  x 150 feet with Supervision using Rolling Walker and/or rollator. Not met   6. Ascend/descend 4 stairs with bilateral Handrails stand-by assistance. Not met  7. Ascend/Descend 4 inch curb step with Stand-by Assistance using Rolling Walker and/or rollator. Not met  8. Stand for 3 minutes with Stand-by Assistance using Rolling Walker and/or rollator. Not met  9. Lower extremity exercise program x 20 reps per handout, with independence. Not met     LTGs remain appropriate. Pt will continue PT POC.    Augustina Almanza, PT  2017

## 2017-09-20 NOTE — SUBJECTIVE & OBJECTIVE
Hospital Course:  9/18/17: Patient admitted to SNF for ongoing PT/OT following a hospitalization for acute encephalopathy from viral encephalitis.  9/20: Patient seen at bedside, mild discomfort to bilateral knee, otherwise doing well     Interval History: Patient seen at bedside, doing well, holley with clear yellow urine, no acute events overnight.    Review of Systems   Constitutional: Negative for appetite change, chills, fatigue and fever.   HENT: Negative for trouble swallowing.    Respiratory: Negative for cough, chest tightness, shortness of breath and wheezing.    Cardiovascular: Negative for chest pain, palpitations and leg swelling.   Gastrointestinal: Negative for abdominal pain, constipation, diarrhea and nausea.   Genitourinary: Negative for difficulty urinating, frequency and urgency.   Musculoskeletal: Positive for arthralgias. Negative for myalgias.        Discomfort to bilateral knees, reports arthritis    Skin: Negative for rash.   Neurological: Negative for dizziness, weakness, light-headedness and headaches.   Psychiatric/Behavioral: Negative for sleep disturbance.     Scheduled Meds:   aspirin  325 mg Oral Daily    carvedilol  25 mg Oral BID    enoxaparin  40 mg Subcutaneous Daily    furosemide  40 mg Oral BID    hydrALAZINE  75 mg Oral Q8H    levothyroxine  50 mcg Oral Before breakfast    losartan  100 mg Oral Daily    potassium chloride  10 mEq Oral Daily    senna-docusate 8.6-50 mg  1 tablet Oral BID    tamsulosin  0.4 mg Oral Daily     Continuous Infusions:   PRN Meds:.acetaminophen, acetaminophen, albuterol-ipratropium 2.5mg-0.5mg/3mL, bisacodyl, calcium carbonate, dextrose 50%, glucagon (human recombinant), INV hydrALAZINE, insulin aspart, quetiapine, ramelteon    Objective:     Vital Signs (Most Recent):  Temp: 98 °F (36.7 °C) (09/20/17 0831)  Pulse: (!) 55 (09/20/17 1448)  Resp: 18 (09/20/17 0831)  BP: (!) 144/66 (09/20/17 1448)  SpO2: 96 % (09/20/17 0831) Vital Signs (24h  Range):  Temp:  [98 °F (36.7 °C)-98.1 °F (36.7 °C)] 98 °F (36.7 °C)  Pulse:  [55-65] 55  Resp:  [18] 18  SpO2:  [96 %] 96 %  BP: (144-182)/(66-85) 144/66     Weight: (!) 137.3 kg (302 lb 11.1 oz)  Body mass index is 51.93 kg/m².    Intake/Output Summary (Last 24 hours) at 09/20/17 1454  Last data filed at 09/20/17 0500   Gross per 24 hour   Intake              240 ml   Output             2550 ml   Net            -2310 ml      Physical Exam   Constitutional: She is oriented to person, place, and time. She appears well-developed and well-nourished. No distress.   obese   Cardiovascular: Normal rate, regular rhythm and normal heart sounds.  Exam reveals no gallop and no friction rub.    No murmur heard.  Pulmonary/Chest: Effort normal and breath sounds normal. No respiratory distress. She has no wheezes. She has no rales.   Abdominal: Soft. Bowel sounds are normal. She exhibits no distension. There is no tenderness.   Genitourinary:   Genitourinary Comments: + holley in place drainage clear yellow urine   Musculoskeletal: Normal range of motion. She exhibits edema. She exhibits no tenderness.   Trace edema to bilateral ankles   Neurological: She is alert and oriented to person, place, and time.   Skin: Skin is warm and dry. No rash noted. She is not diaphoretic. No cyanosis. Nails show no clubbing.   Psychiatric: She has a normal mood and affect. Her behavior is normal.       Significant Labs:     Recent Labs  Lab 09/16/17  0705 09/17/17  0514 09/18/17  0412 09/19/17  0605   WBC 4.15 4.34 5.27  --    HGB 12.0 11.8* 11.3* 11.8*   HCT 38.0 36.1* 34.8* 39.3    201 222  --        Recent Labs  Lab 09/17/17  0514 09/18/17  0412 09/19/17  0605    143 144   K 3.7 3.6 3.8    105 103   CO2 28 30* 34*   BUN 12 14 13   CREATININE 0.9 1.0 0.9   CALCIUM 9.0 8.8 8.9     Lab Results   Component Value Date    LABPROT 12.0 09/06/2017    ALBUMIN 3.4 (L) 09/06/2017     Lab Results   Component Value Date    CALCIUM 8.9  09/19/2017    PHOS 3.3 09/19/2017     POCT Glucose   Date Value Ref Range Status   09/20/2017 165 (H) 70 - 110 mg/dL Final   09/19/2017 158 (H) 70 - 110 mg/dL Final   09/19/2017 179 (H) 70 - 110 mg/dL Final   09/19/2017 170 (H) 70 - 110 mg/dL Final   09/19/2017 146 (H) 70 - 110 mg/dL Final   09/18/2017 189 (H) 70 - 110 mg/dL Final   09/18/2017 169 (H) 70 - 110 mg/dL Final   09/18/2017 152 (H) 70 - 110 mg/dL Final   09/18/2017 158 (H) 70 - 110 mg/dL Final   09/17/2017 193 (H) 70 - 110 mg/dL Final   09/17/2017 113 (H) 70 - 110 mg/dL Final     Results for KAPIL BRAND (MRN [id number]) as of 9/20/2017 15:20   Ref. Range 9/16/2017 07:05 9/17/2017 05:14 9/18/2017 04:12   Magnesium Latest Ref Range: 1.6 - 2.6 mg/dL 1.6 1.4 (L) 1.6     Significant Imaging: n/a

## 2017-09-20 NOTE — ASSESSMENT & PLAN NOTE
-S/p holley placement  -Continue holley care  -Follow up with urology after discharge from SNF  -spoke with Daughter Manuel, void trial failed in the hospital, placed patient on flomax and discussed follow-up with urologist which she is requesting at a [place] location.

## 2017-09-21 LAB
ANION GAP SERPL CALC-SCNC: 8 MMOL/L
BASOPHILS # BLD AUTO: 0.01 K/UL
BASOPHILS NFR BLD: 0.2 %
BUN SERPL-MCNC: 16 MG/DL
CALCIUM SERPL-MCNC: 9 MG/DL
CHLORIDE SERPL-SCNC: 99 MMOL/L
CO2 SERPL-SCNC: 33 MMOL/L
CREAT SERPL-MCNC: 0.9 MG/DL
DIFFERENTIAL METHOD: ABNORMAL
EOSINOPHIL # BLD AUTO: 0.1 K/UL
EOSINOPHIL NFR BLD: 2.2 %
ERYTHROCYTE [DISTWIDTH] IN BLOOD BY AUTOMATED COUNT: 14.6 %
EST. GFR  (AFRICAN AMERICAN): >60 ML/MIN/1.73 M^2
EST. GFR  (NON AFRICAN AMERICAN): >60 ML/MIN/1.73 M^2
ESTIMATED AVG GLUCOSE: 154 MG/DL
GLUCOSE SERPL-MCNC: 132 MG/DL
HBA1C MFR BLD HPLC: 7 %
HCT VFR BLD AUTO: 37.9 %
HGB BLD-MCNC: 11.6 G/DL
LYMPHOCYTES # BLD AUTO: 1.9 K/UL
LYMPHOCYTES NFR BLD: 32.2 %
MAGNESIUM SERPL-MCNC: 1.6 MG/DL
MCH RBC QN AUTO: 28.3 PG
MCHC RBC AUTO-ENTMCNC: 30.6 G/DL
MCV RBC AUTO: 92 FL
MONOCYTES # BLD AUTO: 0.8 K/UL
MONOCYTES NFR BLD: 12.8 %
NEUTROPHILS # BLD AUTO: 3.1 K/UL
NEUTROPHILS NFR BLD: 52.6 %
PHOSPHATE SERPL-MCNC: 3.4 MG/DL
PLATELET # BLD AUTO: 224 K/UL
PMV BLD AUTO: 11.3 FL
POCT GLUCOSE: 151 MG/DL (ref 70–110)
POCT GLUCOSE: 163 MG/DL (ref 70–110)
POCT GLUCOSE: 173 MG/DL (ref 70–110)
POCT GLUCOSE: 203 MG/DL (ref 70–110)
POTASSIUM SERPL-SCNC: 3.5 MMOL/L
RBC # BLD AUTO: 4.1 M/UL
SODIUM SERPL-SCNC: 140 MMOL/L
WBC # BLD AUTO: 5.96 K/UL

## 2017-09-21 PROCEDURE — 25000003 PHARM REV CODE 250: Performed by: HOSPITALIST

## 2017-09-21 PROCEDURE — 36415 COLL VENOUS BLD VENIPUNCTURE: CPT

## 2017-09-21 PROCEDURE — 97530 THERAPEUTIC ACTIVITIES: CPT

## 2017-09-21 PROCEDURE — 97116 GAIT TRAINING THERAPY: CPT

## 2017-09-21 PROCEDURE — 63600175 PHARM REV CODE 636 W HCPCS: Performed by: INTERNAL MEDICINE

## 2017-09-21 PROCEDURE — 12000000 HC SNF SEMI-PRIVATE ROOM

## 2017-09-21 PROCEDURE — 25000003 PHARM REV CODE 250: Performed by: NURSE PRACTITIONER

## 2017-09-21 PROCEDURE — 80048 BASIC METABOLIC PNL TOTAL CA: CPT

## 2017-09-21 PROCEDURE — 84100 ASSAY OF PHOSPHORUS: CPT

## 2017-09-21 PROCEDURE — 83036 HEMOGLOBIN GLYCOSYLATED A1C: CPT

## 2017-09-21 PROCEDURE — 83735 ASSAY OF MAGNESIUM: CPT

## 2017-09-21 PROCEDURE — 25000003 PHARM REV CODE 250: Performed by: INTERNAL MEDICINE

## 2017-09-21 PROCEDURE — 85025 COMPLETE CBC W/AUTO DIFF WBC: CPT

## 2017-09-21 PROCEDURE — 97110 THERAPEUTIC EXERCISES: CPT

## 2017-09-21 RX ORDER — CIPROFLOXACIN 500 MG/1
500 TABLET ORAL EVERY 12 HOURS
Status: DISCONTINUED | OUTPATIENT
Start: 2017-09-21 | End: 2017-09-25

## 2017-09-21 RX ADMIN — ASPIRIN 325 MG ORAL TABLET 325 MG: 325 PILL ORAL at 08:09

## 2017-09-21 RX ADMIN — HYDRALAZINE HYDROCHLORIDE 75 MG: 50 TABLET ORAL at 09:09

## 2017-09-21 RX ADMIN — CIPROFLOXACIN HYDROCHLORIDE 500 MG: 500 TABLET, FILM COATED ORAL at 09:09

## 2017-09-21 RX ADMIN — TAMSULOSIN HYDROCHLORIDE 0.4 MG: 0.4 CAPSULE ORAL at 08:09

## 2017-09-21 RX ADMIN — LEVOTHYROXINE SODIUM 50 MCG: 50 TABLET ORAL at 05:09

## 2017-09-21 RX ADMIN — CIPROFLOXACIN HYDROCHLORIDE 500 MG: 500 TABLET, FILM COATED ORAL at 10:09

## 2017-09-21 RX ADMIN — STANDARDIZED SENNA CONCENTRATE AND DOCUSATE SODIUM 1 TABLET: 8.6; 5 TABLET, FILM COATED ORAL at 09:09

## 2017-09-21 RX ADMIN — CARVEDILOL 25 MG: 25 TABLET, FILM COATED ORAL at 08:09

## 2017-09-21 RX ADMIN — FUROSEMIDE 40 MG: 40 TABLET ORAL at 09:09

## 2017-09-21 RX ADMIN — QUETIAPINE FUMARATE 25 MG: 25 TABLET, FILM COATED ORAL at 09:09

## 2017-09-21 RX ADMIN — LOSARTAN POTASSIUM 100 MG: 50 TABLET, FILM COATED ORAL at 08:09

## 2017-09-21 RX ADMIN — ENOXAPARIN SODIUM 40 MG: 100 INJECTION SUBCUTANEOUS at 05:09

## 2017-09-21 RX ADMIN — HYDRALAZINE HYDROCHLORIDE 75 MG: 50 TABLET ORAL at 01:09

## 2017-09-21 RX ADMIN — CARVEDILOL 25 MG: 25 TABLET, FILM COATED ORAL at 09:09

## 2017-09-21 RX ADMIN — INSULIN ASPART 1 UNITS: 100 INJECTION, SOLUTION INTRAVENOUS; SUBCUTANEOUS at 09:09

## 2017-09-21 RX ADMIN — HYDRALAZINE HYDROCHLORIDE 75 MG: 50 TABLET ORAL at 05:09

## 2017-09-21 RX ADMIN — POTASSIUM CHLORIDE 10 MEQ: 750 CAPSULE, EXTENDED RELEASE ORAL at 08:09

## 2017-09-21 RX ADMIN — FUROSEMIDE 40 MG: 40 TABLET ORAL at 08:09

## 2017-09-21 NOTE — PLAN OF CARE
Problem: Physical Therapy Goal  Goal: Physical Therapy Goal  Goals to be met by: 6 days    Patient will increase functional independence with mobility by performin. Supine to sit with supervision. Not met  2. Sit to supine with supervision. Not met  3. Sit to stand transfer with Supervision. Not met  4. Bed to chair transfer with Supervision using Rolling Walker and/or rollator. Not met  5. Gait  x 150 feet with Supervision using Rolling Walker and/or rollator. Not met   6. Ascend/descend 4 stairs with bilateral Handrails stand-by assistance. Not met  7. Ascend/Descend 4 inch curb step with Stand-by Assistance using Rolling Walker and/or rollator. Not met  8. Stand for 3 minutes with Stand-by Assistance using Rolling Walker and/or rollator. Not met  9. Lower extremity exercise program x 20 reps per handout, with independence. Not met     Outcome: Ongoing (interventions implemented as appropriate)  Goals remain appropriate       The patient is a 20y Female complaining of

## 2017-09-21 NOTE — PT/OT/SLP PROGRESS
"Occupational Therapy  Treatment    Blue Cassidy   MRN: [id number]   Admitting Diagnosis: Debility    OT Date of Treatment: 09/21/17       Billable Minutes:  Therapeutic Activity 47    General Precautions: Standard, aspiration, fall  Orthopedic Precautions: N/A  Braces: N/A         Subjective:  Communicated with patient prior to session.  "I can't stand for all this. I've got a bad back."    Pain/Comfort  Pain Rating 1: 0/10  Pain Rating Post-Intervention 1: 0/10    Objective:  Patient found with: holley catheter seated up in wheelchair bedside    Functional Status:  MDS G  Transfer Functional Status: S-SBA  Moving from seated to standing position: Not steady, but able to stabilize without staff assistance  Turning around and facing the opposite direction while walking: Not steady, only able to stabilize with staff assistance    Treatment:  Training and education provided during standing and seated tasks for safety and fall prevention to increase independence with ADL and functional mobility. Tolerated standing x 8 for 3-4 minutes with 1-2 minute rest breaks and moderate verbal cues to not lean on walker first 4 trials only. Standing balance crossing midline fair plus. Trunk strengthening and UE reaching in sitting 4 mins x 3.     Patient left up in chair with call button in reach    ASSESSMENT:  Blue Cassidy is a 71 y.o. female with a medical diagnosis of Debility and acute hospitalization for viral encephalopathy. Pleasant and agreeable to treatment. 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 Standing tolerance and balance increased after 3 trials and trunk strength and reach also improved as patient reached for table out of base of support. Patient would benefit from continued OT intervention in order to maximize functional ability for safe DC home, and after DC to reduce fall risk in home environment.    Rehab " identified problem list/impairments: weakness, impaired endurance, impaired self care skills, impaired functional mobilty, gait instability, impaired balance, decreased coordination, decreased lower extremity function, decreased safety awareness, impaired coordination, edema    Rehab potential is good    Activity tolerance: Good    Discharge recommendations: home health OT     Barriers to discharge: None     Equipment recommendations: bath bench     GOALS:    Occupational Therapy Goals        Problem: Occupational Therapy Goal    Goal Priority Disciplines Outcome Interventions   Occupational Therapy Goal     OT, PT/OT Ongoing (interventions implemented as appropriate)    Description:  Goals to be met by: 7 days     Patient will increase functional independence with ADLs by performing:    Feeding with Modified [place].  UE Dressing with Modified [place].  LE Dressing with Set-up Assistance and Supervision using AD.  Grooming while standing with Modified [place].  Toileting from bedside commode with Supervision for hygiene and clothing management.   Bathing from  shower chair/bench with Supervision.  Toilet transfer to bedside commode with Stand-by Assistance.  Upper extremity exercise program x12 reps per handout, with supervision.                      Plan:  Patient to be seen  (5-6x/week) to address the above listed problems via self-care/home management, therapeutic activities, therapeutic exercises  Plan of Care expires: 10/19/17  Plan of Care reviewed with: patient    KAUR Hurtado  09/21/2017

## 2017-09-21 NOTE — PLAN OF CARE
Problem: Fall Risk (Adult)  Goal: Absence of Falls  Patient will demonstrate the desired outcomes by discharge/transition of care.   Outcome: Ongoing (interventions implemented as appropriate)  Pt. Had no falls this shift. Will continue to monitor pt.

## 2017-09-21 NOTE — PT/OT/SLP PROGRESS
"Physical Therapy  Treatment    Blue Cassidy   MRN: [id number]   Admitting Diagnosis: Debility    PT Received On: 09/21/17  Total Time (min): 45       Billable Minutes:  Gait [id number] and Therapeutic Exercise 30    Treatment Type: Treatment  PT/PTA: PTA     PTA Visit Number: 1       General Precautions: Standard, aspiration, fall  Orthopedic Precautions: N/A   Braces: N/A    Do you have any cultural, spiritual, Restorationist conflicts, given your current situation?: none    Subjective:  "Give me that blanket."    Pain/Comfort  Pain Rating 1: 0/10  Pain Rating Post-Intervention 1: 0/10    Objective:  Patient found seated EOB with Patient found with: holley catheter       Functional Status:  MDS G  Transfer Functional Status: S-SBA  Walk in Room Functional Status: CGA-Min (A)  Walk in Corridor Functional Status: CGA-Min (A)  Locomotion on Unit Functional Status: CGA-Min (A)        Transfers:  Sit <> stand from EOB, w/c with SBA  SPT from bed > w/c with SBA    Gait:  Pt gait trained 75 feet x 2 trials and 120 feet with RW and CGA/SBA for balance and safety.   Assistive Device: rolling walker  Gait Deviation(s): decreased brii;decreased toe-to-floor clearance;decreased weight-shifting ability;decreased velocity of limb motion;decreased step length;decreased stride length;decreased swing-to-stance ratio       Therex:  BLEs seated therex x 20 reps includings: APs, GS, ADD/ABD, LAQs, hip flexion     LBE x 15 minutes to increase strength, endurance and coordination    Patient left up in chair with call button in reach.      Assessment:  Blue Cassidy is a 71 y.o. female with a medical diagnosis of Debility.  Pt tolerated treatment session fairly. Pt remains at a CGA/SBA functional level and will continue to benefit from skilled PT services to progress with mobility. Goals remain appropriate.        Rehab identified problem list/impairments: weakness, impaired endurance, impaired self care skills, impaired functional " mobilty, gait instability, impaired balance    Rehab potential is fair.    Activity tolerance: Fair    Discharge recommendations: home health PT     Barriers to discharge: None    Equipment recommendations: bath bench     GOALS:    Physical Therapy Goals        Problem: Physical Therapy Goal    Goal Priority Disciplines Outcome Goal Variances Interventions   Physical Therapy Goal     PT/OT, PT Ongoing (interventions implemented as appropriate)     Description:  Goals to be met by: 6 days    Patient will increase functional independence with mobility by performin. Supine to sit with supervision. Not met  2. Sit to supine with supervision. Not met  3. Sit to stand transfer with Supervision. Not met  4. Bed to chair transfer with Supervision using Rolling Walker and/or rollator. Not met  5. Gait  x 150 feet with Supervision using Rolling Walker and/or rollator. Not met   6. Ascend/descend 4 stairs with bilateral Handrails stand-by assistance. Not met  7. Ascend/Descend 4 inch curb step with Stand-by Assistance using Rolling Walker and/or rollator. Not met  8. Stand for 3 minutes with Stand-by Assistance using Rolling Walker and/or rollator. Not met  9. Lower extremity exercise program x 20 reps per handout, with independence. Not met                      PLAN:    Patient to be seen  (5-6x/wk)  to address the above listed problems via gait training, therapeutic activities, therapeutic exercises, neuromuscular re-education  Plan of Care expires: 10/19/17  Plan of Care reviewed with: patient    Jeffrey EDWARDS Magdalena, PTA  2017

## 2017-09-21 NOTE — PLAN OF CARE
Problem: Occupational Therapy Goal  Goal: Occupational Therapy Goal  Goals to be met by: 7 days     Patient will increase functional independence with ADLs by performing:    Feeding with Modified [place].  UE Dressing with Modified [place].  LE Dressing with Set-up Assistance and Supervision using AD.  Grooming while standing with Modified [place].  Toileting from bedside commode with Supervision for hygiene and clothing management.   Bathing from  shower chair/bench with Supervision.  Toilet transfer to bedside commode with Stand-by Assistance.  Upper extremity exercise program x12 reps per handout, with supervision.     Outcome: Ongoing (interventions implemented as appropriate)  Goals appropriate. Continue with current plan of care.  KAUR Hurtado  9/21/2017

## 2017-09-21 NOTE — PLAN OF CARE
Problem: Patient Care Overview  Goal: Plan of Care Review  Outcome: Ongoing (interventions implemented as appropriate)  Pt a o x 2, vss, resp effort even and unlabored, voids per BR,. Walks with walker and assist. Call bell within reach, side rails up x 3. Pt has remained free from falls. Will continue to monitor   09/21/17 [id number]   Coping/Psychosocial   Plan Of Care Reviewed With patient

## 2017-09-22 LAB
AMPHIPHYSIN AB TITR CSF: NEGATIVE TITER
BACTERIA UR CULT: NORMAL
CV2 IGG TITR CSF: NEGATIVE TITER
GLIAL NUC TYPE 1 AB TITR CSF: NEGATIVE TITER
HU1 AB TITR CSF IF: NEGATIVE TITER
HU2 AB TITR CSF IF: NEGATIVE TITER
HU3 AB TITR CSF: NEGATIVE TITER
PARANEOPLASTIC INTERPRETATION,CSF: NORMAL
PCA-2 AB TITR CSF: NEGATIVE TITER
PCA-TR AB TITR CSF: NEGATIVE TITER
PNEOE REFLEX TEST ADDED: NORMAL
POCT GLUCOSE: 154 MG/DL (ref 70–110)
POCT GLUCOSE: 159 MG/DL (ref 70–110)
POCT GLUCOSE: 166 MG/DL (ref 70–110)
POCT GLUCOSE: 199 MG/DL (ref 70–110)
PURKINJE CELLS AB TITR CSF IF: NEGATIVE TITER

## 2017-09-22 PROCEDURE — 25000003 PHARM REV CODE 250: Performed by: NURSE PRACTITIONER

## 2017-09-22 PROCEDURE — 97110 THERAPEUTIC EXERCISES: CPT

## 2017-09-22 PROCEDURE — 12000000 HC SNF SEMI-PRIVATE ROOM

## 2017-09-22 PROCEDURE — 63600175 PHARM REV CODE 636 W HCPCS: Performed by: INTERNAL MEDICINE

## 2017-09-22 PROCEDURE — 97535 SELF CARE MNGMENT TRAINING: CPT

## 2017-09-22 PROCEDURE — 97116 GAIT TRAINING THERAPY: CPT

## 2017-09-22 PROCEDURE — 25000003 PHARM REV CODE 250: Performed by: HOSPITALIST

## 2017-09-22 PROCEDURE — 97530 THERAPEUTIC ACTIVITIES: CPT

## 2017-09-22 PROCEDURE — 25000003 PHARM REV CODE 250: Performed by: INTERNAL MEDICINE

## 2017-09-22 RX ADMIN — FUROSEMIDE 40 MG: 40 TABLET ORAL at 09:09

## 2017-09-22 RX ADMIN — CARVEDILOL 25 MG: 25 TABLET, FILM COATED ORAL at 09:09

## 2017-09-22 RX ADMIN — POTASSIUM CHLORIDE 10 MEQ: 750 CAPSULE, EXTENDED RELEASE ORAL at 09:09

## 2017-09-22 RX ADMIN — HYDRALAZINE HYDROCHLORIDE 75 MG: 50 TABLET ORAL at 05:09

## 2017-09-22 RX ADMIN — CIPROFLOXACIN HYDROCHLORIDE 500 MG: 500 TABLET, FILM COATED ORAL at 09:09

## 2017-09-22 RX ADMIN — LEVOTHYROXINE SODIUM 50 MCG: 50 TABLET ORAL at 05:09

## 2017-09-22 RX ADMIN — ENOXAPARIN SODIUM 40 MG: 100 INJECTION SUBCUTANEOUS at 05:09

## 2017-09-22 RX ADMIN — STANDARDIZED SENNA CONCENTRATE AND DOCUSATE SODIUM 1 TABLET: 8.6; 5 TABLET, FILM COATED ORAL at 09:09

## 2017-09-22 RX ADMIN — ASPIRIN 325 MG ORAL TABLET 325 MG: 325 PILL ORAL at 09:09

## 2017-09-22 RX ADMIN — TAMSULOSIN HYDROCHLORIDE 0.4 MG: 0.4 CAPSULE ORAL at 09:09

## 2017-09-22 RX ADMIN — LOSARTAN POTASSIUM 100 MG: 50 TABLET, FILM COATED ORAL at 09:09

## 2017-09-22 NOTE — PROGRESS NOTES
09/22/2017  9:02 AM  Patient's daughter requested an [facility] MD for neurology and urology follow up appts in ; however there are no neurologists in . Patient scheduled to follow up with neurology Dr Barrow at [facility] location on 10/27/2017 at 2:20p. There is only one urology MD in  and first available appt is 11/14/2017 at 3:20pm. Patient put on wait list for cancellation. Will inform patient's daughter and NP Kendra.  Susan Campos RN,  Skilled  [id number]

## 2017-09-22 NOTE — PLAN OF CARE
Problem: Physical Therapy Goal  Goal: Physical Therapy Goal  Goals to be met by: 6 days    Patient will increase functional independence with mobility by performin. Supine to sit with supervision. Not met  2. Sit to supine with supervision. Not met  3. Sit to stand transfer with Supervision. Not met  4. Bed to chair transfer with Supervision using Rolling Walker and/or rollator. Not met  5. Gait  x 150 feet with Supervision using Rolling Walker and/or rollator. Not met   6. Ascend/descend 4 stairs with bilateral Handrails stand-by assistance. Not met  7. Ascend/Descend 4 inch curb step with Stand-by Assistance using Rolling Walker and/or rollator. Not met  8. Stand for 3 minutes with Stand-by Assistance using Rolling Walker and/or rollator. Not met  9. Lower extremity exercise program x 20 reps per handout, with independence. Not met     Outcome: Ongoing (interventions implemented as appropriate)  Goals remain appropriate.

## 2017-09-22 NOTE — PLAN OF CARE
Problem: Occupational Therapy Goal  Goal: Occupational Therapy Goal  Goals to be met by: 7 days     Patient will increase functional independence with ADLs by performing:    Feeding with Modified [employer].  UE Dressing with Modified [employer].  LE Dressing with Set-up Assistance and Supervision using AD.  Grooming while standing with Modified [employer].  Toileting from bedside commode with Supervision for hygiene and clothing management.   Bathing from  shower chair/bench with Supervision.  Toilet transfer to bedside commode with Stand-by Assistance.  Upper extremity exercise program x12 reps per handout, with supervision.     Outcome: Ongoing (interventions implemented as appropriate)  Sridhar Lawrence OTR/L      9/22/2017

## 2017-09-22 NOTE — PT/OT/SLP PROGRESS
"Physical Therapy  Treatment    Blue Cassidy   MRN: [id number]   Admitting Diagnosis: Debility    PT Received On: 09/22/17  Total Time (min): 45       Billable Minutes:  Gait [id number] and Therapeutic Exercise 30    Treatment Type: Treatment  PT/PTA: PTA     PTA Visit Number: 2       General Precautions: Standard, aspiration, fall  Orthopedic Precautions: N/A   Braces: N/A    Do you have any cultural, spiritual, Moravian conflicts, given your current situation?: none    Subjective:  "I'm fine."    Pain/Comfort  Pain Rating 1: 0/10  Pain Rating Post-Intervention 1: 0/10    Objective:  Patient found supine in bed with Patient found with: holley catheter       Functional Status:  MDS G  Bed Mobility Functional Status: S-SBA  Transfer Functional Status: S-SBA  Walk in Room Functional Status: S-SBA  Walk in Corridor Functional Status: S-SBA  Locomotion on Unit Functional Status: S-SBA          Bed Mobility:  Supine > sit in bed with SBA.  HOB elevated and used (R) bedrail    Transfers:  Sit <> stand from EOB, w/c with SBA  SPT from bed <> w/c with SBA    Gait:  Pt gait trained x 75 feet, 80 feet and 130 feet with RW and SBA safety.   Assistive Device: rolling walker  Gait Deviation(s): decreased brii;decreased toe-to-floor clearance;decreased weight-shifting ability;decreased velocity of limb motion;decreased step length;decreased stride length;decreased swing-to-stance ratio       Therex:  BLEs seated therex x 30 reps includings: APs, GS, ADD/ABD, LAQs, hip flexion, knee flexion    LBE x 15 minutes to increase strength, endurance and coordination    Patient left up in chair with call button in reach.    Assessment:  Blue Cassidy is a 71 y.o. female with a medical diagnosis of Debility.  Pt tolerated treatment session fairly. Pt remains at a SBA functional level and will continue to benefit from skilled PT services to progress with mobility. Goals remain appropriate.        Rehab identified problem " list/impairments: weakness, impaired endurance, impaired self care skills, impaired functional mobilty, gait instability, impaired balance    Rehab potential is fair.    Activity tolerance: Fair    Discharge recommendations: home health PT     Barriers to discharge: None    Equipment recommendations: bath bench     GOALS:    Physical Therapy Goals        Problem: Physical Therapy Goal    Goal Priority Disciplines Outcome Goal Variances Interventions   Physical Therapy Goal     PT/OT, PT Ongoing (interventions implemented as appropriate)     Description:  Goals to be met by: 6 days    Patient will increase functional independence with mobility by performin. Supine to sit with supervision. Not met  2. Sit to supine with supervision. Not met  3. Sit to stand transfer with Supervision. Not met  4. Bed to chair transfer with Supervision using Rolling Walker and/or rollator. Not met  5. Gait  x 150 feet with Supervision using Rolling Walker and/or rollator. Not met   6. Ascend/descend 4 stairs with bilateral Handrails stand-by assistance. Not met  7. Ascend/Descend 4 inch curb step with Stand-by Assistance using Rolling Walker and/or rollator. Not met  8. Stand for 3 minutes with Stand-by Assistance using Rolling Walker and/or rollator. Not met  9. Lower extremity exercise program x 20 reps per handout, with independence. Not met                      PLAN:    Patient to be seen  (5-6x/wk)  to address the above listed problems via gait training, therapeutic activities, therapeutic exercises, neuromuscular re-education  Plan of Care expires: 10/19/17  Plan of Care reviewed with: patient    Jeffrey EDWARDS Magdalena, PTA  2017

## 2017-09-22 NOTE — PLAN OF CARE
Problem: Patient Care Overview  Goal: Plan of Care Review  Outcome: Ongoing (interventions implemented as appropriate)  Pt a a o x3, vss, resp effort. Even and unlabored, holley in place with yellow urine noted, call bell within reach. Side rails up x 3, Pt has remained free from falls. Will continue to monitor.   09/22/17 2675   Coping/Psychosocial   Plan Of Care Reviewed With patient

## 2017-09-23 LAB
POCT GLUCOSE: 158 MG/DL (ref 70–110)
POCT GLUCOSE: 178 MG/DL (ref 70–110)
POCT GLUCOSE: 181 MG/DL (ref 70–110)
POCT GLUCOSE: 194 MG/DL (ref 70–110)

## 2017-09-23 PROCEDURE — 25000003 PHARM REV CODE 250: Performed by: NURSE PRACTITIONER

## 2017-09-23 PROCEDURE — 12000000 HC SNF SEMI-PRIVATE ROOM

## 2017-09-23 PROCEDURE — 97530 THERAPEUTIC ACTIVITIES: CPT

## 2017-09-23 PROCEDURE — 97535 SELF CARE MNGMENT TRAINING: CPT

## 2017-09-23 PROCEDURE — 63600175 PHARM REV CODE 636 W HCPCS: Performed by: INTERNAL MEDICINE

## 2017-09-23 PROCEDURE — 25000003 PHARM REV CODE 250: Performed by: HOSPITALIST

## 2017-09-23 PROCEDURE — 97110 THERAPEUTIC EXERCISES: CPT

## 2017-09-23 PROCEDURE — 25000003 PHARM REV CODE 250: Performed by: INTERNAL MEDICINE

## 2017-09-23 RX ADMIN — HYDRALAZINE HYDROCHLORIDE 75 MG: 50 TABLET ORAL at 03:09

## 2017-09-23 RX ADMIN — CIPROFLOXACIN HYDROCHLORIDE 500 MG: 500 TABLET, FILM COATED ORAL at 09:09

## 2017-09-23 RX ADMIN — CARVEDILOL 25 MG: 25 TABLET, FILM COATED ORAL at 08:09

## 2017-09-23 RX ADMIN — LOSARTAN POTASSIUM 100 MG: 50 TABLET, FILM COATED ORAL at 08:09

## 2017-09-23 RX ADMIN — HYDRALAZINE HYDROCHLORIDE 75 MG: 50 TABLET ORAL at 05:09

## 2017-09-23 RX ADMIN — STANDARDIZED SENNA CONCENTRATE AND DOCUSATE SODIUM 1 TABLET: 8.6; 5 TABLET, FILM COATED ORAL at 09:09

## 2017-09-23 RX ADMIN — STANDARDIZED SENNA CONCENTRATE AND DOCUSATE SODIUM 1 TABLET: 8.6; 5 TABLET, FILM COATED ORAL at 08:09

## 2017-09-23 RX ADMIN — ASPIRIN 325 MG ORAL TABLET 325 MG: 325 PILL ORAL at 08:09

## 2017-09-23 RX ADMIN — CIPROFLOXACIN HYDROCHLORIDE 500 MG: 500 TABLET, FILM COATED ORAL at 08:09

## 2017-09-23 RX ADMIN — FUROSEMIDE 40 MG: 40 TABLET ORAL at 08:09

## 2017-09-23 RX ADMIN — ENOXAPARIN SODIUM 40 MG: 100 INJECTION SUBCUTANEOUS at 05:09

## 2017-09-23 RX ADMIN — FUROSEMIDE 40 MG: 40 TABLET ORAL at 09:09

## 2017-09-23 RX ADMIN — LEVOTHYROXINE SODIUM 50 MCG: 50 TABLET ORAL at 05:09

## 2017-09-23 RX ADMIN — TAMSULOSIN HYDROCHLORIDE 0.4 MG: 0.4 CAPSULE ORAL at 08:09

## 2017-09-23 RX ADMIN — HYDRALAZINE HYDROCHLORIDE 75 MG: 50 TABLET ORAL at 09:09

## 2017-09-23 RX ADMIN — POTASSIUM CHLORIDE 10 MEQ: 750 CAPSULE, EXTENDED RELEASE ORAL at 08:09

## 2017-09-23 NOTE — PROGRESS NOTES
Informed per physical therapist that holley bag was leaking, assesed per this nurse. New holley bag applied using sterile technique. Holley tube secured to lt thigh with adhesive device.

## 2017-09-23 NOTE — TREATMENT PLAN
Rehab Services' DME recommendations    Blue Cassidy  MRN: [id number]    [x] Walker Wide/Heavy duty,  [place]    Wheels Yes    [x] Wheelchair  Number of hours up in a wheelchair per day 3-4 hours    Style Light weight    Seat Width 22    Seat Depth 20    Back Height Standard    Leg Support Swing Away    Arm Height Full    Cushion Basic    Justification for Cushion Prevent skin breakdown.    Justification for wheelchair order: (Please select all that apply) The patient requires the use of a wheelchair for ADLs within the home      [x] 3 in 1 commode Wide/Heavy Duty     [x] Hip Kit:  Regular     [x] Tub bench Standard (unpadded)    [x] Home health PT and OT    Vivian Enamorado, PT 9/23/2017   (Addended by Nory Strong, LOTR 09/23/17 - added hip kit)

## 2017-09-23 NOTE — PT/OT/SLP PROGRESS
"Physical Therapy  Treatment    Blue Cassidy   MRN: [id number]   Admitting Diagnosis: Debility    PT Received On: 09/23/17  Total Time (min): 40       Billable Minutes:  Therapeutic Activity 30 and Therapeutic Exercise 10    Treatment Type: Treatment  PT/PTA: PT     PTA Visit Number: 0       General Precautions: Standard, aspiration, fall, NPO  Orthopedic Precautions: N/A   Braces: N/A    Do you have any cultural, spiritual, Orthodoxy conflicts, given your current situation?: none    Subjective:  Communicated with pt prior to session.  "When do I get to take this out"- referring to holley catheter.    Pain/Comfort  Pain Rating 1: 0/10    Objective:  Patient found seated in wheelchair with Patient found with: holley catheter. Nurse notified that catheter was leaking from the collection bag.       Functional Status:  MDS G  Transfer Functional Status: S-SBA  Walk in Room Functional Status: S-SBA  Walk in Corridor Functional Status: S-SBA  Locomotion on Unit Functional Status: S-SBA  Walking (with assistive device if used): Not steady, but able to stabilize without staff assistance  Turning around and facing the opposite direction while walking: Not steady, but able to stabilize without staff assistance  Surface-to-surface transfer (transfer between bed and chair or wheelchair): Not steady, but able to stabilize without staff assistance    MDS GG  Sit to Stand Performance: Supervision or touching assistance.  Chair/bed-to-chair transfer Performance: Supervision or touching assistance.  Does the resident walk?: Yes --> Continue to Walk 50 feet with two turns assessment  Walk 50 feet with two turns Performance: Supervision or touching assistance.  Walk 150 feet Performance: Not attempted due to medical condition or safety concerns  Does the resident use a wheelchair/scooter?: Yes --> Continue to Wheel 50 feet with two turns assessment  Wheel 50 feet with two turns Performance: Supervision or touching " assistance.  Indicate the type of wheelchair/scooter used: Manual  Wheel 150 feet Performance: Not attempted due to medical condition or safety concerns   Transfers:  Sit<>Stand: supervision from wheelchair    Gait:  Amb 75 feet x 2 trials with a seated rest break in between trials with use of a rolling walker, SBA     Wheelchair Mobility:  Patient propels w/c 50 feet with minimal assistance and instruction on how to turn.     Therex:  Seated- hip flexion, long arc quads x 25 reps BLE    Balance:  Pt was able to stand for 3 minutes statically using a rolling walker with verbal cues to stand upright (with proper posture).    Additional Treatment:  Pt completed 15 minutes on the LBE to improve her LE strength.    Patient left up in chair with call button in reach.     Assessment:  Blue Cassidy is a 71 y.o. female with a medical diagnosis of Debility.  Ms. Cassidy met a goal today, denoting improvement in her ability to perform transfers.  She still is limited by impaired endurance, but she will be ready to discharge home on  with assistance from her family. She will need to utilize a rolling walker for transfers and gait upon return home.    Rehab identified problem list/impairments: weakness, impaired endurance, impaired self care skills, impaired functional mobilty, gait instability, impaired balance    Rehab potential is good.    Activity tolerance: Good    Discharge recommendations: home health PT     Barriers to discharge: None    Equipment recommendations: bath bench     GOALS:    Physical Therapy Goals        Problem: Physical Therapy Goal    Goal Priority Disciplines Outcome Goal Variances Interventions   Physical Therapy Goal     PT/OT, PT Ongoing (interventions implemented as appropriate)     Description:  Goals to be met by: 6 days    Patient will increase functional independence with mobility by performin. Supine to sit with supervision. Not met  2. Sit to supine with supervision. Not  met  3. Sit to stand transfer with Supervision. Met (9/23/2017)  4. Bed to chair transfer with Supervision using Rolling Walker and/or rollator. Not met  5. Gait  x 150 feet with Supervision using Rolling Walker and/or rollator. Not met   6. Ascend/descend 4 stairs with bilateral Handrails stand-by assistance. Not met  7. Ascend/Descend 4 inch curb step with Stand-by Assistance using Rolling Walker and/or rollator. Not met  8. Stand for 3 minutes with Stand-by Assistance using Rolling Walker and/or rollator. Not met  9. Lower extremity exercise program x 20 reps per handout, with independence. Not met                       PLAN:    Patient to be seen  (5-6x/wk)  to address the above listed problems via gait training, therapeutic activities, therapeutic exercises, neuromuscular re-education  Plan of Care expires: 10/19/17  Plan of Care reviewed with: patient    Vivian J Fei, PT  09/23/2017

## 2017-09-23 NOTE — PT/OT/SLP PROGRESS
"Occupational Therapy  Treatment    Blue Cassidy   MRN: [id number]   Admitting Diagnosis: Debility    OT Date of Treatment: 09/23/17  Total Time (min): 45 min    Billable Minutes:  Self Care/Home Management 12 and Therapeutic Exercise 33    General Precautions: Standard, aspiration, fall, NPO  Orthopedic Precautions: N/A  Braces: N/A         Subjective:  Communicated with nurse prior to session.  "I'm going home Monday."    Pain/Comfort  Pain Rating 1: 0/10  Pain Rating Post-Intervention 1: 0/10    Objective:  Patient found with: holley catheter    Functional Status:  MDS G  Transfer Functional Status: S-SBA  Transfer Level of (A):  (sit to stand = SBA with RW)    Eating = MOd I due to use of dentures    Grooming = Mod I to wash hands and oral care in sitting    Moving from seated to standing position: Not steady, but able to stabilize without staff assistance    MDS GG  Eating Performance: Independent.  Oral Hygiene Performance: Independent.  Toileting Hygiene Performance: Dependent.  Sit to Stand Performance: Supervision or touching assistance.     OT Exercises: AROM using 2# dowel with supervision, pt completed 3 sets of 12 reps of shoulder press overhead, biceps curls and shoulder hor abd/add -  UE Ergometer 15 minutes with mod resistance in forward motion    Additional Treatment:  · Pt completed ADLs and func mobility activities for tx session as noted above  · Pt declined standing with OT due to fatigue  · Reviewed DME needs for discharge with pt - DME requests completed by PT but OT updated list by added hip kit for LE dressing  · Pt educated on role of OT and POC      Patient left up in chair with call button in reach    ASSESSMENT:  Blue Cassidy is a 71 y.o. female with a medical diagnosis of Debility.  Pt was agreeable to OT and was noted to make progress towards her goals in therapy. During today's session, pt met goals for Eating and UE exercises.  Pt's goals remain appropriate at this time. She is " scheduled for discharge home with S on Monday.  DME list finalized/updated with pt.   She will continue to benefit from skilled OT services in order to assist her with increasing her safety and level of independence with self care and mobility tasks.         Rehab identified problem list/impairments: weakness, impaired endurance, impaired self care skills, impaired functional mobilty, gait instability, impaired balance, decreased coordination, decreased lower extremity function, decreased safety awareness, impaired coordination, edema    Rehab potential is good    Activity tolerance: Fair    Discharge recommendations: home health OT     Barriers to discharge: None     Equipment recommendations: bath bench     GOALS:    Occupational Therapy Goals        Problem: Occupational Therapy Goal    Goal Priority Disciplines Outcome Interventions   Occupational Therapy Goal     OT, PT/OT Ongoing (interventions implemented as appropriate)    Description:  Goals to be met by: 7 days     Patient will increase functional independence with ADLs by performing:    Feeding with Modified [place]. GOAL MET  UE Dressing with Modified [place].  LE Dressing with Set-up Assistance and Supervision using AD.  Grooming while standing with Modified [place].  Toileting from bedside commode with Supervision for hygiene and clothing management.   Bathing from  shower chair/bench with Supervision.  Toilet transfer to bedside commode with Stand-by Assistance.  Upper extremity exercise program x12 reps per handout, with supervision. GOAL MET                       Plan:  Patient to be seen  (5-6x/week) to address the above listed problems via self-care/home management, therapeutic activities, therapeutic exercises  Plan of Care expires: 10/19/17  Plan of Care reviewed with: patient    Nory Nerion, OT  09/23/2017

## 2017-09-23 NOTE — PLAN OF CARE
Problem: Physical Therapy Goal  Goal: Physical Therapy Goal  Goals to be met by: 6 days    Patient will increase functional independence with mobility by performin. Supine to sit with supervision. Not met  2. Sit to supine with supervision. Not met  3. Sit to stand transfer with Supervision. Met (2017)  4. Bed to chair transfer with Supervision using Rolling Walker and/or rollator. Not met  5. Gait  x 150 feet with Supervision using Rolling Walker and/or rollator. Not met   6. Ascend/descend 4 stairs with bilateral Handrails stand-by assistance. Not met  7. Ascend/Descend 4 inch curb step with Stand-by Assistance using Rolling Walker and/or rollator. Not met  8. Stand for 3 minutes with Stand-by Assistance using Rolling Walker and/or rollator. Not met  9. Lower extremity exercise program x 20 reps per handout, with independence. Not met     Outcome: Ongoing (interventions implemented as appropriate)  Met one goal today.

## 2017-09-23 NOTE — PLAN OF CARE
Problem: Occupational Therapy Goal  Goal: Occupational Therapy Goal  Goals to be met by: 7 days     Patient will increase functional independence with ADLs by performing:    Feeding with Modified [place]. GOAL MET  UE Dressing with Modified [place].  LE Dressing with Set-up Assistance and Supervision using AD.  Grooming while standing with Modified [place].  Toileting from bedside commode with Supervision for hygiene and clothing management.   Bathing from  shower chair/bench with Supervision.  Toilet transfer to bedside commode with Stand-by Assistance.  Upper extremity exercise program x12 reps per handout, with supervision. GOAL MET     Outcome: Ongoing (interventions implemented as appropriate)    Pt was agreeable to OT and was noted to make progress towards her goals in therapy. During today's session, pt met goals for Eating and UE exercises.  Pt's goals remain appropriate at this time.  She will continue to benefit from skilled OT services in order to assist her with increasing her safety and level of independence with self care and mobility tasks.     Nory Strong, OT  9/23/2017

## 2017-09-24 LAB
POCT GLUCOSE: 143 MG/DL (ref 70–110)
POCT GLUCOSE: 176 MG/DL (ref 70–110)
POCT GLUCOSE: 177 MG/DL (ref 70–110)
POCT GLUCOSE: 209 MG/DL (ref 70–110)

## 2017-09-24 PROCEDURE — 25000003 PHARM REV CODE 250: Performed by: NURSE PRACTITIONER

## 2017-09-24 PROCEDURE — 63600175 PHARM REV CODE 636 W HCPCS: Performed by: INTERNAL MEDICINE

## 2017-09-24 PROCEDURE — 12000000 HC SNF SEMI-PRIVATE ROOM

## 2017-09-24 PROCEDURE — 25000003 PHARM REV CODE 250: Performed by: INTERNAL MEDICINE

## 2017-09-24 PROCEDURE — 25000003 PHARM REV CODE 250: Performed by: HOSPITALIST

## 2017-09-24 RX ADMIN — STANDARDIZED SENNA CONCENTRATE AND DOCUSATE SODIUM 1 TABLET: 8.6; 5 TABLET, FILM COATED ORAL at 09:09

## 2017-09-24 RX ADMIN — TAMSULOSIN HYDROCHLORIDE 0.4 MG: 0.4 CAPSULE ORAL at 08:09

## 2017-09-24 RX ADMIN — ASPIRIN 325 MG ORAL TABLET 325 MG: 325 PILL ORAL at 08:09

## 2017-09-24 RX ADMIN — HYDRALAZINE HYDROCHLORIDE 75 MG: 50 TABLET ORAL at 09:09

## 2017-09-24 RX ADMIN — LOSARTAN POTASSIUM 100 MG: 50 TABLET, FILM COATED ORAL at 08:09

## 2017-09-24 RX ADMIN — CARVEDILOL 25 MG: 25 TABLET, FILM COATED ORAL at 09:09

## 2017-09-24 RX ADMIN — HYDRALAZINE HYDROCHLORIDE 75 MG: 50 TABLET ORAL at 05:09

## 2017-09-24 RX ADMIN — FUROSEMIDE 40 MG: 40 TABLET ORAL at 09:09

## 2017-09-24 RX ADMIN — FUROSEMIDE 40 MG: 40 TABLET ORAL at 08:09

## 2017-09-24 RX ADMIN — POTASSIUM CHLORIDE 10 MEQ: 750 CAPSULE, EXTENDED RELEASE ORAL at 08:09

## 2017-09-24 RX ADMIN — CIPROFLOXACIN HYDROCHLORIDE 500 MG: 500 TABLET, FILM COATED ORAL at 09:09

## 2017-09-24 RX ADMIN — ENOXAPARIN SODIUM 40 MG: 100 INJECTION SUBCUTANEOUS at 04:09

## 2017-09-24 RX ADMIN — LEVOTHYROXINE SODIUM 50 MCG: 50 TABLET ORAL at 05:09

## 2017-09-24 RX ADMIN — CARVEDILOL 25 MG: 25 TABLET, FILM COATED ORAL at 08:09

## 2017-09-24 RX ADMIN — CIPROFLOXACIN HYDROCHLORIDE 500 MG: 500 TABLET, FILM COATED ORAL at 08:09

## 2017-09-24 RX ADMIN — STANDARDIZED SENNA CONCENTRATE AND DOCUSATE SODIUM 1 TABLET: 8.6; 5 TABLET, FILM COATED ORAL at 08:09

## 2017-09-24 RX ADMIN — INSULIN ASPART 1 UNITS: 100 INJECTION, SOLUTION INTRAVENOUS; SUBCUTANEOUS at 09:09

## 2017-09-24 NOTE — DISCHARGE SUMMARY
"[facility] Hospital Medicine  Discharge Summary      Patient Name: Blue Cassidy  MRN: [id number]  Admission Date: 9/6/2017  Hospital Length of Stay: 12 days  Discharge Date and Time:  09/24/2017 3:07 PM  Attending Physician: No att. providers found   Discharging Provider: Torrey Maya MD  Primary Care Provider: Alphonso Phelan MD  [facility] Medicine Team: [facility] MED B Torrey Maya MD    HPI:   Ms. Cassidy is a 72yo lady with a past medical history of   She now comes as a transfer from [facility] after being admitted there on 9/4/17 with acute altered mental status with agitation.  She had a CT head done there at admission which revealed, "right parietal lobe ischemia."  She was also found to have a, "slightly abnormal UA" and was started on Rocephin 1g iv q24 hours.  She was admitted to the IM service and Neurology was consulted, who recommended an MRI brain.  This was done, but was of such a poor quality that is was un-interpretable.  The night after she was admitted she, "had two focal seizures.  The patient was placed on IV Keppra and an EEG was obtained this morning."  She also had to be given 2mg of iv haldol to control some combativeness.  Since receiving all of these therapies, she has been lethargic and unable to take anything by mouth.       Procedure(s) (LRB):  IMAGING-(MRI) (N/A)      Indwelling Lines/Drains at time of discharge:   Lines/Drains/Airways     Drain                 Urethral Catheter 09/10/17 0910 Double-lumen;Straight-tip 14 days              Hospital Course:   Presented to [facility] 9/4 with acute change in mental status and agitation. 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 LP on 9/8-CSF with 2 WBC, 133 RBC, 270 protein, 73 glucose. MRI Brain 9/8 with chronic microvascular ischemic changes and small area of cystic encephalomalacia in the " deep right frontal lobe c/w remote lacunar infarct.     9/11-Daughter reports mental status continues to fluctuate, but per team her mental status has improved since admission.   9/12- Has not received any Seroquel yet, awaiting 24 hr vEEG, HSV in CSF resulted negative   9/7 - pt more alert, still not back to baseline mental status accord to family,  Neurology saw pt this am,  2 am, labs collected,  EEG ordered  9/8 - spoke w anesthesia fellow yesterday,  Place another consutlt for anesthesia today for LP and MRI under sedation,  Spoke to neurology fellow and he is going to set up procedures.  9/9- up in chair, verbal and interacting with caretakers, eating a ookie by herself.  LP + protein, 270, MRI - consistent w chronic microvasc disease, and old small stroke w encephalomalasia right fromtal lobe and cerebro volume loss.   K 3.2  9/10 - developed acute urinary retention, holley placed, discussed care of holleymoshe Hall and Malgorzata, her family present.  Work excuse given to Malgorzata Whalen.  Pt still confused, still waitng on HSV and other lab. See palliative care note below  9/11 - family at bedside reports pt did get agitated but they were able to hansal it.  Will add seroquel prn    HSV was negative, no seizure activity noted. Will transfer to SNF for both chronic and acute debility of unceratin etiology.     Consults:   Consults         Status Ordering Provider     Inpatient consult to Neurology  Once     Provider:  (Not yet assigned)    Completed CATHLEEN FLOWERS          Significant Diagnostic Studies: Labs: BMP: No results for input(s): GLU, NA, K, CL, CO2, BUN, CREATININE, CALCIUM, MG in the last 48 hours.    Pending Diagnostic Studies:     Procedure Component Value Units Date/Time    Troponin I [[id number]] Collected:  09/07/17 2325    Order Status:  Sent Lab Status:  In process Updated:  09/07/17 2325    Specimen:  Blood from Blood         Final Active Diagnoses:    Diagnosis Date Noted POA    PRINCIPAL  PROBLEM:  Acute encephalopathy [G93.40] 09/07/2017 Yes    Acute urinary retention [R33.8] 09/10/2017 Yes    Palliative care encounter [Z51.5] 09/10/2017 Not Applicable    CHF (congestive heart failure) [I50.9] 09/08/2017 No    Hypokalemia [E87.6] 09/08/2017 No    Seizure [R56.9] 09/07/2017 Yes    Type 2 diabetes mellitus with complication [E11.8] 09/07/2017 Yes    HTN (hypertension), malignant [I10] 09/07/2017 Yes    Delirium [R41.0] 09/06/2017 Yes    Urinary tract infection without hematuria [N39.0] 09/06/2017 Yes      Problems Resolved During this Admission:    Diagnosis Date Noted Date Resolved POA    Abnormal CT of the head [R93.0] 09/07/2017 09/17/2017 Yes      * Acute encephalopathy    9/11 - waiting on HSV,if positive will need 8 weeks of IV acyclovir,   will transfer to rehab when accepted,  F/u neurology for rest of results and viral studies. Add prn sekadeem hermosillo HS for agitation.   9/10 -  Discussed nature of brain disorder w Malgorzata and Isabel, who are asking questions.  Pt has acute encephalitis, which may improve,has evidence of mild dementia and evidence of old stroke per MRI, and active delerium presently, which may also improve.  Discussed cardiac effects of anti-psychotic agents and would use them only if behavior can't be controlled.  They intend on taking her home. MS likely to improve in a stable environment.   24 hour EEG ordered.  9/9 - LP results pending, + protein suggest possible viral etiology  9/8- atempting to set up LP and MRI under sedation with anesthesia    HSV negative, unceetain etiology of encephalapthy    LP is WNL, HSV is neg, will stop acyclovir        Delirium    Suspect seizure with viral encephalitis  keppra started  HSV and other panels neagtive                Discharged Condition: good    Disposition: Skilled Nursing Facility    Follow Up:    Patient Instructions:   No discharge procedures on file.  Medications:  Reconciled Home Medications:   Discharge Medication  List as of 9/18/2017  3:01 PM      CONTINUE these medications which have NOT CHANGED    Details   albuterol (VENTOLIN HFA) 90 mcg/actuation inhaler Inhale 2 puffs into the lungs every 6 (six) hours as needed for Wheezing. Rescue, Historical Med      allopurinol (ZYLOPRIM) 100 MG tablet Take 100 mg by mouth 3 (three) times daily., Historical Med      baclofen (LIORESAL) 10 MG tablet Take 10 mg by mouth 3 (three) times daily., Historical Med      CARVEDILOL PHOSPHATE 20 MG ORAL CM24 (COREG CR) 20 mg 24 hr capsule Take 20 mg by mouth 2 (two) times daily., Historical Med      furosemide (LASIX) 80 MG tablet Take 80 mg by mouth 2 (two) times daily., Historical Med      glipiZIDE (GLUCOTROL) 5 MG TR24 Take 5 mg by mouth daily with breakfast., Historical Med      levothyroxine (SYNTHROID) 100 MCG tablet Take 100 mcg by mouth once daily., Historical Med      losartan (COZAAR) 100 MG tablet Take 100 mg by mouth once daily., Historical Med      omeprazole (PRILOSEC) 20 MG capsule Take 20 mg by mouth once daily., Historical Med      potassium chloride SA (K-DUR,KLOR-CON) 10 MEQ tablet Take 10 mEq by mouth once daily., Historical Med           Time spent on the discharge of patient: 30 minutes      Torrey Maya MD  Department of Hospital Medicine  [facility]

## 2017-09-24 NOTE — ASSESSMENT & PLAN NOTE
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 Add prn seoquel q HS for agitation.   9/10 -  Discussed nature of brain disorder w Malgorzata and Isabel, who are asking questions.  Pt has acute encephalitis, which may improve,has evidence of mild dementia and evidence of old stroke per MRI, and active delerium presently, which may also improve.  Discussed cardiac effects of anti-psychotic agents and would use them only if behavior can't be controlled.  They intend on taking her home. MS likely to improve in a stable environment.   24 hour EEG ordered.  9/9 - LP results pending, + protein suggest possible viral etiology  9/8- atempting to set up LP and MRI under sedation with anesthesia    HSV negative, unceetain etiology of encephalapthy    LP is WNL, HSV is neg, will stop acyclovir

## 2017-09-25 VITALS
HEIGHT: 64 IN | SYSTOLIC BLOOD PRESSURE: 108 MMHG | RESPIRATION RATE: 18 BRPM | OXYGEN SATURATION: 96 % | TEMPERATURE: 98 F | WEIGHT: 293 LBS | BODY MASS INDEX: 50.02 KG/M2 | DIASTOLIC BLOOD PRESSURE: 70 MMHG | HEART RATE: 58 BPM

## 2017-09-25 LAB
ANION GAP SERPL CALC-SCNC: 9 MMOL/L
BASOPHILS # BLD AUTO: 0.02 K/UL
BASOPHILS NFR BLD: 0.5 %
BUN SERPL-MCNC: 16 MG/DL
CALCIUM SERPL-MCNC: 9.2 MG/DL
CHLORIDE SERPL-SCNC: 98 MMOL/L
CO2 SERPL-SCNC: 33 MMOL/L
CREAT SERPL-MCNC: 0.9 MG/DL
DIFFERENTIAL METHOD: ABNORMAL
EOSINOPHIL # BLD AUTO: 0.1 K/UL
EOSINOPHIL NFR BLD: 2.9 %
ERYTHROCYTE [DISTWIDTH] IN BLOOD BY AUTOMATED COUNT: 14.8 %
EST. GFR  (AFRICAN AMERICAN): >60 ML/MIN/1.73 M^2
EST. GFR  (NON AFRICAN AMERICAN): >60 ML/MIN/1.73 M^2
GLUCOSE SERPL-MCNC: 130 MG/DL
HCT VFR BLD AUTO: 38.3 %
HGB BLD-MCNC: 11.6 G/DL
LYMPHOCYTES # BLD AUTO: 1.5 K/UL
LYMPHOCYTES NFR BLD: 37.7 %
MAGNESIUM SERPL-MCNC: 1.5 MG/DL
MCH RBC QN AUTO: 28 PG
MCHC RBC AUTO-ENTMCNC: 30.3 G/DL
MCV RBC AUTO: 92 FL
MONOCYTES # BLD AUTO: 0.6 K/UL
MONOCYTES NFR BLD: 13.7 %
NEUTROPHILS # BLD AUTO: 1.9 K/UL
NEUTROPHILS NFR BLD: 45.2 %
PHOSPHATE SERPL-MCNC: 3.9 MG/DL
PLATELET # BLD AUTO: 191 K/UL
PMV BLD AUTO: 11.7 FL
POCT GLUCOSE: 171 MG/DL (ref 70–110)
POCT GLUCOSE: 193 MG/DL (ref 70–110)
POTASSIUM SERPL-SCNC: 3.6 MMOL/L
RBC # BLD AUTO: 4.15 M/UL
SODIUM SERPL-SCNC: 140 MMOL/L
WBC # BLD AUTO: 4.09 K/UL

## 2017-09-25 PROCEDURE — 97110 THERAPEUTIC EXERCISES: CPT

## 2017-09-25 PROCEDURE — 25000003 PHARM REV CODE 250: Performed by: HOSPITALIST

## 2017-09-25 PROCEDURE — 99900058 HC 022 PAID UNDER SNF PPS

## 2017-09-25 PROCEDURE — 25000003 PHARM REV CODE 250: Performed by: INTERNAL MEDICINE

## 2017-09-25 PROCEDURE — 83735 ASSAY OF MAGNESIUM: CPT

## 2017-09-25 PROCEDURE — 85025 COMPLETE CBC W/AUTO DIFF WBC: CPT

## 2017-09-25 PROCEDURE — 80048 BASIC METABOLIC PNL TOTAL CA: CPT

## 2017-09-25 PROCEDURE — 84100 ASSAY OF PHOSPHORUS: CPT

## 2017-09-25 PROCEDURE — 99316 NF DSCHRG MGMT 30 MIN+: CPT | Mod: ,,, | Performed by: HOSPITALIST

## 2017-09-25 PROCEDURE — 36415 COLL VENOUS BLD VENIPUNCTURE: CPT

## 2017-09-25 PROCEDURE — 97116 GAIT TRAINING THERAPY: CPT

## 2017-09-25 PROCEDURE — 25000003 PHARM REV CODE 250: Performed by: NURSE PRACTITIONER

## 2017-09-25 RX ORDER — LEVOTHYROXINE SODIUM 50 UG/1
50 TABLET ORAL DAILY
Qty: 30 TABLET | Refills: 1 | Status: SHIPPED | OUTPATIENT
Start: 2017-09-25

## 2017-09-25 RX ORDER — LANOLIN ALCOHOL/MO/W.PET/CERES
400 CREAM (GRAM) TOPICAL 2 TIMES DAILY
Refills: 0 | COMMUNITY
Start: 2017-09-25 | End: 2017-09-27

## 2017-09-25 RX ORDER — HYDRALAZINE HYDROCHLORIDE 25 MG/1
50 TABLET, FILM COATED ORAL EVERY 8 HOURS
Qty: 90 TABLET | Refills: 1 | Status: SHIPPED | OUTPATIENT
Start: 2017-09-25 | End: 2019-09-12

## 2017-09-25 RX ORDER — ASPIRIN 325 MG
325 TABLET ORAL DAILY
Refills: 0 | COMMUNITY
Start: 2017-09-25 | End: 2019-09-12

## 2017-09-25 RX ORDER — NITROFURANTOIN 25; 75 MG/1; MG/1
100 CAPSULE ORAL EVERY 12 HOURS
Status: DISCONTINUED | OUTPATIENT
Start: 2017-09-25 | End: 2017-09-25 | Stop reason: HOSPADM

## 2017-09-25 RX ORDER — LANOLIN ALCOHOL/MO/W.PET/CERES
400 CREAM (GRAM) TOPICAL 2 TIMES DAILY
Status: DISCONTINUED | OUTPATIENT
Start: 2017-09-25 | End: 2017-09-25 | Stop reason: HOSPADM

## 2017-09-25 RX ORDER — FUROSEMIDE 40 MG/1
40 TABLET ORAL 2 TIMES DAILY
Qty: 60 TABLET | Refills: 1 | Status: SHIPPED | OUTPATIENT
Start: 2017-09-25

## 2017-09-25 RX ORDER — NITROFURANTOIN 25; 75 MG/1; MG/1
100 CAPSULE ORAL EVERY 12 HOURS
Qty: 20 CAPSULE | Refills: 0 | Status: SHIPPED | OUTPATIENT
Start: 2017-09-25 | End: 2017-10-05

## 2017-09-25 RX ORDER — HYDRALAZINE HYDROCHLORIDE 50 MG/1
50 TABLET, FILM COATED ORAL EVERY 8 HOURS
Status: DISCONTINUED | OUTPATIENT
Start: 2017-09-25 | End: 2017-09-25 | Stop reason: HOSPADM

## 2017-09-25 RX ORDER — HYDRALAZINE HYDROCHLORIDE 25 MG/1
75 TABLET, FILM COATED ORAL EVERY 8 HOURS
Qty: 270 TABLET | Refills: 1 | Status: SHIPPED | OUTPATIENT
Start: 2017-09-25 | End: 2017-09-25

## 2017-09-25 RX ADMIN — TAMSULOSIN HYDROCHLORIDE 0.4 MG: 0.4 CAPSULE ORAL at 10:09

## 2017-09-25 RX ADMIN — POTASSIUM CHLORIDE 10 MEQ: 750 CAPSULE, EXTENDED RELEASE ORAL at 10:09

## 2017-09-25 RX ADMIN — ASPIRIN 325 MG ORAL TABLET 325 MG: 325 PILL ORAL at 10:09

## 2017-09-25 RX ADMIN — FUROSEMIDE 40 MG: 40 TABLET ORAL at 10:09

## 2017-09-25 RX ADMIN — STANDARDIZED SENNA CONCENTRATE AND DOCUSATE SODIUM 1 TABLET: 8.6; 5 TABLET, FILM COATED ORAL at 10:09

## 2017-09-25 RX ADMIN — LEVOTHYROXINE SODIUM 50 MCG: 50 TABLET ORAL at 06:09

## 2017-09-25 RX ADMIN — NITROFURANTOIN (MONOHYDRATE/MACROCRYSTALS) 100 MG: 75; 25 CAPSULE ORAL at 10:09

## 2017-09-25 RX ADMIN — CIPROFLOXACIN HYDROCHLORIDE 500 MG: 500 TABLET, FILM COATED ORAL at 10:09

## 2017-09-25 RX ADMIN — Medication 400 MG: at 10:09

## 2017-09-25 NOTE — PT/OT/SLP PROGRESS
Occupational Therapy      Blue Cassidy  MRN: [id number]    Patient not seen today secondary to patient refusing OT and just wanted to go home  . Patient is planned to be discharged from SNF on this date.    KAUR Mccormick  9/25/2017

## 2017-09-25 NOTE — PROGRESS NOTES
Pt. d/c to home accompanied by daughters.d/c instructions with f/u care explain to pt.and.copy of d/c sheet given pt. escorted to front entrance and assisted into family vehicle.pt.personal belongings accompanied pt.

## 2017-09-25 NOTE — PLAN OF CARE
[facility] HOME HEALTH ORDERS  FACE TO FACE ENCOUNTER    Patient Name: Blue Cassidy  YOB: 1945    PCP: Alphonso Phelan MD   PCP Address: [address] [place] [address]  PCP Phone Number: [phone]  PCP Fax: [phone]    Encounter Date: 09/25/2017    Admit to Home Health    Diagnoses:  Active Hospital Problems    Diagnosis  POA    *Debility [R53.81]  Yes    Acquired hypothyroidism [E03.9]  Yes    Chronic obstructive pulmonary disease [J44.9]  Yes    Acute urinary retention [R33.8]  Yes    CHF (congestive heart failure) [I50.9]  Yes    Hypokalemia [E87.6]  Yes    Acute encephalopathy [G93.40]  Yes    Type 2 diabetes mellitus with complication [E11.8]  Yes    HTN (hypertension), malignant [I10]  Yes    Cerebrovascular accident (CVA) [I63.9]  Yes      Resolved Hospital Problems    Diagnosis Date Resolved POA   No resolved problems to display.       Future Appointments  Date Time Provider Department Center   10/27/2017 2:20 PM Dave Barrow MD [facility] NEURO [facility]   11/14/2017 3:20 PM Alphonso Epps IV, MD ON UROLOGY [facility]     Follow-up Information     Alphonso Phelan MD. Go on 10/4/2017.    Specialty:  Family Medicine  Why:  hospital follow up at 10am  Contact information:  [address] [place] [place] [address] [phone]             Dave Barrow MD. Go on 10/27/2017.    Specialty:  Neurology  Why:  Neurology follow-up  Contact information:  [address]  [place] [address]  [phone]             Alphonso Epps IV, MD. Go on 11/14/2017.    Specialty:  Urology  Why:  Urology follow-up  Contact information:  [address]  [facility] [id number] [phone]               I have seen and examined this patient face to face today. My clinical findings that support the need for the home health skilled services and home bound status are the following:  Weakness/numbness causing balance and gait disturbance  due to Weakness/Debility making it taxing to leave home.    Allergies:Review of patient's allergies indicates:  No Known Allergies    Diet: cardiac diet and diabetic diet: 2000 calorie    Activities: activity as tolerated and no lifting, Driving, or Strenuous exercise     Nursing:   SN to complete comprehensive assessment including routine vital signs. Instruct on disease process and s/s of complications to report to MD. Review/verify medication list sent home with the patient at time of discharge  and instruct patient/caregiver as needed. Frequency may be adjusted depending on start of care date.    Notify MD if SBP > 160 or < 90; DBP > 90 or < 50; HR > 120 or < 50; Temp > 101      CONSULTS:    Physical Therapy to evaluate and treat. Evaluate for home safety and equipment needs; Establish/upgrade home exercise program. Perform / instruct on therapeutic exercises, gait training, transfer training, and Range of Motion.  Occupational Therapy to evaluate and treat. Evaluate home environment for safety and equipment needs. Perform/Instruct on transfers, ADL training, ROM, and therapeutic exercises.  Aide to provide assistance with personal care, ADLs, and vital signs.    MISCELLANEOUS CARE:  Jones Care: Instruct patient/caregiver to empty Jones bag.  Change Jones every month.  Diabetic Care:   SN to perform and educate Diabetic management with blood glucose monitoring:, Fingerstick blood sugar AC and HS and Report CBG < 60 or > 350 to physician.  Heart Failure:      SN to instruct on the following:    Instruct on the definition of CHF.   Instruct on the signs/sympoms of CHF to be reported.   Instruct on and monitor daily weights.   Instruct on factors that cause exacerbation.   Instruct on action, dose, schedule, and side effects of medications.   Instruct on diet as prescribed.   Instruct on activity allowed.   Instruct on life-style modifications for life long management of CHF   SN to assess compliance with daily  weights, diet, medications, fluid retention,    safety precautions, activities permitted and life-style modifications.   Additional 1-2 SN visits per week as needed for signs and symptoms     of CHF exacerbation.      For Weight Gain > 2-3 lbs in 1 day or 4-6 lbs over 1 week notify PCP:      WOUND CARE ORDERSn/a      Medications: Review discharge medications with patient and family and provide education.      Current Discharge Medication List      START taking these medications    Details   aspirin 325 MG tablet Take 1 tablet (325 mg total) by mouth once daily.  Refills: 0      hydrALAZINE (APRESOLINE) 25 MG tablet Take 3 tablets (75 mg total) by mouth every 8 (eight) hours.  Qty: 270 tablet, Refills: 1      magnesium oxide (MAG-OX) 400 mg tablet Take 1 tablet (400 mg total) by mouth 2 (two) times daily.  Refills: 0      nitrofurantoin, macrocrystal-monohydrate, (MACROBID) 100 MG capsule Take 1 capsule (100 mg total) by mouth every 12 (twelve) hours.  Qty: 20 capsule, Refills: 0         CONTINUE these medications which have CHANGED    Details   furosemide (LASIX) 40 MG tablet Take 1 tablet (40 mg total) by mouth 2 (two) times daily.  Qty: 60 tablet, Refills: 1      levothyroxine (SYNTHROID) 50 MCG tablet Take 1 tablet (50 mcg total) by mouth once daily.  Qty: 30 tablet, Refills: 1         CONTINUE these medications which have NOT CHANGED    Details   albuterol (VENTOLIN HFA) 90 mcg/actuation inhaler Inhale 2 puffs into the lungs every 6 (six) hours as needed for Wheezing. Rescue      allopurinol (ZYLOPRIM) 100 MG tablet Take 100 mg by mouth 3 (three) times daily.      baclofen (LIORESAL) 10 MG tablet Take 10 mg by mouth 3 (three) times daily.      carvedilol (COREG) 25 MG tablet Take 25 mg by mouth 2 (two) times daily with meals.      glipiZIDE (GLUCOTROL) 5 MG TR24 Take 5 mg by mouth daily with breakfast.      losartan (COZAAR) 100 MG tablet Take 100 mg by mouth once daily.      omeprazole (PRILOSEC) 20 MG capsule  Take 20 mg by mouth once daily.      potassium chloride SA (K-DUR,KLOR-CON) 10 MEQ tablet Take 10 mEq by mouth once daily.         STOP taking these medications       CARVEDILOL PHOSPHATE 20 MG ORAL CM24 (COREG CR) 20 mg 24 hr capsule Comments:   Reason for Stopping:               I certify that this patient is confined to her home and needs intermittent skilled nursing care, physical therapy and occupational therapy.

## 2017-09-25 NOTE — PROGRESS NOTES
Patient is discharging today.  Home Health referral sent to [facility] via [employer].  [facility] called to inform that the pt has Medicare part A only and that does not cover any equipment ordered. Family and pt were given information on resources that could possibly assist with equipment purchase or rentals.  SW informed pt and family. Pt has bedside commode and a rollator that she uses at home.  SW will continue to monitor until pt is discharged.

## 2017-09-25 NOTE — PLAN OF CARE
Problem: Physical Therapy Goal  Goal: Physical Therapy Goal  Goals to be met by: 6 days    Patient will increase functional independence with mobility by performin. Supine to sit with supervision. Not met  2. Sit to supine with supervision. Not met  3. Sit to stand transfer with Supervision. Met (2017)  4. Bed to chair transfer with Supervision using Rolling Walker and/or rollator. Not met  5. Gait  x 150 feet with Supervision using Rolling Walker and/or rollator. Not met   6. Ascend/descend 4 stairs with bilateral Handrails stand-by assistance. Not met  7. Ascend/Descend 4 inch curb step with Stand-by Assistance using Rolling Walker and/or rollator. Not met  8. Stand for 3 minutes with Stand-by Assistance using Rolling Walker and/or rollator. Not met  9. Lower extremity exercise program x 20 reps per handout, with independence. Not met      Outcome: Ongoing (interventions implemented as appropriate)  Goals remain appropriate

## 2017-09-25 NOTE — PLAN OF CARE
"09/25/2017  2:55 PM    Patient to discharge home today with assist of family. Patient set up with [employer] per LEONCIO Hannon.    Future Appointments  Date Time Provider Department Center   10/27/2017 2:20 PM Dave Barrow MD [facility] NEURO [address]   11/14/2017 3:20 PM Alphonso Epps IV, MD [facility] UROLOGY [facility]     Patient to follow up with Dr Phelan on 10/4/2017 at 10a.    Patient's discharge summary faxed to Dr Phelan's office at [phone].  Your fax has been successfully sent to [phone] at [phone].  ------------------------------------------------------------  From: [id number]  ------------------------------------------------------------  Time: 9/26/2017 8:09:20 AM  Sent to [phone] with remote ID "[email or web address]"  Result: (0/339;0/0) Successful Send  Page record: 1 - 14  Elapsed time: 06:29 on channel 43     09/25/17 8535   Final Note   Assessment Type Final Discharge Note   Discharge Disposition Home   What phone number can be called within the next 1-3 days to see how you are doing after discharge? [phone]   Hospital Follow Up  Appt(s) scheduled? Yes   Discharge plans and expectations educations in teach back method with documentation complete? Yes     Susan Campos RN, CM Skilled  [id number]           "

## 2017-09-25 NOTE — PT/OT/SLP PROGRESS
"Physical Therapy  Treatment    Blue Cassidy   MRN: [id number]   Admitting Diagnosis: Debility    PT Received On: 09/25/17  Total Time (min): 45       Billable Minutes:  Gait [id number] and Therapeutic Exercise 30    Treatment Type: Treatment  PT/PTA: PTA     PTA Visit Number: 1       General Precautions: Standard, aspiration, fall, NPO  Orthopedic Precautions: N/A   Braces: N/A    Do you have any cultural, spiritual, Christian conflicts, given your current situation?: none    Subjective:  "okay.  I like to look out the window."    Pain/Comfort  Pain Rating 1: 0/10  Pain Rating Post-Intervention 1: 0/10    Objective:  Patient found supine in bed with Patient found with: holley catheter       Functional Status:  MDS G  Bed Mobility Functional Status: S-SBA  Transfer Functional Status: S-SBA  Walk in Room Functional Status: S-SBA  Walk in Corridor Functional Status: S-SBA  Locomotion on Unit Functional Status: S-SBA    MDS GG  Sit to lying Performance: Setup or clean-up assistance  Lying to sitting on side of bed Performance: Setup or clean-up assistance  Sit to Stand Performance: Setup or clean-up assistance  Chair/bed-to-chair transfer Performance: Setup or clean-up assistance  Chair/bed-to-chair transfer Goal: Setup or clean-up assistance  Does the resident walk?: Yes --> Continue to Walk 50 feet with two turns assessment  Walk 50 feet with two turns Performance: Supervision or touching assistance.  Walk 50 feet with two turns Goal: Independent.  Walk 150 feet Performance: Supervision or touching assistance.  Does the resident use a wheelchair/scooter?: Yes --> Continue to Wheel 50 feet with two turns assessment  Wheel 50 feet with two turns Performance: Supervision or touching assistance.     Bed Mobility:  Supine > sit with (S)    Transfers:  Sit <> stand from EOB, w/c with (S)  SPT from bed > w/c with SBA    Gait:  Pt gait trained x 75 feet and 150 feet with RW and SBA for safety.   Assistive Device: rolling " walker  Gait Deviation(s): decreased brii;decreased toe-to-floor clearance;decreased weight-shifting ability;decreased velocity of limb motion;decreased step length;decreased stride length;decreased swing-to-stance ratio     Advanced Gait:  Attempted but pt refusal    Therex:  BLEs seated therex x 30 reps includings: APs, GS, ADD/ABD, LAQs, hip flexion, knee flexion    LBE x 15 minutes    Balloon tap x 2 minutes with SBA      Patient left up in chair with call button in reach.    Assessment:  Blue Cassidy is a 71 y.o. female with a medical diagnosis of Debility.  Pt tolerated treatment session fairly. Pt remains at a (S)/SBA functional level and will continue to benefit from skilled PT services to progress with mobility. Goals remain appropriate.        Rehab identified problem list/impairments: weakness, impaired endurance, impaired self care skills, impaired functional mobilty, gait instability, impaired balance    Rehab potential is fair.    Activity tolerance: Fair    Discharge recommendations: home health PT     Barriers to discharge: None    Equipment recommendations: bath bench     GOALS:    Physical Therapy Goals        Problem: Physical Therapy Goal    Goal Priority Disciplines Outcome Goal Variances Interventions   Physical Therapy Goal     PT/OT, PT Ongoing (interventions implemented as appropriate)     Description:  Goals to be met by: 6 days    Patient will increase functional independence with mobility by performin. Supine to sit with supervision. Not met  2. Sit to supine with supervision. Not met  3. Sit to stand transfer with Supervision. Met (2017)  4. Bed to chair transfer with Supervision using Rolling Walker and/or rollator. Not met  5. Gait  x 150 feet with Supervision using Rolling Walker and/or rollator. Not met   6. Ascend/descend 4 stairs with bilateral Handrails stand-by assistance. Not met  7. Ascend/Descend 4 inch curb step with Stand-by Assistance using Rolling  Walker and/or rollator. Not met  8. Stand for 3 minutes with Stand-by Assistance using Rolling Walker and/or rollator. Not met  9. Lower extremity exercise program x 20 reps per handout, with independence. Not met                       PLAN:    Patient to be seen  (5-6x/wk)  to address the above listed problems via gait training, therapeutic activities, therapeutic exercises, neuromuscular re-education  Plan of Care expires: 10/19/17  Plan of Care reviewed with: patient    Jeffrey EDWARDS Magdalena, PTA  09/25/2017

## 2017-09-26 ENCOUNTER — PATIENT OUTREACH (OUTPATIENT)
Dept: ADMINISTRATIVE | Facility: CLINIC | Age: 72
End: 2017-09-26

## 2017-09-26 NOTE — ASSESSMENT & PLAN NOTE
-blood pressure trending on lower side, adjustments made  -decreased hydralazine from 75 to 50mg TID  -Continue losartan 100mg daily   -continue coreg 25mg bid with hold parameters   -Continue to monitor and adjust regimen as needed

## 2017-09-26 NOTE — DISCHARGE SUMMARY
[facility]  Department of Hospital Medicine  Discharge Summary      Patient Name: Blue Cassidy  MRN: [id number]  Admission Date: 9/18/2017  Hospital Length of Stay: 7 days  Discharge Date and Time: 9/25/2017  3:22 PM  Attending Physician: Lindsey Rodriguez MD   Discharging Provider: Kendra Valdes NP  Primary Care Provider: Alphonso Phelan MD    Chief Complaint/Reason for Admission: Debility    History of Present Illness:  Patient is a 71 y.o. female who has a past medical history of CHF; COPD; CVA; Diabetes; GERD; Gout; Hypertension; Thyroid disease presented after being transferred from [facility] with acute altered mental status with agitation. After extensive workup up etiology likely was likely acute encephalopathy from viral encephalitis. This likely worsened her dementia due to old strokes. Hospital course was prolonged and complicated (all acute care notes have been reviewed in detail). Of note, she developed acute urinary retention and holley placed on 9/10. She arrived to SNF last night with hematuria that improved after being flushed with sterile normal saline and now urine that is clear yellow with slight sediment present. Patient has been working with PT/OT who recommend SNF for further balance/mobility training. Patient lives at home in [place] with her daughter and granddaughter. There is no family currently at bedside. She is tolerating diet without complaints and her last BM was this morning. Patient currently denies any fever/chills, chest pain, dyspnea, weakness, numbness, abdominal pain, nausea/vomiting, dysuria/hematuria, or weight loss.     * No surgery found *      Hospital Course:   9/18/17: Patient admitted to SNF for ongoing PT/OT following a hospitalization for acute encephalopathy from viral encephalitis.  9/20: Patient seen at bedside, mild discomfort to bilateral knee, otherwise doing well   9/21: urine studies positive for UTI treated with  cipro waiting for sensitives.   9/25: urine sensitive to Macrodantin, ABX switched, discussed discharge and follow-up with urology. Answered all questions.      Consults         Status Ordering Provider     IP consult to dietary  Once     Provider:  (Not yet assigned)    Completed FELISA CARDONA          Significant Diagnostic Studies:     Recent Labs  Lab 09/19/17  0605 09/21/17  0437 09/25/17  0428   WBC  --  5.96 4.09   HGB 11.8* 11.6* 11.6*   HCT 39.3 37.9 38.3   PLT  --  224 191         Recent Labs  Lab 09/19/17  0605 09/21/17  0437 09/25/17  0428    140 140   K 3.8 3.5 3.6    99 98   CO2 34* 33* 33*   BUN 13 16 16   CREATININE 0.9 0.9 0.9   CALCIUM 8.9 9.0 9.2     Lab Results   Component Value Date    LABPROT 12.0 09/06/2017    ALBUMIN 3.4 (L) 09/06/2017     Lab Results   Component Value Date    CALCIUM 9.2 09/25/2017    PHOS 3.9 09/25/2017     Microbiology Results (last 7 days)     Procedure Component Value Units Date/Time    Urine culture [[id number]]  (Susceptibility) Collected:  09/18/17 1709    Order Status:  Completed Specimen:  Urine from Urine, Catheterized Updated:  09/22/17 1128     Urine Culture, Routine --     ENTEROCOCCUS FAECALIS  >100,000 cfu/ml            Final Active Diagnoses:    Diagnosis Date Noted POA    PRINCIPAL PROBLEM:  Debility [R53.81] 09/19/2017 Yes    Acquired hypothyroidism [E03.9] 09/19/2017 Yes    Chronic obstructive pulmonary disease [J44.9] 09/19/2017 Yes    Acute urinary retention [R33.8] 09/10/2017 Yes    CHF (congestive heart failure) [I50.9] 09/08/2017 Yes    Hypokalemia [E87.6] 09/08/2017 Yes    Acute encephalopathy [G93.40] 09/07/2017 Yes    Type 2 diabetes mellitus with complication [E11.8] 09/07/2017 Yes    HTN (hypertension), malignant [I10] 09/07/2017 Yes    Cerebrovascular accident (CVA) [I63.9] 09/04/2017 Yes      Problems Resolved During this Admission:    Diagnosis Date Noted Date Resolved POA      * Debility    -Cont with PT/OT for gait  training and strengthening and restoration of ADL's   -Continue DVT prophylaxis with Lovenox   delirium precautions  -fall precautions   -bowel regimen with senokt-s bid and bisacodyl suppository prn, hold for frequent stooling        Chronic obstructive pulmonary disease    -No active wheezes  -Duonebs and oxygen as needed        Cerebrovascular accident (CVA)    -Continue  mg to treat per neurology  -PT/OT as above.         Acquired hypothyroidism    -chronic  -Continue synthroid to treat.         Acute urinary retention    -S/p holley placement  -Continue holley care  -Follow up with urology after discharge from SNF  -spoke with Daughter Manule, void trial failed in the hospital, placed patient on flomax and discussed follow-up with urologist which she is requesting at a [place] location.        Hypokalemia    -Continue potassium to treat  -Monitor labs with biweekly lab draws.         CHF (congestive heart failure)    -Maintain euvolemia by monitoring I/O's and daily weights.  -Fluid restriction (2 liters/24 hours)  -Low Na diet  -Continue ARB, BB.   -continue lasix, monitor renal function, was previous held for increase in Cr.        HTN (hypertension), malignant    -blood pressure trending on lower side, adjustments made  -decreased hydralazine from 75 to 50mg TID  -Continue losartan 100mg daily   -continue coreg 25mg bid with hold parameters   -Continue to monitor and adjust regimen as needed        Type 2 diabetes mellitus with complication    -stable  -A1c in AM  -Continue ADA diet  -Continue BG checks  -SSI, resume home glyburide at discharge   -hypo/hyperglycemic protocol        Acute encephalopathy    -Etiology unclear but likely CNS infection.   -Continues with evidence of mild encephalopathy which fluctuates but has improved per previous reports.  -Keppra has been discontinued by neurology   -Continue Seroquel as needed for acute agitation   -Delirium precautions   -Follow up with neurology  as outpatient after discharge from SNF.         PT note, CROW Nichols, PTA 9/25/17  Functional Status:  MDS G  Bed Mobility Functional Status: S-SBA  Transfer Functional Status: S-SBA  Walk in Room Functional Status: S-SBA  Walk in Corridor Functional Status: S-SBA  Locomotion on Unit Functional Status: S-SBA  MDS GG  Sit to lying Performance: Setup or clean-up assistance  Lying to sitting on side of bed Performance: Setup or clean-up assistance  Sit to Stand Performance: Setup or clean-up assistance  Chair/bed-to-chair transfer Performance: Setup or clean-up assistance  Chair/bed-to-chair transfer Goal: Setup or clean-up assistance  Does the resident walk?: Yes --> Continue to Walk 50 feet with two turns assessment  Walk 50 feet with two turns Performance: Supervision or touching assistance.  Walk 50 feet with two turns Goal: Independent.  Walk 150 feet Performance: Supervision or touching assistance.  Does the resident use a wheelchair/scooter?: Yes --> Continue to Wheel 50 feet with two turns assessment  Wheel 50 feet with two turns Performance: Supervision or touching assistance.   Bed Mobility:  Supine > sit with (S)  Transfers:  Sit <> stand from EOB, w/c with (S)  SPT from bed > w/c with SBA  Gait:  Pt gait trained x 75 feet and 150 feet with RW and SBA for safety.   Assistive Device: rolling walker  Gait Deviation(s): decreased brii;decreased toe-to-floor clearance;decreased weight-shifting ability;decreased velocity of limb motion;decreased step length;decreased stride length;decreased swing-to-stance ratio  Advanced Gait:  Attempted but pt refusal  Discharge recommendations: home health PT    OT note, CAPRI Strong, OT 9/23/17  Functional Status:  MDS G  Transfer Functional Status: S-SBA  Transfer Level of (A):  (sit to stand = SBA with RW)  Eating = MOd I due to use of dentures  Grooming = Mod I to wash hands and oral care in sitting  Moving from seated to standing position: Not steady, but able to  "stabilize without staff assistance  MDS GG  Eating Performance: Independent.  Oral Hygiene Performance: Independent.  Toileting Hygiene Performance: Dependent.  Sit to Stand Performance: Supervision or touching assistance.     Discharged Condition: stable    Disposition: Home-Health Care [facility]    Follow Up:  Follow-up Information     Alphonso Phelan MD. Go on 10/4/2017.    Specialty:  Family Medicine  Why:  hospital follow up at 10am  Contact information:  [address] [place] [address] [phone]             Dave Barrow MD. Go on 10/27/2017.    Specialty:  Neurology  Why:  Neurology follow-up  Contact information:  [address]  [facility] [address]  [phone]             Alphonso Epps IV, MD. Go on 11/14/2017.    Specialty:  Urology  Why:  Urology follow-up  Contact information:  [address]  [place] [address]  [phone]             [facility].    Specialty:  Hospice and Palliative Medicine  Why:  Agency will call pt to schedule a home health assessment.  Contact information:  [address] [facility] [address]  [phone]                 Future Appointments  Date Time Provider Department Center   10/27/2017 2:20 PM Dave Barrow MD [facility] NEURO [facility]   11/14/2017 3:20 PM Alphonso Epps IV, MD [facility] UROLOGY  [facility]     Patient Instructions:     WALKER FOR HOME USE   Order Specific Question Answer Comments   Type of Walker: Heavy duty    With wheels? Yes    Height: 5' 4.02" (1.626 m)    Weight: 138.3 kg (304 lb 14.3 oz)    Length of need (1-99 months): 99    Does patient have medical equipment at home? grab bar    Does patient have medical equipment at home? raised toilet    Does patient have medical equipment at home? rollator    Please check all that apply: Patient's condition impairs ambulation.    Please check all that apply: Patient is unable to safely ambulate without equipment.    Please check all that apply: Walker will " "be used for gait training.      WHEELCHAIR FOR HOME USE   Order Specific Question Answer Comments   Hours in W/C per day: 4    Type of Wheelchair: Lightweight    Patient unable to propel in Standard wheelchair? Yes    Size(Width): 22"    Leg Support: Swing Away    Arm Height: Full length    Desired seat depth: 20    Back height: standard    Lap Belt: Velcro    Cushion: Basic    Justification for cushion: Prevent pressure ulcers    Height: 5' 4.02" (1.626 m)    Weight: 138.3 kg (304 lb 14.3 oz)    Does patient have medical equipment at home? grab bar    Does patient have medical equipment at home? raised toilet    Does patient have medical equipment at home? rollator    Length of need (1-99 months): 12    Please check all that apply: The patient requires the use of a w/c for activities of daily living within the Home.      3 IN 1 COMMODE FOR HOME USE   Order Specific Question Answer Comments   Type: Heavy duty wide   Height: 5' 4.02" (1.626 m)    Weight: 138.3 kg (304 lb 14.3 oz)    Does patient have medical equipment at home? grab bar    Does patient have medical equipment at home? raised toilet    Does patient have medical equipment at home? rollator    Length of need (1-99 months): 99      HIP KIT FOR HOME USE   Order Specific Question Answer Comments   Height: 5' 4.02" (1.626 m)    Weight: 138.3 kg (304 lb 14.3 oz)    Does patient have medical equipment at home? grab bar    Does patient have medical equipment at home? raised toilet    Does patient have medical equipment at home? rollator    Length of need (1-99 months): 99    Type: [place] Hip Kit      TRANSFER TUB BENCH FOR HOME USE   Order Specific Question Answer Comments   Type of Transfer Tub Bench: Unpadded    Height: 5' 4.02" (1.626 m)    Weight: 138.3 kg (304 lb 14.3 oz)    Does patient have medical equipment at home? grab bar    Does patient have medical equipment at home? raised toilet    Does patient have medical equipment at home? rollator    Length " of need (1-99 months): 99      Diet general   Order Specific Question Answer Comments   Additional restrictions: Diabetic 2000    Additional restrictions: Cardiac (Low Na/Chol)      Activity as tolerated     Call MD for:  temperature >100.4     Call MD for:  persistent nausea and vomiting or diarrhea     Call MD for:  severe uncontrolled pain     Call MD for:  difficulty breathing or increased cough     Call MD for:  severe persistent headache     Call MD for:  worsening rash     Call MD for:  persistent dizziness, light-headedness, or visual disturbances     Call MD for:  increased confusion or weakness       Medications:  Reconciled Home Medications:   Discharge Medication List as of 9/25/2017  1:19 PM      START taking these medications    Details   aspirin 325 MG tablet Take 1 tablet (325 mg total) by mouth once daily., Starting Mon 9/25/2017, Until Tue 9/25/2018, OTC      magnesium oxide (MAG-OX) 400 mg tablet Take 1 tablet (400 mg total) by mouth 2 (two) times daily., Starting Mon 9/25/2017, Until Wed 9/27/2017, OTC      nitrofurantoin, macrocrystal-monohydrate, (MACROBID) 100 MG capsule Take 1 capsule (100 mg total) by mouth every 12 (twelve) hours., Starting Mon 9/25/2017, Until Thu 10/5/2017, Normal         CONTINUE these medications which have CHANGED    Details   furosemide (LASIX) 40 MG tablet Take 1 tablet (40 mg total) by mouth 2 (two) times daily., Starting Mon 9/25/2017, Normal      hydrALAZINE (APRESOLINE) 25 MG tablet Take 2 tablets (50 mg total) by mouth every 8 (eight) hours., Starting Mon 9/25/2017, Until Tue 9/25/2018, Normal      levothyroxine (SYNTHROID) 50 MCG tablet Take 1 tablet (50 mcg total) by mouth once daily., Starting Mon 9/25/2017, Normal         CONTINUE these medications which have NOT CHANGED    Details   albuterol (VENTOLIN HFA) 90 mcg/actuation inhaler Inhale 2 puffs into the lungs every 6 (six) hours as needed for Wheezing. Rescue, Historical Med      allopurinol (ZYLOPRIM)  100 MG tablet Take 100 mg by mouth 3 (three) times daily., Historical Med      baclofen (LIORESAL) 10 MG tablet Take 10 mg by mouth 3 (three) times daily., Historical Med      carvedilol (COREG) 25 MG tablet Take 25 mg by mouth 2 (two) times daily with meals., Historical Med      glipiZIDE (GLUCOTROL) 5 MG TR24 Take 5 mg by mouth daily with breakfast., Historical Med      losartan (COZAAR) 100 MG tablet Take 100 mg by mouth once daily., Historical Med      omeprazole (PRILOSEC) 20 MG capsule Take 20 mg by mouth once daily., Historical Med      potassium chloride SA (K-DUR,KLOR-CON) 10 MEQ tablet Take 10 mEq by mouth once daily., Historical Med         STOP taking these medications       CARVEDILOL PHOSPHATE 20 MG ORAL CM24 (COREG CR) 20 mg 24 hr capsule Comments:   Reason for Stopping:             Time spent on the discharge of patient: 30 minutes    Kendra Valdes NP  Department of Hospital Medicine  [facility]

## 2017-09-26 NOTE — PATIENT INSTRUCTIONS
Fall Prevention   Falls often occur due to slipping, tripping or losing your balance. Here are ways to reduce your risk of falling again.   Was there anything that caused your fall that can be fixed, removed or replaced?   Make your home safe by keeping walkways clear of objects you may trip over.   Use non-slip pads under rugs.   Do not walk in poorly lit areas.   Do not stand on chairs or wobbly ladders.   Use caution when reaching overhead or looking upward. This position can cause a loss of balance.   Be sure your shoes fit properly, have non-slip bottoms and are in good condition.   Be cautious when going up and down stairs, curbs, and when walking on uneven sidewalks.   If your balance is poor, consider using a cane or walker.   If your fall was related to alcohol use, stop or limit alcohol intake.   If your fall was related to use of sleeping medicines, talk to your doctor about this. You may need to reduce your dosage at bedtime if you awaken during the night to go to the bathroom.   To reduce the need for nighttime bathroom trips:   Avoid drinking fluids for several hours before going to bed   Empty your bladder before going to bed   Men can keep a urinal at the bedside  Stay as active as you can. Balance, flexibility, strength, and endurance all come from exercise. They all play a role in preventing falls. Ask your heathcare provider which types of activity are right for you.  © 0820-5174 [employer]. [address] [place] [address], [place] [address]. All rights reserved. This information is not intended as a substitute for professional medical care. Always follow your healthcare professional's instructions.

## 2017-09-26 NOTE — ASSESSMENT & PLAN NOTE
-S/p holley placement  -Continue holley care  -Follow up with urology after discharge from SNF  -spoke with Daughter Manuel, void trial failed in the hospital, placed patient on flomax and discussed follow-up with urologist which she is requesting at a [place] location.

## 2017-09-26 NOTE — ASSESSMENT & PLAN NOTE
-stable  -A1c in AM  -Continue ADA diet  -Continue BG checks  -SSI, resume home glyburide at discharge   -hypo/hyperglycemic protocol

## 2017-09-27 NOTE — PHYSICIAN QUERY
PT Name: Blue Cassidy  MR #: [id number]     Physician Query Form - Documentation Clarification      CDS/: ILIANA Fink, RN, CCDS               Contact information: [email or web address]    This form is a permanent document in the medical record.     Query Date: September 27, 2017    By submitting this query, we are merely seeking further clarification of documentation. Please utilize your independent clinical judgment when addressing the question(s) below.    The Medical record reflects the following:    Supporting Clinical Findings Location in Medical Record     Acute encephalopathy 9/11 - waiting on HSV,if positive will need 8 weeks of IV acyclovir, will transfer to rehab when accepted, F/u neurology for rest of results and viral studies. Add prn seoquel q HS for agitation.   9/10 - Discussed nature of brain disorder w Malgorzata and Isabel, who are asking questions. Pt has acute encephalitis, which may improve,has evidence of mild dementia and evidence of old stroke per MRI, and active delerium presently, which may also improve. Discussed cardiac effects of anti-psychotic agents and would use them only if behavior can't be controlled. They intend on taking her home. MS likely to improve in a stable environment. 24 hour EEG ordered.   9/9 - LP results pending, + protein suggest possible viral etiology   9/8- atempting to set up LP and MRI under sedation with anesthesia     HSV negative, unceetain etiology of encephalapthy     LP is WNL, HSV is neg, will stop acyclovir     Delirium   Suspect seizure with viral encephalitis   keppra started   HSV and other panels neagtive   9/18 Discharge Summary     Protein, CSF = 270  Lymphs, CSF = 86   9/8 Labs                                                                            Doctor, Please specify diagnosis or diagnoses associated with above clinical findings.    Please clarify viral encephalitis diagnosis.    Provider Use Only    (  x ) Encephalitis due to  unknown virus ruled in and etiology of Acute Encephalopathy    (   ) Viral Encephalitis ruled out    (   ) Other (specify): __________________________________________                                                                                                                     [  ] Clinically undetermined

## 2017-10-16 NOTE — ANESTHESIA PROCEDURE NOTES
Spinal    Diagnosis: encephalopathy  Patient location during procedure: holding area  Start time: 10/16/2017 3:25 PM  Timeout: 10/16/2017 3:25 PM  End time: 10/16/2017 4:00 PM  Staffing  Anesthesiologist: CRISTELA ASENCIO  Resident/CRNA: MANDO ECHAVARRIA  Performed: resident/CRNA   Spinal Block  Patient position: right lateral decubitus  Prep: ChloraPrep  Patient monitoring: heart rate, cardiac monitor and continuous pulse ox  Approach: midline  Location: L3-4  Injection technique: lumbar puncture  CSF Fluid: clear free-flowing CSF  Needle  Needle type: Lisandra   Needle gauge: 25 G  Needle length: 5 in  Additional Documentation: negative aspiration for heme  Needle localization: anatomical landmarks  Additional Notes  After induction of general anesthesia and intubation, patient was placed in lateral decubitus position.  Site of LP was prepped and draped per in proper sterile fashion, lumbar puncture was performed, and CSF was collected for lab testing.

## 2017-10-16 NOTE — ADDENDUM NOTE
Addendum  created 10/16/17 1321 by Jaime Rawls MD    Anesthesia Intra Blocks edited, Child order released for a procedure order, Sign clinical note

## 2017-10-27 ENCOUNTER — OFFICE VISIT (OUTPATIENT)
Dept: NEUROLOGY | Facility: CLINIC | Age: 72
End: 2017-10-27
Payer: MEDICARE

## 2017-10-27 VITALS — BODY MASS INDEX: 50.02 KG/M2 | WEIGHT: 293 LBS | HEIGHT: 64 IN

## 2017-10-27 DIAGNOSIS — A86 VIRAL ENCEPHALITIS: Primary | ICD-10-CM

## 2017-10-27 PROCEDURE — 99999 PR PBB SHADOW E&M-EST. PATIENT-LVL III: CPT | Mod: PBBFAC,,, | Performed by: PSYCHIATRY & NEUROLOGY

## 2017-10-27 PROCEDURE — 99214 OFFICE O/P EST MOD 30 MIN: CPT | Mod: S$PBB,,, | Performed by: PSYCHIATRY & NEUROLOGY

## 2017-10-27 PROCEDURE — 99213 OFFICE O/P EST LOW 20 MIN: CPT | Mod: PBBFAC | Performed by: PSYCHIATRY & NEUROLOGY

## 2017-10-27 NOTE — PROGRESS NOTES
[facility] - NEUROLOGY  [facility] [place]    Date: October 27, 2017   Patient Name: Blue Cassidy   MRN: [id number]   PCP: Alphonso Phelan  Referring Provider: Self, Aaareferral    Assessment:      This is Blue Cassidy, 71 y.o. female with HTN, DM, hypothyroid, remote right frontal subcortical infarcts presents for hospital follow up for viral encephalitis.  She is fully back to neurologic baseline, daughter was advised to monitor for any seizure activity but she does not require AED at this time.     Plan:      -  Follow up as needed       I discussed side effects of the medications. I asked the patient to stop the medication if she notices serious adverse effects as we discussed and to seek immediate medical attention at an ER.     Dave Barrow MD  [facility]   Department of Neurology    Subjective:      HPI:   Ms. Blue Cassidy is a 71 y.o. female who presents for hospital follow up for viral encephalitis    She presents with daughter who reports she has fully returned to baseline with no episodes concerning for seizure, mild left sided deficit from old stroke unchanged.  She continues with holley catheter with urology follow up pending.      PAST MEDICAL HISTORY:  Past Medical History:   Diagnosis Date    CHF (congestive heart failure)     COPD (chronic obstructive pulmonary disease)     CVA (cerebral vascular accident)     Diabetes     GERD (gastroesophageal reflux disease)     Gout     Hypertension     Thyroid disease     UTI (urinary tract infection)        PAST SURGICAL HISTORY:  Past Surgical History:   Procedure Laterality Date    HYSTERECTOMY      JOINT REPLACEMENT      right knee    CT REMOVAL GALLBLADDER      Cholecystectomy    THYROID SURGERY         CURRENT MEDS:  Current Outpatient Prescriptions   Medication Sig Dispense Refill    albuterol (VENTOLIN HFA) 90 mcg/actuation inhaler Inhale 2 puffs into the lungs every 6 (six) hours as  "needed for Wheezing. Rescue      allopurinol (ZYLOPRIM) 100 MG tablet Take 100 mg by mouth 3 (three) times daily.      aspirin 325 MG tablet Take 1 tablet (325 mg total) by mouth once daily.  0    baclofen (LIORESAL) 10 MG tablet Take 10 mg by mouth 3 (three) times daily.      carvedilol (COREG) 25 MG tablet Take 25 mg by mouth 2 (two) times daily with meals.      furosemide (LASIX) 40 MG tablet Take 1 tablet (40 mg total) by mouth 2 (two) times daily. 60 tablet 1    glipiZIDE (GLUCOTROL) 5 MG TR24 Take 5 mg by mouth daily with breakfast.      hydrALAZINE (APRESOLINE) 25 MG tablet Take 2 tablets (50 mg total) by mouth every 8 (eight) hours. 90 tablet 1    levothyroxine (SYNTHROID) 50 MCG tablet Take 1 tablet (50 mcg total) by mouth once daily. 30 tablet 1    losartan (COZAAR) 100 MG tablet Take 100 mg by mouth once daily.      omeprazole (PRILOSEC) 20 MG capsule Take 20 mg by mouth once daily.      potassium chloride SA (K-DUR,KLOR-CON) 10 MEQ tablet Take 10 mEq by mouth once daily.       No current facility-administered medications for this visit.        ALLERGIES:  Review of patient's allergies indicates:  No Known Allergies    FAMILY HISTORY:  No family history on file.    SOCIAL HISTORY:  Social History   Substance Use Topics    Smoking status: Former Smoker     Years: 15.00     Types: Cigarettes    Smokeless tobacco: Former User      Comment: greater than 18 years    Alcohol use No       Review of Systems:  12 review of systems is negative except for the symptoms mentioned in HPI.        Objective:     Vitals:    10/27/17 1424   Weight: (!) 137.9 kg (304 lb)   Height: 5' 4" (1.626 m)       General: NAD, well nourished   Eyes: no tearing, discharge, no erythema   ENT: moist mucous membranes of the oral cavity, nares patent    Neck: Supple, full range of motion  Cardiovascular: Warm and well perfused, pulses equal and symmetrical  Lungs: Normal work of breathing, normal chest wall excursions  Skin: " No rash, lesions, or breakdown on exposed skin  Psychiatry: Mood and affect are appropriate   Abdomen: soft, non tender, non distended  Extremeties: No cyanosis, clubbing or edema.    Neurological   MENTAL STATUS: Alert and oriented to October 27, 2017.  Recall 2/3 +1MC.  Able to do days of week in reverse but months only to August.  Some difficulty with abstraction   CRANIAL NERVES: Visual fields intact. PERRL. EOMI. Facial sensation intact. Face symmetrical. Hearing grossly intact. Full shoulder shrug bilaterally. Tongue protrudes midline   SENSORY: Sensation is intact to light touch throughout.     MOTOR: left arm and leg 4/5 proximal and 5/5 distal; right 5/5 throughout.    REFLEXES: Left biceps and patella 3+, right biceps 2+ and patella mute.   CEREBELLAR/COORDINATION/GAIT: Gait not tested. Finger to nose intact. Normal rapid alternating movements.

## 2017-11-14 ENCOUNTER — OFFICE VISIT (OUTPATIENT)
Dept: UROLOGY | Facility: CLINIC | Age: 72
End: 2017-11-14
Payer: MEDICARE

## 2017-11-14 DIAGNOSIS — R33.9 URINARY RETENTION: ICD-10-CM

## 2017-11-14 DIAGNOSIS — R31.9 HEMATURIA, UNSPECIFIED TYPE: Primary | ICD-10-CM

## 2017-11-14 PROCEDURE — 99211 OFF/OP EST MAY X REQ PHY/QHP: CPT | Mod: PBBFAC | Performed by: UROLOGY

## 2017-11-14 PROCEDURE — 99999 PR PBB SHADOW E&M-EST. PATIENT-LVL I: CPT | Mod: PBBFAC,,, | Performed by: UROLOGY

## 2017-11-14 PROCEDURE — 99204 OFFICE O/P NEW MOD 45 MIN: CPT | Mod: S$PBB,,, | Performed by: UROLOGY

## 2017-11-14 NOTE — PROGRESS NOTES
Chief Complaint: Urinary Retention    HPI:   11/14/17: 72 yo woman was hospitalized recently and a holley was placed for retention.  Hospitalized for viral/acute encephalopathy.  Had hematuria for part of this period, but not prior to holley.  Feeling a lot better.   Voided fine prior to this occasion.  No abd/pelvic pain and no exac/rel factors.  No prior hematuria.  No urolithiasis.  No prior urinary bother. Had a UTI during her catheterization.     Allergies:  Patient has no known allergies.    Medications: has a current medication list which includes the following prescription(s): albuterol, allopurinol, aspirin, baclofen, carvedilol, furosemide, glipizide, hydralazine, levothyroxine, losartan, omeprazole, and potassium chloride sa.    Review of Systems:  General: No fever, chills, fatigability, or weight loss.  Skin: No rashes, itching, or changes in color or texture of skin.  Chest: Denies DUNN, cyanosis, wheezing, cough, and sputum production.  Abdomen: Appetite fine. No weight loss. Denies diarrhea, abdominal pain, hematemesis, or blood in stool.  Musculoskeletal: No joint stiffness or swelling. Denies back pain.  : As above.  All other review of systems negative.    PMH:   has a past medical history of CHF (congestive heart failure); COPD (chronic obstructive pulmonary disease); CVA (cerebral vascular accident); Diabetes; GERD (gastroesophageal reflux disease); Gout; Hypertension; Thyroid disease; and UTI (urinary tract infection).    PSH:   has a past surgical history that includes Hysterectomy; pr removal gallbladder; Joint replacement; and Thyroid surgery.    FamHx: family history is not on file.    SocHx:  reports that she has quit smoking. Her smoking use included Cigarettes. She quit after 15.00 years of use. She has quit using smokeless tobacco. She reports that she does not drink alcohol or use drugs.     Physical Exam:  Vitals: There were no vitals filed for this visit.  General: A&Ox3. No apparent  distress. No deformities.  Neck: No masses. Normal thyroid.  Lungs: normal inspiration. No use of accessory muscles.  Heart: normal pulse. No arrhythmias.  Abdomen: Soft. NT. ND. No masses. No hernias. No hepatosplenomegaly.  Lymphatic: Neck and groin nodes negative.  Skin: The skin is warm and dry. No jaundice.  Ext: No c/c/e.  : deferred    Labs/Studies:     Impression/Plan:   1. Will get a CT Urogram and RTC for cystoscopy based on hematuria reported in discharge summaries.  Will undergo voiding trial after cysto.

## 2017-11-20 ENCOUNTER — HOSPITAL ENCOUNTER (OUTPATIENT)
Dept: RADIOLOGY | Facility: HOSPITAL | Age: 72
Discharge: HOME | End: 2017-11-20
Attending: UROLOGY
Payer: MEDICARE

## 2017-11-20 DIAGNOSIS — R33.9 URINARY RETENTION: ICD-10-CM

## 2017-11-20 DIAGNOSIS — R31.9 HEMATURIA, UNSPECIFIED TYPE: ICD-10-CM

## 2017-11-20 PROCEDURE — 25500020 PHARM REV CODE 255: Performed by: UROLOGY

## 2017-11-20 PROCEDURE — 74178 CT ABD&PLV WO CNTR FLWD CNTR: CPT | Mod: TC

## 2017-11-20 RX ADMIN — IOHEXOL 85 ML: 350 INJECTION, SOLUTION INTRAVENOUS at 03:11

## 2017-11-24 NOTE — PT/OT/SLP PROGRESS
Occupational Therapy  Treatment    Blue Cassidy   MRN: [id number]   Admitting Diagnosis: Debility    OT Date of Treatment: 09/22/17  Total Time (min): 54 min    Billable Minutes:  Self Care/Home Management 30, Therapeutic Activity 10 and Therapeutic Exercise 14    General Precautions: Standard, aspiration, fall, NPO  Orthopedic Precautions: N/A  Braces: N/A         Subjective:  Communicated with nurse prior to session.      Pain/Comfort  Pain Rating 1: 0/10    Objective:  Patient found with: holley catheter    Functional Status:  MDS G  Transfer Functional Status: SBA (sit to stand from bedside chair and W/C to RW. SPT performed with SBA from bedside chair to W/C with RW .)    Dressing Functional Status: 2:CGA-Min (A) (Pt donning gown with SBA and socks and pants donned with CGA when standing to manage pants over hips.   RW to stand.)          OT Exercises: Performed 15 minutes on UBE with Mod resistance.    Additional Treatment:  Pt performed functional standing balance activity with Pt performing reaching task and tolerating up to 6 min 23 sec with RW and SBA for safety.    Patient left up in chair with call button in reach and nurse notified    ASSESSMENT:  Blue Cassidy is a 71 y.o. female with a medical diagnosis of Debility .  Pt was agreeable to OT and tolerated Tx without incident but continues to require (A) to perform functional activities to completion as well as to safely perform functional mobility and functional transfers.  Pt is making slow progress but continues to require OT Intervention to further her functional (I)ce and safety. Goals remain appropriate and continued OT is recommended.    Rehab identified problem list/impairments: weakness, impaired endurance, impaired self care skills, impaired functional mobilty, gait instability, impaired balance, decreased coordination, decreased lower extremity function, decreased safety awareness, impaired coordination, edema    Rehab potential is  good    Activity tolerance: Good    Discharge recommendations: home health OT     Barriers to discharge: None     Equipment recommendations: bath bench     GOALS:    Occupational Therapy Goals        Problem: Occupational Therapy Goal    Goal Priority Disciplines Outcome Interventions   Occupational Therapy Goal     OT, PT/OT Ongoing (interventions implemented as appropriate)    Description:  Goals to be met by: 7 days     Patient will increase functional independence with ADLs by performing:    Feeding with Modified [place].  UE Dressing with Modified [place].  LE Dressing with Set-up Assistance and Supervision using AD.  Grooming while standing with Modified [place].  Toileting from bedside commode with Supervision for hygiene and clothing management.   Bathing from  shower chair/bench with Supervision.  Toilet transfer to bedside commode with Stand-by Assistance.  Upper extremity exercise program x12 reps per handout, with supervision.                      Plan:  Patient to be seen  (5-6x/week) to address the above listed problems via self-care/home management, therapeutic activities, therapeutic exercises  Plan of Care expires: 10/19/17  Plan of Care reviewed with: patient    SELAM Strickland/CAROLYN  09/22/2017   Spontaneous, unlabored and symmetrical

## 2017-12-07 ENCOUNTER — OFFICE VISIT (OUTPATIENT)
Dept: UROLOGY | Facility: CLINIC | Age: 72
End: 2017-12-07
Payer: MEDICARE

## 2017-12-07 VITALS
BODY MASS INDEX: 50.02 KG/M2 | HEART RATE: 57 BPM | DIASTOLIC BLOOD PRESSURE: 80 MMHG | HEIGHT: 64 IN | WEIGHT: 293 LBS | SYSTOLIC BLOOD PRESSURE: 132 MMHG

## 2017-12-07 DIAGNOSIS — R31.9 HEMATURIA, UNSPECIFIED TYPE: Primary | ICD-10-CM

## 2017-12-07 PROCEDURE — 52000 CYSTOURETHROSCOPY: CPT | Mod: PBBFAC | Performed by: UROLOGY

## 2017-12-07 PROCEDURE — 99999 PR PBB SHADOW E&M-EST. PATIENT-LVL II: CPT | Mod: PBBFAC,,, | Performed by: UROLOGY

## 2017-12-07 PROCEDURE — 99212 OFFICE O/P EST SF 10 MIN: CPT | Mod: PBBFAC,25 | Performed by: UROLOGY

## 2017-12-07 PROCEDURE — 52000 CYSTOURETHROSCOPY: CPT | Mod: S$PBB,,, | Performed by: UROLOGY

## 2017-12-07 PROCEDURE — 99499 UNLISTED E&M SERVICE: CPT | Mod: S$PBB,,, | Performed by: UROLOGY

## 2017-12-07 PROCEDURE — 96372 THER/PROPH/DIAG INJ SC/IM: CPT | Mod: PBBFAC

## 2017-12-07 RX ORDER — CEFTRIAXONE 1 G/1
1 INJECTION, POWDER, FOR SOLUTION INTRAMUSCULAR; INTRAVENOUS
Status: COMPLETED | OUTPATIENT
Start: 2017-12-07 | End: 2017-12-07

## 2017-12-07 RX ADMIN — CEFTRIAXONE 1 G: 1 INJECTION, POWDER, FOR SOLUTION INTRAMUSCULAR; INTRAVENOUS at 04:12

## 2017-12-07 NOTE — PROGRESS NOTES
Chief Complaint: Urinary Retention    HPI:   12/7/17: Ct Urogram normal.  Cysto normal.  Voided fine today.  11/14/17: 70 yo woman was hospitalized recently and a holley was placed for retention.  Hospitalized for viral/acute encephalopathy.  Had hematuria for part of this period, but not prior to holley.  Feeling a lot better.   Voided fine prior to this occasion.  No abd/pelvic pain and no exac/rel factors.  No prior hematuria.  No urolithiasis.  No prior urinary bother. Had a UTI during her catheterization.     Allergies:  Patient has no known allergies.    Medications: has a current medication list which includes the following prescription(s): albuterol, allopurinol, aspirin, baclofen, carvedilol, furosemide, glipizide, hydralazine, levothyroxine, losartan, omeprazole, and potassium chloride sa.    Review of Systems:  General: No fever, chills, fatigability, or weight loss.  Skin: No rashes, itching, or changes in color or texture of skin.  Chest: Denies DUNN, cyanosis, wheezing, cough, and sputum production.  Abdomen: Appetite fine. No weight loss. Denies diarrhea, abdominal pain, hematemesis, or blood in stool.  Musculoskeletal: No joint stiffness or swelling. Denies back pain.  : As above.  All other review of systems negative.    PMH:   has a past medical history of CHF (congestive heart failure); COPD (chronic obstructive pulmonary disease); CVA (cerebral vascular accident); Diabetes; GERD (gastroesophageal reflux disease); Gout; Hypertension; Thyroid disease; and UTI (urinary tract infection).    PSH:   has a past surgical history that includes Hysterectomy; pr removal gallbladder; Joint replacement; and Thyroid surgery.    FamHx: family history is not on file.    SocHx:  reports that she has quit smoking. Her smoking use included Cigarettes. She quit after 15.00 years of use. She has quit using smokeless tobacco. She reports that she does not drink alcohol or use drugs.     Physical Exam:  Vitals:    Vitals:    12/07/17 1539   BP: 132/80   Pulse: (!) 57     General: A&Ox3. No apparent distress. No deformities.  Neck: No masses. Normal thyroid.  Lungs: normal inspiration. No use of accessory muscles.  Heart: normal pulse. No arrhythmias.  Abdomen: Soft. NT. ND.   Skin: The skin is warm and dry. No jaundice.  Ext: No c/c/e.  : external genitalia normal    Labs/Studies:     Procedure: Diagnostic Cystoscopy    Procedure in Detail: After proper consents were obtained, the patient was prepped and draped in normal sterile fashion for diagnostic cystoscopy. 5 ml of lidocaine jelly was instilled in the urethra. The flexible cystoscope was then introduced into the urethra, and advanced into the bladder under direct vision. The urethral mucosa appeared normal, and no strictures were note. The bladder neck was normal. Inspection of the interior of the bladder was then carried out. The trigone was unremarkable, with no mucosal lesions. The ureteral orifices were normal in position and configuration. Systematic inspection of the mucosa of the bladder it was then carried out, rotating the cystoscope so that all areas of the left and right lateral walls, the dome of the bladder, and the posterior wall were all visualized. The cystoscope was then advanced further into the bladder, and maximum deflection of the scope was performed so that the bladder neck could be inspected. No mucosal lesions were noted there. The cystoscope was then removed, and the procedure terminated.     Findings: normal cysto    ABx: Rocephin 1 gm IM    Impression/Plan:   1. CT Urogram, cysto normal; pt voided completely.  No findings to explain hematuria.  RTC 1 mo to recheck.  If she enters retention she will see urgent care close to home for a catheter and RTC sooner.

## 2017-12-08 LAB
WEST NILE VIRUS CSF INTERP: NORMAL
WNV IGG CSF QL: <1.3
WNV IGM CSF QL IA: <0.9

## 2018-01-08 ENCOUNTER — OFFICE VISIT (OUTPATIENT)
Dept: UROLOGY | Facility: CLINIC | Age: 73
End: 2018-01-08
Payer: MEDICARE

## 2018-01-08 VITALS
SYSTOLIC BLOOD PRESSURE: 136 MMHG | DIASTOLIC BLOOD PRESSURE: 82 MMHG | WEIGHT: 293 LBS | HEART RATE: 62 BPM | HEIGHT: 64 IN | BODY MASS INDEX: 50.02 KG/M2

## 2018-01-08 DIAGNOSIS — R33.9 URINARY RETENTION: Primary | ICD-10-CM

## 2018-01-08 LAB
BILIRUB SERPL-MCNC: NORMAL MG/DL
BLOOD URINE, POC: NORMAL
COLOR, POC UA: YELLOW
GLUCOSE UR QL STRIP: NORMAL
KETONES UR QL STRIP: NORMAL
LEUKOCYTE ESTERASE URINE, POC: NORMAL
NITRITE, POC UA: NORMAL
PH, POC UA: 5
POC RESIDUAL URINE VOLUME: 101 ML (ref 0–100)
PROTEIN, POC: NORMAL
SPECIFIC GRAVITY, POC UA: 1.02
UROBILINOGEN, POC UA: NORMAL

## 2018-01-08 PROCEDURE — 99214 OFFICE O/P EST MOD 30 MIN: CPT | Mod: S$PBB,,, | Performed by: UROLOGY

## 2018-01-08 PROCEDURE — 99212 OFFICE O/P EST SF 10 MIN: CPT | Mod: PBBFAC | Performed by: UROLOGY

## 2018-01-08 PROCEDURE — 81002 URINALYSIS NONAUTO W/O SCOPE: CPT | Mod: PBBFAC | Performed by: UROLOGY

## 2018-01-08 PROCEDURE — 51798 US URINE CAPACITY MEASURE: CPT | Mod: PBBFAC | Performed by: UROLOGY

## 2018-01-08 PROCEDURE — 99999 PR PBB SHADOW E&M-EST. PATIENT-LVL II: CPT | Mod: PBBFAC,,, | Performed by: UROLOGY

## 2018-01-08 RX ORDER — TAMSULOSIN HYDROCHLORIDE 0.4 MG/1
0.4 CAPSULE ORAL DAILY
Qty: 30 CAPSULE | Refills: 11 | Status: SHIPPED | OUTPATIENT
Start: 2018-01-08 | End: 2018-06-14 | Stop reason: SDUPTHER

## 2018-01-08 NOTE — PROGRESS NOTES
Chief Complaint: Urinary Retention    HPI:   1/8/18: Says she is voiding fine no problems in interval.  Asymptomatic for UTI but +nit urine.  12/7/17: Ct Urogram normal.  Cysto normal.  Voided fine today. No findings to explain hematuria.  11/14/17: 70 yo woman was hospitalized recently and a holley was placed for retention.  Hospitalized for viral/acute encephalopathy.  Had hematuria for part of this period, but not prior to holley.  Feeling a lot better.   Voided fine prior to this occasion.  No abd/pelvic pain and no exac/rel factors.  No prior hematuria.  No urolithiasis.  No prior urinary bother. Had a UTI during her catheterization.     Allergies:  Patient has no known allergies.    Medications: has a current medication list which includes the following prescription(s): albuterol, allopurinol, aspirin, baclofen, carvedilol, furosemide, glipizide, hydralazine, levothyroxine, losartan, omeprazole, and potassium chloride sa.    Review of Systems:  General: No fever, chills, fatigability, or weight loss.  Skin: No rashes, itching, or changes in color or texture of skin.  Chest: Denies DUNN, cyanosis, wheezing, cough, and sputum production.  Abdomen: Appetite fine. No weight loss. Denies diarrhea, abdominal pain, hematemesis, or blood in stool.  Musculoskeletal: No joint stiffness or swelling. Denies back pain.  : As above.  All other review of systems negative.    PMH:   has a past medical history of CHF (congestive heart failure); COPD (chronic obstructive pulmonary disease); CVA (cerebral vascular accident); Diabetes; GERD (gastroesophageal reflux disease); Gout; Hypertension; Thyroid disease; and UTI (urinary tract infection).    PSH:   has a past surgical history that includes Hysterectomy; pr removal gallbladder; Joint replacement; and Thyroid surgery.    FamHx: family history is not on file.    SocHx:  reports that she has quit smoking. Her smoking use included Cigarettes. She quit after 15.00 years of use.  She has quit using smokeless tobacco. She reports that she does not drink alcohol or use drugs.     Physical Exam:  Vitals:   Vitals:    01/08/18 1416   BP: 136/82   Pulse: 62     General: A&Ox3. No apparent distress. No deformities.  Neck: No masses. Normal thyroid.  Lungs: normal inspiration. No use of accessory muscles.  Heart: normal pulse. No arrhythmias.  Abdomen: Soft. NT. ND.   Skin: The skin is warm and dry. No jaundice.  Ext: No c/c/e.  :   12/17: external genitalia normal    Labs/Studies:   Bladder Scan performed in office: , then 101 ml after being asked to void agin.    Impression/Plan:   1. Will not treat asymptomatic UTI  2. Adding flomax, PFMT, and RTC 3 mo to reassess

## 2018-06-14 ENCOUNTER — OFFICE VISIT (OUTPATIENT)
Dept: UROLOGY | Facility: CLINIC | Age: 73
End: 2018-06-14
Payer: MEDICARE

## 2018-06-14 VITALS — HEIGHT: 64 IN | BODY MASS INDEX: 50.02 KG/M2 | WEIGHT: 293 LBS

## 2018-06-14 DIAGNOSIS — R33.9 URINARY RETENTION: Primary | ICD-10-CM

## 2018-06-14 LAB
BILIRUB SERPL-MCNC: NORMAL MG/DL
BLOOD URINE, POC: NORMAL
COLOR, POC UA: YELLOW
GLUCOSE UR QL STRIP: NORMAL
KETONES UR QL STRIP: NORMAL
LEUKOCYTE ESTERASE URINE, POC: NORMAL
NITRITE, POC UA: NORMAL
PH, POC UA: 5
POC RESIDUAL URINE VOLUME: 188 ML (ref 0–100)
PROTEIN, POC: NORMAL
SPECIFIC GRAVITY, POC UA: 1.02
UROBILINOGEN, POC UA: NORMAL

## 2018-06-14 PROCEDURE — 99999 PR PBB SHADOW E&M-EST. PATIENT-LVL III: CPT | Mod: PBBFAC,,, | Performed by: UROLOGY

## 2018-06-14 PROCEDURE — 81002 URINALYSIS NONAUTO W/O SCOPE: CPT | Mod: PBBFAC | Performed by: UROLOGY

## 2018-06-14 PROCEDURE — 99213 OFFICE O/P EST LOW 20 MIN: CPT | Mod: S$PBB,,, | Performed by: UROLOGY

## 2018-06-14 PROCEDURE — 99213 OFFICE O/P EST LOW 20 MIN: CPT | Mod: PBBFAC | Performed by: UROLOGY

## 2018-06-14 PROCEDURE — 51798 US URINE CAPACITY MEASURE: CPT | Mod: PBBFAC | Performed by: UROLOGY

## 2018-06-14 RX ORDER — TAMSULOSIN HYDROCHLORIDE 0.4 MG/1
0.4 CAPSULE ORAL DAILY
Qty: 30 CAPSULE | Refills: 11 | Status: SHIPPED | OUTPATIENT
Start: 2018-06-14 | End: 2019-09-12 | Stop reason: SDUPTHER

## 2018-06-14 NOTE — PROGRESS NOTES
Chief Complaint: Urinary Retention    HPI:   6/14/18: Didn't go to [facility] for insurance reasons.  Says she is voiding fine.  Asymptomatic still.  1/8/18: Says she is voiding fine no problems in interval.  Asymptomatic for UTI but +nit urine.  12/7/17: Ct Urogram normal.  Cysto normal.  Voided fine today. No findings to explain hematuria.  11/14/17: 70 yo woman was hospitalized recently and a holley was placed for retention.  Hospitalized for viral/acute encephalopathy.  Had hematuria for part of this period, but not prior to holley.  Feeling a lot better.   Voided fine prior to this occasion.  No abd/pelvic pain and no exac/rel factors.  No prior hematuria.  No urolithiasis.  No prior urinary bother. Had a UTI during her catheterization.     Allergies:  Patient has no known allergies.    Medications: has a current medication list which includes the following prescription(s): albuterol, allopurinol, aspirin, baclofen, carvedilol, furosemide, glipizide, hydralazine, levothyroxine, losartan, omeprazole, potassium chloride sa, and tamsulosin.    Review of Systems:  General: No fever, chills, fatigability, or weight loss.  Skin: No rashes, itching, or changes in color or texture of skin.  Chest: Denies DUNN, cyanosis, wheezing, cough, and sputum production.  Abdomen: Appetite fine. No weight loss. Denies diarrhea, abdominal pain, hematemesis, or blood in stool.  Musculoskeletal: No joint stiffness or swelling. Denies back pain.  : As above.  All other review of systems negative.    PMH:   has a past medical history of CHF (congestive heart failure); COPD (chronic obstructive pulmonary disease); CVA (cerebral vascular accident); Diabetes; GERD (gastroesophageal reflux disease); Gout; Hypertension; Thyroid disease; and UTI (urinary tract infection).    PSH:   has a past surgical history that includes Hysterectomy; pr removal gallbladder; Joint replacement; and Thyroid surgery.    FamHx: family history is not on  file.    SocHx:  reports that she has quit smoking. Her smoking use included Cigarettes. She quit after 15.00 years of use. She has quit using smokeless tobacco. She reports that she does not drink alcohol or use drugs.     Physical Exam:  Vitals:   There were no vitals filed for this visit.  General: A&Ox3. No apparent distress. No deformities.  Neck: No masses. Normal thyroid.  Lungs: normal inspiration. No use of accessory muscles.  Heart: normal pulse. No arrhythmias.  Abdomen: Soft. NT. ND.   Skin: The skin is warm and dry. No jaundice.  Ext: No c/c/e.  :   12/17: external genitalia normal    Labs/Studies:   Bladder Scan performed in office: , then 101 ml after being asked to void agin.    Impression/Plan:   1. Will not treat asymptomatic UTI  2. Continue flomax.    3. RTC 1 year

## 2019-06-06 ENCOUNTER — HOSPITAL ENCOUNTER (OUTPATIENT)
Dept: RADIOLOGY | Facility: HOSPITAL | Age: 74
Discharge: HOME | End: 2019-06-06
Attending: UROLOGY
Payer: MEDICARE

## 2019-06-06 DIAGNOSIS — R31.9 HEMATURIA, UNSPECIFIED TYPE: ICD-10-CM

## 2019-06-06 PROCEDURE — 76770 US RETROPERITONEAL COMPLETE: ICD-10-PCS | Mod: 26,,, | Performed by: RADIOLOGY

## 2019-06-06 PROCEDURE — 76770 US EXAM ABDO BACK WALL COMP: CPT | Mod: TC

## 2019-06-06 PROCEDURE — 76770 US EXAM ABDO BACK WALL COMP: CPT | Mod: 26,,, | Performed by: RADIOLOGY

## 2019-09-12 ENCOUNTER — OFFICE VISIT (OUTPATIENT)
Dept: UROLOGY | Facility: CLINIC | Age: 74
End: 2019-09-12
Payer: MEDICARE

## 2019-09-12 VITALS
BODY MASS INDEX: 50.02 KG/M2 | SYSTOLIC BLOOD PRESSURE: 138 MMHG | WEIGHT: 293 LBS | DIASTOLIC BLOOD PRESSURE: 68 MMHG | HEART RATE: 69 BPM | HEIGHT: 64 IN

## 2019-09-12 DIAGNOSIS — R33.9 URINARY RETENTION: Primary | ICD-10-CM

## 2019-09-12 PROCEDURE — 99213 OFFICE O/P EST LOW 20 MIN: CPT | Mod: S$GLB,,, | Performed by: UROLOGY

## 2019-09-12 PROCEDURE — 3075F PR MOST RECENT SYSTOLIC BLOOD PRESS GE 130-139MM HG: ICD-10-PCS | Mod: CPTII,S$GLB,, | Performed by: UROLOGY

## 2019-09-12 PROCEDURE — 1101F PT FALLS ASSESS-DOCD LE1/YR: CPT | Mod: CPTII,S$GLB,, | Performed by: UROLOGY

## 2019-09-12 PROCEDURE — 3078F DIAST BP <80 MM HG: CPT | Mod: CPTII,S$GLB,, | Performed by: UROLOGY

## 2019-09-12 PROCEDURE — 99999 PR PBB SHADOW E&M-EST. PATIENT-LVL II: ICD-10-PCS | Mod: PBBFAC,,, | Performed by: UROLOGY

## 2019-09-12 PROCEDURE — 3075F SYST BP GE 130 - 139MM HG: CPT | Mod: CPTII,S$GLB,, | Performed by: UROLOGY

## 2019-09-12 PROCEDURE — 1101F PR PT FALLS ASSESS DOC 0-1 FALLS W/OUT INJ PAST YR: ICD-10-PCS | Mod: CPTII,S$GLB,, | Performed by: UROLOGY

## 2019-09-12 PROCEDURE — 99213 PR OFFICE/OUTPT VISIT, EST, LEVL III, 20-29 MIN: ICD-10-PCS | Mod: S$GLB,,, | Performed by: UROLOGY

## 2019-09-12 PROCEDURE — 3078F PR MOST RECENT DIASTOLIC BLOOD PRESSURE < 80 MM HG: ICD-10-PCS | Mod: CPTII,S$GLB,, | Performed by: UROLOGY

## 2019-09-12 PROCEDURE — 99999 PR PBB SHADOW E&M-EST. PATIENT-LVL II: CPT | Mod: PBBFAC,,, | Performed by: UROLOGY

## 2019-09-12 RX ORDER — TAMSULOSIN HYDROCHLORIDE 0.4 MG/1
0.4 CAPSULE ORAL DAILY
Qty: 30 CAPSULE | Refills: 11 | Status: SHIPPED | OUTPATIENT
Start: 2019-09-12 | End: 2020-09-11

## 2019-09-12 NOTE — PROGRESS NOTES
Chief Complaint: Urinary Retention    HPI:   9/12/19: All going well, asymptomatic for any problems.  Reviewed history in detail.   6/14/18: Didn't go to [facility] for insurance reasons.  Says she is voiding fine.  Asymptomatic still.  1/8/18: Says she is voiding fine no problems in interval.  Asymptomatic for UTI but +nit urine.  12/7/17: Ct Urogram normal.  Cysto normal.  Voided fine today. No findings to explain hematuria.  11/14/17: 72 yo woman was hospitalized recently and a holley was placed for retention.  Hospitalized for viral/acute encephalopathy.  Had hematuria for part of this period, but not prior to holley.  Feeling a lot better.   Voided fine prior to this occasion.  No abd/pelvic pain and no exac/rel factors.  No prior hematuria.  No urolithiasis.  No prior urinary bother. Had a UTI during her catheterization.     Allergies:  Patient has no known allergies.    Medications: has a current medication list which includes the following prescription(s): albuterol, allopurinol, aspirin, baclofen, carvedilol, furosemide, glipizide, hydralazine, levothyroxine, losartan, omeprazole, potassium chloride sa, and tamsulosin.    Review of Systems:  General: No fever, chills, fatigability, or weight loss.  Skin: No rashes, itching, or changes in color or texture of skin.  Chest: Denies DUNN, cyanosis, wheezing, cough, and sputum production.  Abdomen: Appetite fine. No weight loss. Denies diarrhea, abdominal pain, hematemesis, or blood in stool.  Musculoskeletal: No joint stiffness or swelling. Denies back pain.  : As above.  All other review of systems negative.    PMH:   has a past medical history of CHF (congestive heart failure), COPD (chronic obstructive pulmonary disease), CVA (cerebral vascular accident), Diabetes, GERD (gastroesophageal reflux disease), Gout, Hypertension, Thyroid disease, and UTI (urinary tract infection).    PSH:   has a past surgical history that includes Hysterectomy; pr removal gallbladder;  Joint replacement; and Thyroid surgery.    FamHx: family history is not on file.    SocHx:  reports that she has quit smoking. Her smoking use included cigarettes. She quit after 15.00 years of use. She has quit using smokeless tobacco. She reports that she does not drink alcohol or use drugs.     Physical Exam:  Vitals:   Vitals:    09/12/19 1506   BP: 138/68   Pulse: 69     General: A&Ox3. No apparent distress. No deformities.  Neck: No masses. Normal thyroid.  Lungs: normal inspiration. No use of accessory muscles.  Heart: normal pulse. No arrhythmias.  Abdomen: Soft. NT. ND.   Skin: The skin is warm and dry. No jaundice.  Ext: No c/c/e.  :   12/17: external genitalia normal    Labs/Studies:   Bladder Scan performed in office: , then 101 ml after being asked to void agin.    Impression/Plan:   1. Will not treat asymptomatic UTI. Feeling fine and plan seems to be working.  2. Continue flomax.    3. RTC 1 year

## 2020-08-03 NOTE — SUBJECTIVE & OBJECTIVE
Interval History: woke up this am and talking, but still confused    Review of Systems   Constitutional: Negative for chills, diaphoresis, fatigue and fever.   HENT: Negative for congestion, sinus pain and trouble swallowing.    Respiratory: Negative for cough and shortness of breath.    Cardiovascular: Negative for chest pain and leg swelling.   Gastrointestinal: Negative for abdominal distention, abdominal pain, blood in stool, constipation, diarrhea and vomiting.   Genitourinary: Negative for difficulty urinating.   Musculoskeletal: Negative for neck pain and neck stiffness.   Skin: Negative for rash.   Neurological: Negative for tremors, weakness, numbness and headaches.   Psychiatric/Behavioral: Negative for agitation, behavioral problems and confusion.     Objective:     Vital Signs (Most Recent):  Temp: 97.7 °F (36.5 °C) (09/09/17 1139)  Pulse: 78 (09/09/17 1139)  Resp: 18 (09/09/17 1139)  BP: (!) 174/73 (informed MD Alba on call with [facility]) (09/09/17 1139)  SpO2: (!) 92 % (09/09/17 1139) Vital Signs (24h Range):  Temp:  [97.3 °F (36.3 °C)-98.3 °F (36.8 °C)] 97.7 °F (36.5 °C)  Pulse:  [65-78] 78  Resp:  [18-22] 18  SpO2:  [92 %-100 %] 92 %  BP: (138-188)/() 174/73     Weight: (!) 145.3 kg (320 lb 4.8 oz)  Body mass index is 54.98 kg/m².    Intake/Output Summary (Last 24 hours) at 09/09/17 1146  Last data filed at 09/09/17 0500   Gross per 24 hour   Intake             1550 ml   Output             1250 ml   Net              300 ml      Physical Exam   Constitutional: She is oriented to person, place, and time. She appears well-developed and well-nourished.   obese   HENT:   Head: Normocephalic and atraumatic.   Mouth/Throat: Oropharynx is clear and moist.   Eyes: Conjunctivae and EOM are normal. Pupils are equal, round, and reactive to light. No scleral icterus.   Neck: Normal range of motion. Neck supple. No thyromegaly present.   Cardiovascular: Normal rate, regular rhythm and normal heart sounds.     Pulmonary/Chest: Effort normal and breath sounds normal. No respiratory distress. She has no wheezes. She has no rales.   Abdominal: Soft. Bowel sounds are normal. She exhibits no distension and no mass. There is no tenderness. There is no rebound and no guarding. No hernia.   Musculoskeletal: Normal range of motion. She exhibits edema. She exhibits no deformity.   Lymphadenopathy:     She has no cervical adenopathy.   Neurological: She is alert and oriented to person, place, and time. She has normal strength.   Psychiatric: She has a normal mood and affect. Her behavior is normal. Her speech is delayed. Cognition and memory are impaired. She exhibits abnormal recent memory and abnormal remote memory. She is attentive.       Significant Labs:   CBC:     Recent Labs  Lab 09/08/17  0525 09/09/17  0643   WBC 6.48 6.37   HGB 12.3 12.3   HCT 38.1 37.8    151     CMP:     Recent Labs  Lab 09/08/17  0525 09/09/17  0642    140   K 3.2* 3.2*    102   CO2 28 28   * 135*   BUN 16 17   CREATININE 1.2 1.6*   CALCIUM 8.8 8.9   ANIONGAP 10 10   EGFRNONAA 44.9* 31.7*       Significant Imaging: I have reviewed and interpreted all pertinent imaging results/findings within the past 24 hours.   172.72